# Patient Record
Sex: MALE | Race: BLACK OR AFRICAN AMERICAN | NOT HISPANIC OR LATINO | Employment: OTHER | URBAN - METROPOLITAN AREA
[De-identification: names, ages, dates, MRNs, and addresses within clinical notes are randomized per-mention and may not be internally consistent; named-entity substitution may affect disease eponyms.]

---

## 2018-04-18 ENCOUNTER — OFFICE VISIT (OUTPATIENT)
Dept: SURGERY | Facility: CLINIC | Age: 58
End: 2018-04-18
Payer: MEDICARE

## 2018-04-18 VITALS
HEIGHT: 68 IN | DIASTOLIC BLOOD PRESSURE: 83 MMHG | BODY MASS INDEX: 21.98 KG/M2 | SYSTOLIC BLOOD PRESSURE: 123 MMHG | TEMPERATURE: 98 F | WEIGHT: 145 LBS | RESPIRATION RATE: 18 BRPM | HEART RATE: 80 BPM | OXYGEN SATURATION: 96 %

## 2018-04-18 DIAGNOSIS — D62 ACUTE BLOOD LOSS ANEMIA: ICD-10-CM

## 2018-04-18 DIAGNOSIS — Z12.11 ENCOUNTER FOR SCREENING COLONOSCOPY: ICD-10-CM

## 2018-04-18 DIAGNOSIS — Z12.11 ENCOUNTER FOR SCREENING COLONOSCOPY FOR NON-HIGH-RISK PATIENT: Primary | ICD-10-CM

## 2018-04-18 PROCEDURE — 99205 OFFICE O/P NEW HI 60 MIN: CPT | Mod: S$GLB,,, | Performed by: SURGERY

## 2018-04-18 RX ORDER — SODIUM CHLORIDE 9 MG/ML
INJECTION, SOLUTION INTRAVENOUS CONTINUOUS
Status: CANCELLED | OUTPATIENT
Start: 2018-04-18

## 2018-04-18 RX ORDER — AMLODIPINE BESYLATE 5 MG/1
TABLET ORAL
Status: ON HOLD | COMMUNITY
End: 2019-03-26 | Stop reason: HOSPADM

## 2018-04-18 RX ORDER — POLYETHYLENE GLYCOL 3350, SODIUM SULFATE ANHYDROUS, SODIUM BICARBONATE, SODIUM CHLORIDE, POTASSIUM CHLORIDE 236; 22.74; 6.74; 5.86; 2.97 G/4L; G/4L; G/4L; G/4L; G/4L
4 POWDER, FOR SOLUTION ORAL ONCE
Qty: 4000 ML | Refills: 0 | Status: SHIPPED | OUTPATIENT
Start: 2018-04-18 | End: 2018-04-18

## 2018-04-19 NOTE — H&P
[facility] Surgery H&P Note    Subjective:       Patient ID: Pranay Colindres is a 58 y.o. male.    Chief Complaint: Consult (colonoscopy)    HPI:  Pranay Colindres is a 58 y.o. male with a history of hypertension and PVD on asa presents today as a referral from Dr. Torres for the evaluation of anemia.  Patient has never undergone endoscopy.  Patient has no family history of colon or rectal cancers.  No blood in the stool.  No unexplained weight loss.  No changes in bowel habits.  Patient has no history of reflux disease.  Patient is passed due his need for screening endoscopy.  He presents today for evaluation.    Past Medical History:   Diagnosis Date    Allergy     Arthritis     H/O heart surgery     Hypertension      Past Surgical History:   Procedure Laterality Date    AMPUTATION      right foot 5th digit    HIP SURGERY      x2     No family history on file.  Social History     Social History    Marital status: Single     Spouse name: N/A    Number of children: N/A    Years of education: N/A     Social History Main Topics    Smoking status: Current Some Day Smoker     Types: Cigarettes    Smokeless tobacco: Never Used    Alcohol use Yes      Comment: 1-2    Drug use: No    Sexual activity: Not Asked     Other Topics Concern    None     Social History Narrative    None       Current Outpatient Prescriptions   Medication Sig Dispense Refill    amLODIPine (NORVASC) 5 MG tablet amlodipine 5 mg tablet       No current facility-administered medications for this visit.      Review of patient's allergies indicates:  No Known Allergies    Review of Systems   Constitutional: Negative for appetite change, chills and fever.   HENT: Negative for congestion, dental problem and drooling.    Eyes: Negative for photophobia, discharge and itching.   Respiratory: Negative for apnea and chest tightness.    Cardiovascular: Negative for chest pain, palpitations and leg swelling.   Gastrointestinal: Negative for  "abdominal distention and abdominal pain.   Endocrine: Negative for cold intolerance and heat intolerance.   Genitourinary: Negative for difficulty urinating and dysuria.   Musculoskeletal: Negative for arthralgias and back pain.   Skin: Negative for color change and pallor.   Neurological: Negative for dizziness, facial asymmetry and headaches.   Hematological: Negative for adenopathy. Does not bruise/bleed easily.   Psychiatric/Behavioral: Negative for agitation, behavioral problems and confusion.       Objective:      Vitals:    04/18/18 1348   BP: 123/83   BP Location: Left arm   Patient Position: Sitting   Pulse: 80   Resp: 18   Temp: 97.7 °F (36.5 °C)   SpO2: 96%   Weight: 65.8 kg (145 lb)   Height: 5' 8" (1.727 m)     Physical Exam   Constitutional: He is oriented to person, place, and time. He appears well-developed and well-nourished.   HENT:   Head: Normocephalic and atraumatic.   Eyes: EOM are normal. Pupils are equal, round, and reactive to light.   Neck: Normal range of motion. Neck supple. No thyromegaly present.   Cardiovascular: Normal rate and regular rhythm.    No murmur heard.  Pulmonary/Chest: Effort normal and breath sounds normal. No respiratory distress.   Abdominal: Soft. Bowel sounds are normal. He exhibits no distension. There is no tenderness.   Musculoskeletal: Normal range of motion. He exhibits no edema.   Neurological: He is alert and oriented to person, place, and time. No cranial nerve deficit.   Difficulties with walking, walks with a walker due to his previous oriented review his previous surgeries.  Mentally slightly delayed.   Skin: Skin is warm. Capillary refill takes less than 2 seconds. No rash noted. He is not diaphoretic. No erythema.   Psychiatric: He has a normal mood and affect.       Lab Review: I have reviewed the patient's labs from Dr. Torres office.  Labs showed an anemia of 13 and 39.  He was previously 15 and 43.  Platelets normal at 257.  Sodium 142, potassium 4.7, " chloride 103, bicarbonate 27, creatinine 0.87 glucose 101 LFTs within normal limits iron within normal limits at 84.     Assessment:       1. Encounter for screening colonoscopy for non-high-risk patient    2. Acute blood loss anemia    3. Encounter for screening colonoscopy        Plan:   Encounter for screening colonoscopy for non-high-risk patient  -     Case Request Operating Room: COLONOSCOPY, ESOPHAGOGASTRODUODENOSCOPY (EGD)  -     EKG 12-lead; Future  -     Case Request Operating Room: COLONOSCOPY, ESOPHAGOGASTRODUODENOSCOPY (EGD)  -     polyethylene glycol (GOLYTELY,NULYTELY) 236-22.74-6.74 -5.86 gram suspension; Take 4,000 mLs (4 L total) by mouth once.  Dispense: 4000 mL; Refill: 0    Acute blood loss anemia  -     Case Request Operating Room: COLONOSCOPY, ESOPHAGOGASTRODUODENOSCOPY (EGD)  -     Case Request Operating Room: COLONOSCOPY, ESOPHAGOGASTRODUODENOSCOPY (EGD)    Encounter for screening colonoscopy    Other orders  -     Place in Outpatient; Standing  -     Vital signs; Standing  -     Up ad rui; Standing  -     Insert peripheral IV; Standing  -     Diet NPO; Standing  -     0.9%  NaCl infusion; Inject into the vein continuous.  -     IP VTE LOW RISK PATIENT; Standing  -     Place JULIETTE hose; Standing  -     Place sequential compression device; Standing  -     Pulse Oximetry Q4H; Standing  -     Up ad rui; Standing  -     Diet NPO; Standing  -     0.9%  NaCl infusion; Inject into the vein continuous.  -     Insert peripheral IV; Standing        Medical Decision Making/Counseling:    Risk and benefits of EGD were discussed in clinic in depth.  Risk of EGD were discussed to include bleeding as well as perforation.  Patient understands that if the above procedural risks were to occur, he could need further intervention to include but not exclude a blood transfusion, repeat procedure, admission to the hospital, or even surgery which would likely require transfer to a higher level of care.   Risks and  benefits of Colonoscopy were discussed in depth in clinic as well.  From a procedural standpoint, we discuss the benefits of colonoscopy to be finding colon cancers at early stages, including polyps which can be endoscopically resectable, to finding early stage colon cancers which can be better treated with current medical and surgical therapies in order to give patients a longer survival, if found in these early stages.  From a standpoint of risks, the risk of bleeding and perforation of the colon were discussed.  I personally discussed that if complications of bleeding or perforation were to occur, the patient could need as little as a blood transfusion and as much as possible hospital admission, repeat procedure, or even surgery.  During today's discussion of the procedure of colonoscopy with the patient, I personally monae the patient a picture to assist with counseling.  Total counseling time between 40 minutes face-to-face.

## 2018-04-20 ENCOUNTER — HOSPITAL ENCOUNTER (OUTPATIENT)
Dept: CARDIOLOGY | Facility: HOSPITAL | Age: 58
Discharge: HOME | End: 2018-04-20
Attending: SURGERY
Payer: MEDICARE

## 2018-04-20 DIAGNOSIS — Z12.11 ENCOUNTER FOR SCREENING COLONOSCOPY FOR NON-HIGH-RISK PATIENT: ICD-10-CM

## 2018-04-20 PROCEDURE — 93005 ELECTROCARDIOGRAM TRACING: CPT

## 2018-04-23 ENCOUNTER — SURGERY (OUTPATIENT)
Age: 58
End: 2018-04-23

## 2018-04-23 ENCOUNTER — ANESTHESIA (OUTPATIENT)
Dept: SURGERY | Facility: HOSPITAL | Age: 58
End: 2018-04-23
Payer: MEDICARE

## 2018-04-23 ENCOUNTER — ANESTHESIA EVENT (OUTPATIENT)
Dept: SURGERY | Facility: HOSPITAL | Age: 58
End: 2018-04-23
Payer: MEDICARE

## 2018-04-23 ENCOUNTER — HOSPITAL ENCOUNTER (OUTPATIENT)
Facility: HOSPITAL | Age: 58
Discharge: HOME | End: 2018-04-23
Attending: SURGERY | Admitting: SURGERY
Payer: MEDICARE

## 2018-04-23 DIAGNOSIS — Z12.11 ENCOUNTER FOR SCREENING COLONOSCOPY: Primary | ICD-10-CM

## 2018-04-23 PROCEDURE — 25000003 PHARM REV CODE 250: Performed by: SURGERY

## 2018-04-23 PROCEDURE — 25000003 PHARM REV CODE 250: Performed by: NURSE ANESTHETIST, CERTIFIED REGISTERED

## 2018-04-23 PROCEDURE — 71000033 HC RECOVERY, INTIAL HOUR: Performed by: SURGERY

## 2018-04-23 PROCEDURE — D9220A PRA ANESTHESIA: Mod: ANES,,, | Performed by: ANESTHESIOLOGY

## 2018-04-23 PROCEDURE — 45384 COLONOSCOPY W/LESION REMOVAL: CPT | Performed by: SURGERY

## 2018-04-23 PROCEDURE — D9220A PRA ANESTHESIA: Mod: CRNA,,, | Performed by: NURSE ANESTHETIST, CERTIFIED REGISTERED

## 2018-04-23 PROCEDURE — 43250 EGD CAUTERY TUMOR POLYP: CPT

## 2018-04-23 PROCEDURE — 45384 COLONOSCOPY W/LESION REMOVAL: CPT | Mod: PT,,, | Performed by: SURGERY

## 2018-04-23 PROCEDURE — 37000008 HC ANESTHESIA 1ST 15 MINUTES: Performed by: SURGERY

## 2018-04-23 PROCEDURE — 88305 TISSUE EXAM BY PATHOLOGIST: CPT | Mod: 26,,, | Performed by: PATHOLOGY

## 2018-04-23 PROCEDURE — 88342 IMHCHEM/IMCYTCHM 1ST ANTB: CPT | Mod: 26,,, | Performed by: PATHOLOGY

## 2018-04-23 PROCEDURE — 43239 EGD BIOPSY SINGLE/MULTIPLE: CPT | Performed by: SURGERY

## 2018-04-23 PROCEDURE — 63600175 PHARM REV CODE 636 W HCPCS: Performed by: NURSE ANESTHETIST, CERTIFIED REGISTERED

## 2018-04-23 PROCEDURE — 43250 EGD CAUTERY TUMOR POLYP: CPT | Mod: 51,,, | Performed by: SURGERY

## 2018-04-23 PROCEDURE — 37000009 HC ANESTHESIA EA ADD 15 MINS: Performed by: SURGERY

## 2018-04-23 PROCEDURE — 43239 EGD BIOPSY SINGLE/MULTIPLE: CPT | Mod: 59,,, | Performed by: SURGERY

## 2018-04-23 PROCEDURE — 88305 TISSUE EXAM BY PATHOLOGIST: CPT | Performed by: PATHOLOGY

## 2018-04-23 PROCEDURE — 27201012 HC FORCEPS, HOT/COLD, DISP: Performed by: SURGERY

## 2018-04-23 RX ORDER — MORPHINE SULFATE 4 MG/ML
2 INJECTION, SOLUTION INTRAMUSCULAR; INTRAVENOUS EVERY 5 MIN PRN
Status: CANCELLED | OUTPATIENT
Start: 2018-04-23

## 2018-04-23 RX ORDER — PANTOPRAZOLE SODIUM 40 MG/1
40 TABLET, DELAYED RELEASE ORAL DAILY
Qty: 30 TABLET | Refills: 6 | Status: SHIPPED | OUTPATIENT
Start: 2018-04-23 | End: 2018-11-27 | Stop reason: SDUPTHER

## 2018-04-23 RX ORDER — MIDAZOLAM HYDROCHLORIDE 1 MG/ML
INJECTION, SOLUTION INTRAMUSCULAR; INTRAVENOUS
Status: DISCONTINUED | OUTPATIENT
Start: 2018-04-23 | End: 2018-04-23

## 2018-04-23 RX ORDER — FAMOTIDINE 10 MG/ML
20 INJECTION INTRAVENOUS ONCE
Status: CANCELLED | OUTPATIENT
Start: 2018-04-23 | End: 2018-04-23

## 2018-04-23 RX ORDER — SUCRALFATE 1 G/1
1 TABLET ORAL 4 TIMES DAILY
Qty: 120 TABLET | Refills: 0 | Status: SHIPPED | OUTPATIENT
Start: 2018-04-23 | End: 2019-03-20

## 2018-04-23 RX ORDER — GLYCOPYRROLATE 0.2 MG/ML
INJECTION INTRAMUSCULAR; INTRAVENOUS
Status: DISCONTINUED | OUTPATIENT
Start: 2018-04-23 | End: 2018-04-23

## 2018-04-23 RX ORDER — PROPOFOL 10 MG/ML
VIAL (ML) INTRAVENOUS
Status: DISCONTINUED | OUTPATIENT
Start: 2018-04-23 | End: 2018-04-23

## 2018-04-23 RX ORDER — SODIUM CHLORIDE 9 MG/ML
INJECTION, SOLUTION INTRAVENOUS CONTINUOUS
Status: CANCELLED | OUTPATIENT
Start: 2018-04-23

## 2018-04-23 RX ORDER — ONDANSETRON 2 MG/ML
4 INJECTION INTRAMUSCULAR; INTRAVENOUS DAILY PRN
Status: CANCELLED | OUTPATIENT
Start: 2018-04-23

## 2018-04-23 RX ORDER — DIPHENHYDRAMINE HYDROCHLORIDE 50 MG/ML
12.5 INJECTION INTRAMUSCULAR; INTRAVENOUS
Status: CANCELLED | OUTPATIENT
Start: 2018-04-23

## 2018-04-23 RX ORDER — SODIUM CHLORIDE 9 MG/ML
INJECTION, SOLUTION INTRAVENOUS CONTINUOUS
Status: DISCONTINUED | OUTPATIENT
Start: 2018-04-23 | End: 2018-04-23 | Stop reason: HOSPADM

## 2018-04-23 RX ADMIN — PROPOFOL 30 MG: 10 INJECTION, EMULSION INTRAVENOUS at 09:04

## 2018-04-23 RX ADMIN — PROPOFOL 20 MG: 10 INJECTION, EMULSION INTRAVENOUS at 09:04

## 2018-04-23 RX ADMIN — GLYCOPYRROLATE 0.2 MG: 0.2 INJECTION INTRAMUSCULAR; INTRAVENOUS at 09:04

## 2018-04-23 RX ADMIN — PROPOFOL 30 MG: 10 INJECTION, EMULSION INTRAVENOUS at 10:04

## 2018-04-23 RX ADMIN — SODIUM CHLORIDE: 0.9 INJECTION, SOLUTION INTRAVENOUS at 09:04

## 2018-04-23 RX ADMIN — PROPOFOL 20 MG: 10 INJECTION, EMULSION INTRAVENOUS at 10:04

## 2018-04-23 RX ADMIN — MIDAZOLAM HYDROCHLORIDE 2 MG: 1 INJECTION, SOLUTION INTRAMUSCULAR; INTRAVENOUS at 09:04

## 2018-04-23 RX ADMIN — PROPOFOL 50 MG: 10 INJECTION, EMULSION INTRAVENOUS at 09:04

## 2018-04-23 RX ADMIN — PROPOFOL 40 MG: 10 INJECTION, EMULSION INTRAVENOUS at 09:04

## 2018-04-23 NOTE — PROVATION PATIENT INSTRUCTIONS
Discharge Summary/Instructions after an Endoscopic Procedure  Patient Name: Pranay Colindres  Patient MRN: [id number]  Patient YOB: 1960 Monday, April 23, 2018  Jeramy Castelan MD  RESTRICTIONS:  During your procedure today, you received medications for sedation.  These   medications may affect your judgment, balance and coordination.  Therefore,   for 24 hours, you have the following restrictions:   - DO NOT drive a car, operate machinery, make legal/financial decisions,   sign important papers or drink alcohol.    ACTIVITY:  The following day: return to full activity including work, except no heavy   lifting, straining or running for 3 days if polyps were removed.  DIET:  Eat and drink normally unless instructed otherwise.     TREATMENT FOR COMMON SIDE EFFECTS:  - Mild abdominal pain, nausea, belching, bloating or excessive gas:  rest,   eat lightly and use a heating pad.  - Sore Throat: treat with throat lozenges and/or gargle with warm salt   water.  - Because air was used during the procedure, expelling large amounts of air   from your rectum or belching is normal.  - If a bowel prep was taken, you may not have a bowel movement for 1-3 days.    This is normal.  SYMPTOMS TO WATCH FOR AND REPORT TO YOUR PHYSICIAN:  1. Abdominal pain or bloating, other than gas cramps.  2. Chest pain.  3. Back pain.  4. Signs of infection such as: chills or fever occurring within 24 hours   after the procedure.  5. Rectal bleeding, which would show as bright red, maroon, or black stools.   (A tablespoon of blood from the rectum is not serious, especially if   hemorrhoids are present.)  6. Vomiting.  7. Weakness or dizziness.  GO DIRECTLY TO THE NEAREST EMERGENCY ROOM IF YOU HAVE ANY OF THE FOLLOWING:      Difficulty breathing              Chills and/or fever over 101 F   Persistent vomiting and/or vomiting blood   Severe abdominal pain   Severe chest pain   Black, tarry stools   Bleeding- more than one  tablespoon   Any other symptom or condition that you feel may need urgent attention  Your doctor recommends these additional instructions:  If any biopsies were taken, your doctors clinic will contact you in 1 to 2   weeks with any results.  - Await pathology results.   - Repeat upper endoscopy in 3 years for surveillance.   - Return to my office in 2 weeks.   - Discharge patient to home (ambulatory).   - Resume regular diet daily.  For questions, problems or results please call your physician - Jeramy Castelan MD at Work:  [phone].  [facility] EMERGENCY ROOM PHONE NUMBER: [phone]  IF A COMPLICATION OR EMERGENCY SITUATION ARISES AND YOU ARE UNABLE TO REACH   YOUR PHYSICIAN - GO DIRECTLY TO THE EMERGENCY ROOM.  MD Jeramy Chaidez MD  4/23/2018 10:17:24 AM  This report has been verified and signed electronically.

## 2018-04-23 NOTE — INTERVAL H&P NOTE
The patient has been examined and the H&P has been reviewed:    I concur with the findings and no changes have occurred since H&P was written.    Surgery risks, benefits and alternative options discussed and understood by patient/family.          There are no hospital problems to display for this patient.

## 2018-04-23 NOTE — H&P (VIEW-ONLY)
[facility] Surgery H&P Note    Subjective:       Patient ID: Pranay Colindres is a 58 y.o. male.    Chief Complaint: Consult (colonoscopy)    HPI:  Pranay Colindres is a 58 y.o. male with a history of hypertension and PVD on asa presents today as a referral from Dr. Torres for the evaluation of anemia.  Patient has never undergone endoscopy.  Patient has no family history of colon or rectal cancers.  No blood in the stool.  No unexplained weight loss.  No changes in bowel habits.  Patient has no history of reflux disease.  Patient is passed due his need for screening endoscopy.  He presents today for evaluation.    Past Medical History:   Diagnosis Date    Allergy     Arthritis     H/O heart surgery     Hypertension      Past Surgical History:   Procedure Laterality Date    AMPUTATION      right foot 5th digit    HIP SURGERY      x2     No family history on file.  Social History     Social History    Marital status: Single     Spouse name: N/A    Number of children: N/A    Years of education: N/A     Social History Main Topics    Smoking status: Current Some Day Smoker     Types: Cigarettes    Smokeless tobacco: Never Used    Alcohol use Yes      Comment: 1-2    Drug use: No    Sexual activity: Not Asked     Other Topics Concern    None     Social History Narrative    None       Current Outpatient Prescriptions   Medication Sig Dispense Refill    amLODIPine (NORVASC) 5 MG tablet amlodipine 5 mg tablet       No current facility-administered medications for this visit.      Review of patient's allergies indicates:  No Known Allergies    Review of Systems   Constitutional: Negative for appetite change, chills and fever.   HENT: Negative for congestion, dental problem and drooling.    Eyes: Negative for photophobia, discharge and itching.   Respiratory: Negative for apnea and chest tightness.    Cardiovascular: Negative for chest pain, palpitations and leg swelling.   Gastrointestinal: Negative for  "abdominal distention and abdominal pain.   Endocrine: Negative for cold intolerance and heat intolerance.   Genitourinary: Negative for difficulty urinating and dysuria.   Musculoskeletal: Negative for arthralgias and back pain.   Skin: Negative for color change and pallor.   Neurological: Negative for dizziness, facial asymmetry and headaches.   Hematological: Negative for adenopathy. Does not bruise/bleed easily.   Psychiatric/Behavioral: Negative for agitation, behavioral problems and confusion.       Objective:      Vitals:    04/18/18 1348   BP: 123/83   BP Location: Left arm   Patient Position: Sitting   Pulse: 80   Resp: 18   Temp: 97.7 °F (36.5 °C)   SpO2: 96%   Weight: 65.8 kg (145 lb)   Height: 5' 8" (1.727 m)     Physical Exam   Constitutional: He is oriented to person, place, and time. He appears well-developed and well-nourished.   HENT:   Head: Normocephalic and atraumatic.   Eyes: EOM are normal. Pupils are equal, round, and reactive to light.   Neck: Normal range of motion. Neck supple. No thyromegaly present.   Cardiovascular: Normal rate and regular rhythm.    No murmur heard.  Pulmonary/Chest: Effort normal and breath sounds normal. No respiratory distress.   Abdominal: Soft. Bowel sounds are normal. He exhibits no distension. There is no tenderness.   Musculoskeletal: Normal range of motion. He exhibits no edema.   Neurological: He is alert and oriented to person, place, and time. No cranial nerve deficit.   Difficulties with walking, walks with a walker due to his previous oriented review his previous surgeries.  Mentally slightly delayed.   Skin: Skin is warm. Capillary refill takes less than 2 seconds. No rash noted. He is not diaphoretic. No erythema.   Psychiatric: He has a normal mood and affect.       Lab Review: I have reviewed the patient's labs from Dr. Torres office.  Labs showed an anemia of 13 and 39.  He was previously 15 and 43.  Platelets normal at 257.  Sodium 142, potassium 4.7, " chloride 103, bicarbonate 27, creatinine 0.87 glucose 101 LFTs within normal limits iron within normal limits at 84.     Assessment:       1. Encounter for screening colonoscopy for non-high-risk patient    2. Acute blood loss anemia    3. Encounter for screening colonoscopy        Plan:   Encounter for screening colonoscopy for non-high-risk patient  -     Case Request Operating Room: COLONOSCOPY, ESOPHAGOGASTRODUODENOSCOPY (EGD)  -     EKG 12-lead; Future  -     Case Request Operating Room: COLONOSCOPY, ESOPHAGOGASTRODUODENOSCOPY (EGD)  -     polyethylene glycol (GOLYTELY,NULYTELY) 236-22.74-6.74 -5.86 gram suspension; Take 4,000 mLs (4 L total) by mouth once.  Dispense: 4000 mL; Refill: 0    Acute blood loss anemia  -     Case Request Operating Room: COLONOSCOPY, ESOPHAGOGASTRODUODENOSCOPY (EGD)  -     Case Request Operating Room: COLONOSCOPY, ESOPHAGOGASTRODUODENOSCOPY (EGD)    Encounter for screening colonoscopy    Other orders  -     Place in Outpatient; Standing  -     Vital signs; Standing  -     Up ad rui; Standing  -     Insert peripheral IV; Standing  -     Diet NPO; Standing  -     0.9%  NaCl infusion; Inject into the vein continuous.  -     IP VTE LOW RISK PATIENT; Standing  -     Place JULIETTE hose; Standing  -     Place sequential compression device; Standing  -     Pulse Oximetry Q4H; Standing  -     Up ad rui; Standing  -     Diet NPO; Standing  -     0.9%  NaCl infusion; Inject into the vein continuous.  -     Insert peripheral IV; Standing        Medical Decision Making/Counseling:    Risk and benefits of EGD were discussed in clinic in depth.  Risk of EGD were discussed to include bleeding as well as perforation.  Patient understands that if the above procedural risks were to occur, he could need further intervention to include but not exclude a blood transfusion, repeat procedure, admission to the hospital, or even surgery which would likely require transfer to a higher level of care.   Risks and  benefits of Colonoscopy were discussed in depth in clinic as well.  From a procedural standpoint, we discuss the benefits of colonoscopy to be finding colon cancers at early stages, including polyps which can be endoscopically resectable, to finding early stage colon cancers which can be better treated with current medical and surgical therapies in order to give patients a longer survival, if found in these early stages.  From a standpoint of risks, the risk of bleeding and perforation of the colon were discussed.  I personally discussed that if complications of bleeding or perforation were to occur, the patient could need as little as a blood transfusion and as much as possible hospital admission, repeat procedure, or even surgery.  During today's discussion of the procedure of colonoscopy with the patient, I personally monae the patient a picture to assist with counseling.  Total counseling time between 40 minutes face-to-face.

## 2018-04-23 NOTE — PROVATION PATIENT INSTRUCTIONS
Discharge Summary/Instructions after an Endoscopic Procedure  Patient Name: Pranay Colindres  Patient MRN: [id number]  Patient YOB: 1960 Monday, April 23, 2018  Jeramy Castelan MD  RESTRICTIONS:  During your procedure today, you received medications for sedation.  These   medications may affect your judgment, balance and coordination.  Therefore,   for 24 hours, you have the following restrictions:   - DO NOT drive a car, operate machinery, make legal/financial decisions,   sign important papers or drink alcohol.    ACTIVITY:  The following day: return to full activity including work, except no heavy   lifting, straining or running for 3 days if polyps were removed.  DIET:  Eat and drink normally unless instructed otherwise.     TREATMENT FOR COMMON SIDE EFFECTS:  - Mild abdominal pain, nausea, belching, bloating or excessive gas:  rest,   eat lightly and use a heating pad.  - Sore Throat: treat with throat lozenges and/or gargle with warm salt   water.  - Because air was used during the procedure, expelling large amounts of air   from your rectum or belching is normal.  - If a bowel prep was taken, you may not have a bowel movement for 1-3 days.    This is normal.  SYMPTOMS TO WATCH FOR AND REPORT TO YOUR PHYSICIAN:  1. Abdominal pain or bloating, other than gas cramps.  2. Chest pain.  3. Back pain.  4. Signs of infection such as: chills or fever occurring within 24 hours   after the procedure.  5. Rectal bleeding, which would show as bright red, maroon, or black stools.   (A tablespoon of blood from the rectum is not serious, especially if   hemorrhoids are present.)  6. Vomiting.  7. Weakness or dizziness.  GO DIRECTLY TO THE NEAREST EMERGENCY ROOM IF YOU HAVE ANY OF THE FOLLOWING:      Difficulty breathing              Chills and/or fever over 101 F   Persistent vomiting and/or vomiting blood   Severe abdominal pain   Severe chest pain   Black, tarry stools   Bleeding- more than one  tablespoon   Any other symptom or condition that you feel may need urgent attention  Your doctor recommends these additional instructions:  If any biopsies were taken, your doctors clinic will contact you in 1 to 2   weeks with any results.  - Repeat colonoscopy in 5 years for surveillance.   - Discharge patient to home (ambulatory).   - Resume regular diet daily.   - Await pathology results.   - Return to my office in 2 weeks.  For questions, problems or results please call your physician - Jeramy Castelan MD at Work:  [phone].  [facility] EMERGENCY ROOM PHONE NUMBER: [phone]  IF A COMPLICATION OR EMERGENCY SITUATION ARISES AND YOU ARE UNABLE TO REACH   YOUR PHYSICIAN - GO DIRECTLY TO THE EMERGENCY ROOM.  MD Jeramy Chaidez MD  4/23/2018 10:21:22 AM  This report has been verified and signed electronically.

## 2018-04-23 NOTE — ANESTHESIA POSTPROCEDURE EVALUATION
Anesthesia Post Evaluation    Patient: Pranay Colindres    Procedure(s) Performed: Procedure(s) (LRB):  COLONOSCOPY (N/A)  ESOPHAGOGASTRODUODENOSCOPY (EGD) (N/A)    Final Anesthesia Type: MAC  Patient location during evaluation: PACU  Patient participation: Yes- Able to Participate  Level of consciousness: awake and alert  Pain management: adequate  Airway patency: patent  PONV status at discharge: No PONV  Anesthetic complications: no      Cardiovascular status: blood pressure returned to baseline  Respiratory status: unassisted  Hydration status: euvolemic  Follow-up not needed.        Visit Vitals  BP (!) 138/96   Pulse 65   Temp 36.7 °C (98 °F) (Oral)   Resp 17   SpO2 96%       Pain/Yue Score: Pain Assessment Performed: Yes (4/23/2018  7:50 AM)  Presence of Pain: non-verbal indicators absent (4/23/2018 10:16 AM)  Yue Score: 9 (4/23/2018 10:30 AM)  Modified Yue Score: 19 (4/23/2018 10:45 AM)

## 2018-04-23 NOTE — PLAN OF CARE
Leaving floor per w/c with RN and brother. Awake and alert. Resp even and unlabored room air. No distress noted. Le PO fluids. Denies c/o pain or nausea. Reports voiding without difficulty. All belongings returned to pt. rm

## 2018-04-23 NOTE — TRANSFER OF CARE
Anesthesia Transfer of Care Note    Patient: Pranay Colindres    Procedure(s) Performed: Procedure(s) (LRB):  COLONOSCOPY (N/A)  ESOPHAGOGASTRODUODENOSCOPY (EGD) (N/A)    Patient location: PACU    Anesthesia Type: MAC    Transport from OR: Transported from OR on room air with adequate spontaneous ventilation    Post pain: adequate analgesia    Post assessment: no apparent anesthetic complications and tolerated procedure well    Post vital signs: stable    Level of consciousness: awake, alert and oriented    Nausea/Vomiting: no nausea/vomiting    Complications: none    Transfer of care protocol was followedComments: Remained with pt till RN available to receive report      Last vitals: There were no vitals taken for this visit.

## 2018-04-23 NOTE — ANESTHESIA PREPROCEDURE EVALUATION
04/23/2018  Pranay Colindres is a 58 y.o., male.    Anesthesia Evaluation    I have reviewed the Patient Summary Reports.    I have reviewed the Nursing Notes.   I have reviewed the Medications.     Review of Systems  Anesthesia Hx:  No problems with previous Anesthesia    Social:  Smoker    Hematology/Oncology:         -- Anemia:   Cardiovascular:  Peripheral Arterial Disease  Hypertension    Musculoskeletal:  Musculoskeletal General/Symptoms: Functional capacity is ambulatory with walker.        Physical Exam  General:  Well nourished    Airway/Jaw/Neck:  Airway Findings: Mouth Opening: Normal Tongue: Normal  General Airway Assessment: Adult  Mallampati: II  TM Distance: Normal, at least 6 cm  Jaw/Neck Findings:  Neck ROM: Normal ROM       Chest/Lungs:  Chest/Lungs Findings: Clear to auscultation     Heart/Vascular:  Heart Findings: Rate: Normal  Rhythm: Regular Rhythm        Mental Status:  Mental Status Findings:  Cooperative, Alert and Oriented         Anesthesia Plan  Type of Anesthesia, risks & benefits discussed:  Anesthesia Type:  MAC  Patient's Preference:   Intra-op Monitoring Plan: standard ASA monitors  Intra-op Monitoring Plan Comments:   Post Op Pain Control Plan: IV/PO Opioids PRN  Post Op Pain Control Plan Comments:   Induction:   IV  Beta Blocker:  Patient is not currently on a Beta-Blocker (No further documentation required).       Informed Consent: Patient understands risks and agrees with Anesthesia plan.  Questions answered. Anesthesia consent signed with patient.  ASA Score: 3     Day of Surgery Review of History & Physical: I have interviewed and examined the patient. I have reviewed the patient's H&P dated:            Ready For Surgery From Anesthesia Perspective.

## 2018-04-23 NOTE — DISCHARGE SUMMARY
Discharge Note        SUMMARY     Admit Date: 4/23/2018    Attending Physician: Jeramy Castelan MD     Discharge Physician: Jeramy Castelan MD    Discharge Date: 4/23/2018 10:23 AM      Hospital Course: Patient tolerated procedure well.     Disposition: Home or Self Care    Patient Instructions:   Current Discharge Medication List      START taking these medications    Details   pantoprazole (PROTONIX) 40 MG tablet Take 1 tablet (40 mg total) by mouth once daily.  Qty: 30 tablet, Refills: 6      sucralfate (CARAFATE) 1 gram tablet Take 1 tablet (1 g total) by mouth 4 (four) times daily.  Qty: 120 tablet, Refills: 0         CONTINUE these medications which have NOT CHANGED    Details   amLODIPine (NORVASC) 5 MG tablet amlodipine 5 mg tablet             Discharge Procedure Orders (must include Diet, Follow-up, Activity):    Discharge Procedure Orders (must include Diet, Follow-up, Activity)  Diet general     Activity as tolerated     Call MD for:  temperature >100.4     Call MD for:  persistent nausea and vomiting     Call MD for:  severe uncontrolled pain     Call MD for:  difficulty breathing, headache or visual disturbances     Call MD for:  redness, tenderness, or signs of infection (pain, swelling, redness, odor or green/yellow discharge around incision site)     Call MD for:  persistent dizziness or light-headedness          Follow Up:  Follow up as scheduled.  Resume routine diet.  Activity as tolerated.    No driving day of procedure.

## 2018-04-27 VITALS
OXYGEN SATURATION: 96 % | TEMPERATURE: 98 F | RESPIRATION RATE: 17 BRPM | DIASTOLIC BLOOD PRESSURE: 96 MMHG | HEART RATE: 65 BPM | SYSTOLIC BLOOD PRESSURE: 138 MMHG

## 2018-05-11 ENCOUNTER — OFFICE VISIT (OUTPATIENT)
Dept: SURGERY | Facility: CLINIC | Age: 58
End: 2018-05-11
Payer: MEDICARE

## 2018-05-11 VITALS
OXYGEN SATURATION: 96 % | SYSTOLIC BLOOD PRESSURE: 114 MMHG | HEART RATE: 82 BPM | RESPIRATION RATE: 18 BRPM | BODY MASS INDEX: 21.98 KG/M2 | DIASTOLIC BLOOD PRESSURE: 79 MMHG | HEIGHT: 68 IN | TEMPERATURE: 97 F | WEIGHT: 145 LBS

## 2018-05-11 DIAGNOSIS — K29.30 CHRONIC SUPERFICIAL GASTRITIS WITHOUT BLEEDING: Primary | ICD-10-CM

## 2018-05-11 DIAGNOSIS — K63.5 HYPERPLASTIC COLONIC POLYP, UNSPECIFIED PART OF COLON: ICD-10-CM

## 2018-05-11 PROCEDURE — 99212 OFFICE O/P EST SF 10 MIN: CPT | Mod: S$GLB,,, | Performed by: SURGERY

## 2018-05-11 RX ORDER — FOLIC ACID 1 MG/1
1 TABLET ORAL 2 TIMES DAILY
COMMUNITY
End: 2019-03-20

## 2018-05-11 RX ORDER — METOPROLOL SUCCINATE 50 MG/1
50 TABLET, EXTENDED RELEASE ORAL DAILY
COMMUNITY

## 2018-05-13 NOTE — PROGRESS NOTES
"[facility] Surgery  Follow-up    Subjective:       Patient ID: Pranay Colindres is a 58 y.o. male.    Chief Complaint: Follow-up (EGD/Colonoscopy 4-23-18)      HPI:  Pranay Colindres is a 58 y.o. male who presents today for follow-up of EGD and colonoscopy.  Patient on EGD was found to have active duodenitis with acute and chronic inflammation.  Within the stomach the patient was found to have mild chronic gastritis.  Both specimens were evaluated for Helicobacter pylori species which was negative. Patient's cardiac polyp was negative.  Patient's colonoscopy showed 1 rectal polyp which was a hyperplastic polyp.  No issues since the scopes.    Review of Systems   Constitutional: Negative for appetite change, chills and fever.   HENT: Negative for congestion, dental problem and drooling.    Eyes: Negative for photophobia, discharge and itching.   Respiratory: Negative for apnea and chest tightness.    Cardiovascular: Negative for chest pain, palpitations and leg swelling.   Gastrointestinal: Negative for abdominal distention and abdominal pain.   Endocrine: Negative for cold intolerance and heat intolerance.   Genitourinary: Negative for difficulty urinating and dysuria.   Musculoskeletal: Negative for arthralgias and back pain.   Skin: Negative for color change and pallor.   Neurological: Negative for dizziness, facial asymmetry and headaches.   Hematological: Negative for adenopathy. Does not bruise/bleed easily.   Psychiatric/Behavioral: Negative for agitation, behavioral problems and confusion.       Objective:      Vitals:    05/11/18 1008   BP: 114/79   BP Location: Left arm   Patient Position: Sitting   Pulse: 82   Resp: 18   Temp: 97 °F (36.1 °C)   TempSrc: Oral   SpO2: 96%   Weight: 65.8 kg (145 lb)   Height: 5' 8" (1.727 m)     Physical Exam   Constitutional: He is oriented to person, place, and time. He appears well-developed and well-nourished.   HENT:   Head: Normocephalic and atraumatic.   Eyes: " EOM are normal. Pupils are equal, round, and reactive to light.   Neck: Normal range of motion. Neck supple. No thyromegaly present.   Cardiovascular: Normal rate and regular rhythm.    No murmur heard.  Pulmonary/Chest: Effort normal and breath sounds normal. No respiratory distress.   Abdominal: Soft. Bowel sounds are normal. He exhibits no distension. There is no tenderness.   Musculoskeletal: Normal range of motion. He exhibits no edema.   Neurological: He is alert and oriented to person, place, and time. No cranial nerve deficit.   Skin: Skin is warm. Capillary refill takes less than 2 seconds. No rash noted. He is not diaphoretic. No erythema.   Psychiatric: He has a normal mood and affect.     Diagnostics Review: Pathology reviewed with the patient today.  Active duodenitis, chronic gastritis mild, hyperplastic rectal polyp.     Assessment:       1. Chronic superficial gastritis without bleeding    2. Hyperplastic colonic polyp, unspecified part of colon        Plan:   Chronic superficial gastritis without bleeding    Hyperplastic colonic polyp, unspecified part of colon        Medical Decision Making/Counseling:  Recommendations at this time are for repeat colonoscopy in 5 years.  Recommendations for repeat EGD are pending patient's symptomatology.  Currently I recommend continued Carafate therapy for 1 month after the scope. I recommend continue PPI therapy for at least 6 months.  Patient may follow up in 5 years for repeat colonoscopy.

## 2018-12-03 RX ORDER — PANTOPRAZOLE SODIUM 40 MG/1
TABLET, DELAYED RELEASE ORAL
Qty: 30 TABLET | Refills: 6 | Status: SHIPPED | OUTPATIENT
Start: 2018-12-03 | End: 2019-03-20

## 2019-03-06 ENCOUNTER — OFFICE VISIT (OUTPATIENT)
Dept: PODIATRY | Facility: CLINIC | Age: 59
End: 2019-03-06
Payer: MEDICARE

## 2019-03-06 ENCOUNTER — HOSPITAL ENCOUNTER (OUTPATIENT)
Dept: RADIOLOGY | Facility: HOSPITAL | Age: 59
Discharge: HOME | End: 2019-03-06
Attending: PODIATRIST
Payer: MEDICARE

## 2019-03-06 VITALS
TEMPERATURE: 98 F | HEART RATE: 78 BPM | WEIGHT: 145 LBS | SYSTOLIC BLOOD PRESSURE: 156 MMHG | HEIGHT: 68 IN | DIASTOLIC BLOOD PRESSURE: 98 MMHG | BODY MASS INDEX: 21.98 KG/M2

## 2019-03-06 DIAGNOSIS — M79.671 FOOT PAIN, RIGHT: ICD-10-CM

## 2019-03-06 DIAGNOSIS — M79.2 NEURITIS: ICD-10-CM

## 2019-03-06 DIAGNOSIS — M79.671 FOOT PAIN, RIGHT: Primary | ICD-10-CM

## 2019-03-06 PROCEDURE — 73630 X-RAY EXAM OF FOOT: CPT | Mod: 26,RT,, | Performed by: RADIOLOGY

## 2019-03-06 PROCEDURE — 3008F PR BODY MASS INDEX (BMI) DOCUMENTED: ICD-10-PCS | Mod: CPTII,S$GLB,, | Performed by: PODIATRIST

## 2019-03-06 PROCEDURE — 73630 X-RAY EXAM OF FOOT: CPT | Mod: TC,FY,RT

## 2019-03-06 PROCEDURE — 99214 OFFICE O/P EST MOD 30 MIN: CPT | Mod: S$GLB,,, | Performed by: PODIATRIST

## 2019-03-06 PROCEDURE — 99999 PR PBB SHADOW E&M-EST. PATIENT-LVL III: ICD-10-PCS | Mod: PBBFAC,,, | Performed by: PODIATRIST

## 2019-03-06 PROCEDURE — 99214 PR OFFICE/OUTPT VISIT, EST, LEVL IV, 30-39 MIN: ICD-10-PCS | Mod: S$GLB,,, | Performed by: PODIATRIST

## 2019-03-06 PROCEDURE — 99999 PR PBB SHADOW E&M-EST. PATIENT-LVL III: CPT | Mod: PBBFAC,,, | Performed by: PODIATRIST

## 2019-03-06 PROCEDURE — 3008F BODY MASS INDEX DOCD: CPT | Mod: CPTII,S$GLB,, | Performed by: PODIATRIST

## 2019-03-06 PROCEDURE — 73630 XR FOOT COMPLETE 3 VIEW RIGHT: ICD-10-PCS | Mod: 26,RT,, | Performed by: RADIOLOGY

## 2019-03-06 RX ORDER — METOPROLOL TARTRATE 50 MG/1
TABLET ORAL
Status: ON HOLD | COMMUNITY
Start: 2019-02-26 | End: 2019-03-26 | Stop reason: HOSPADM

## 2019-03-06 RX ORDER — LATANOPROST 50 UG/ML
SOLUTION/ DROPS OPHTHALMIC
COMMUNITY
Start: 2019-02-26

## 2019-03-06 RX ORDER — GABAPENTIN 300 MG/1
300 CAPSULE ORAL NIGHTLY
Qty: 30 CAPSULE | Refills: 6 | Status: SHIPPED | OUTPATIENT
Start: 2019-03-06 | End: 2019-04-07

## 2019-03-10 NOTE — PROGRESS NOTES
Subjective:       Patient ID: Pranay Colindres is a 59 y.o. male.    Chief Complaint: Nail Problem; Foot Problem; Foot Pain; and Heel Pain   Patient presents today with a complaint of pain in his arch on the top of his right foot he states he has been experiencing significantly increased discomfort at night when he experiences a burning type sensation.  Patient also complains of ingrown toenails on both big toes.  HPI  Review of Systems   Musculoskeletal: Positive for arthralgias, gait problem and joint swelling.   Neurological: Positive for weakness and numbness.   All other systems reviewed and are negative.      Objective:      Physical Exam   Constitutional: He appears well-developed and well-nourished.   Cardiovascular:   Pulses:       Dorsalis pedis pulses are 1+ on the right side, and 1+ on the left side.        Posterior tibial pulses are 1+ on the right side, and 1+ on the left side.   Pulmonary/Chest: Effort normal.   Musculoskeletal: He exhibits edema, tenderness and deformity.        Right foot: There is decreased range of motion and deformity.        Left foot: There is decreased range of motion and deformity.   Feet:   Right Foot:   Protective Sensation: 4 sites tested. 2 sites sensed.   Skin Integrity: Positive for erythema, callus and dry skin.   Left Foot:   Protective Sensation: 4 sites tested. 2 sites sensed.   Skin Integrity: Positive for erythema, callus and dry skin.   Neurological: He displays abnormal reflex.   Skin: Skin is warm. Capillary refill takes more than 3 seconds.   Psychiatric: He has a normal mood and affect. His behavior is normal. Judgment and thought content normal.   Nursing note and vitals reviewed.    X-Ray Foot Complete Right   Order: [id number]   Status:  Final result   Visible to patient:  No (Not Released) Next appt:  None Dx:  Foot pain, right   Details     Reading Physician Reading Date Result Priority   Rachid Smith MD 3/6/2019       Narrative      EXAMINATION:  XR FOOT COMPLETE 3 VIEW RIGHT    CLINICAL HISTORY:  . Pain in right foot    TECHNIQUE:  AP, lateral, and oblique views of the right foot were performed.    COMPARISON:  Plain films right foot 01/25/2017.    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    Mild hallux valgus deformity with mild degenerative osteoarthrosis of the 1st MTP joint.  Remaining joint spaces are preserved.    Tarsal bones are intact.  Normal tarsometatarsal alignment.  Os peroneum present.    Mild bone demineralization.      Impression       1. 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  2. Mild hallux valgus deformity with mild degenerative osteoarthrosis.  3. Mild bone demineralization.      Electronically signed by: Rachid Smith  Date: 03/06/2019  Time: 10:43             Assessment:       1. Foot pain, right    2. Neuritis        Plan:       Following evaluation patient presents today with multiple complaints his primary complaint is pain on the top of his foot and the arch of his right foot he states this is especially bad at night when he is trying to go to sleep it either keeps him from falling asleep her wakes him up after he has fallen asleep with a burning shooting pain in the right foot. I did discuss and evaluate with the patient that he has findings consistent with neuropathy in the right foot this is nerve related pain especially because it is bothering him at night I have recommended starting the patient on gabapentin 300 mg at bedtime I want see how the patient responds to this I have advised him if he is not doing substantially better over the next several weeks we may have to adjust the dose or seek different treatment options patient was in understanding and agreement with this.  Patient was additionally seen for ingrowing toenails on both big toes early signs of infection was noted with positive erythema positive edema medial lateral border bilateral hallux I was able to debride and remove remove the  ingrowing toenail on both big toes which gave the patient considerable relief antibiotic ointment dry dressing was applied total face-to-face time equaled 30 min I did review patient's x-rays of his right foot because he has had a history of previous amputation on the right foot I wanted to take an x-ray of the area there were no significant abnormalities noted other than findings consistent with degenerative arthritis which is consistent with the patient's age.

## 2019-03-13 ENCOUNTER — HOSPITAL ENCOUNTER (EMERGENCY)
Facility: HOSPITAL | Age: 59
Discharge: HOME | End: 2019-03-13
Payer: MEDICARE

## 2019-03-13 ENCOUNTER — TELEPHONE (OUTPATIENT)
Dept: PODIATRY | Facility: CLINIC | Age: 59
End: 2019-03-13

## 2019-03-13 VITALS
OXYGEN SATURATION: 99 % | BODY MASS INDEX: 21.98 KG/M2 | WEIGHT: 145 LBS | SYSTOLIC BLOOD PRESSURE: 144 MMHG | HEART RATE: 90 BPM | DIASTOLIC BLOOD PRESSURE: 104 MMHG | RESPIRATION RATE: 16 BRPM | HEIGHT: 68 IN | TEMPERATURE: 98 F

## 2019-03-13 DIAGNOSIS — M79.671 RIGHT FOOT PAIN: Primary | ICD-10-CM

## 2019-03-13 DIAGNOSIS — M79.2 NEURITIS: ICD-10-CM

## 2019-03-13 PROCEDURE — 25000003 PHARM REV CODE 250: Performed by: NURSE PRACTITIONER

## 2019-03-13 PROCEDURE — 99283 EMERGENCY DEPT VISIT LOW MDM: CPT

## 2019-03-13 RX ORDER — TRAMADOL HYDROCHLORIDE 50 MG/1
50 TABLET ORAL
Status: COMPLETED | OUTPATIENT
Start: 2019-03-13 | End: 2019-03-13

## 2019-03-13 RX ORDER — TRAMADOL HYDROCHLORIDE 50 MG/1
50 TABLET ORAL EVERY 6 HOURS PRN
Qty: 3 TABLET | Refills: 0 | Status: SHIPPED | OUTPATIENT
Start: 2019-03-13 | End: 2019-05-16

## 2019-03-13 RX ADMIN — TRAMADOL HYDROCHLORIDE 50 MG: 50 TABLET, COATED ORAL at 01:03

## 2019-03-13 NOTE — TELEPHONE ENCOUNTER
----- Message from Mary Abrams sent at 3/13/2019 12:55 PM CDT -----  Contact: mary kay mariana (niravi)  Type:  Same Day Appointment Request    Caller is requesting a same day appointment.  Caller declined first available appointment listed below.      Name of Caller:  mary kay peña (vic)  When is the first available appointment?  03/20/2019  Symptoms:  Unbearable foot pain. Kezia huynh/c03/13/2019  Best Call Back Number:  [phone]  Additional Information:   States that patient has to be seen today. Please give call back

## 2019-03-13 NOTE — ED PROVIDER NOTES
Encounter Date: 3/13/2019       History   No chief complaint on file.    Pranay Colindres is a 59y.o male with PMHx including arthritis, hypertension and cerebral palsy. He presents to ED with pain to right foot x 2 weeks.  He denies pain or trauma  He was seen by Dr. Lee last week. Xrays were normal. Patient started on Neurontin for nerve pain and instructed to return in 1 week to discuss efficacy of treatment.  Patient reports burning sensation to top of right foot and heel despite Neurontin. He reports pain is worse at night  No redness, swelling or warmth          Review of patient's allergies indicates:  No Known Allergies  Past Medical History:   Diagnosis Date    Allergy     Arthritis     H/O heart surgery     Hypertension      Past Surgical History:   Procedure Laterality Date    AMPUTATION      right foot 5th digit    COLONOSCOPY  04/23/2018    COLONOSCOPY N/A 4/23/2018    [facility]    ESOPHAGOGASTRODUODENOSCOPY  04/23/2018    ESOPHAGOGASTRODUODENOSCOPY (EGD) N/A 4/23/2018    [facility] at [facility] END[facility]    HIP SURGERY Bilateral     x2     No family history on file.  Social History     Tobacco Use    Smoking status: Current Some Day Smoker     Types: Cigarettes    Smokeless tobacco: Never Used   Substance Use Topics    Alcohol use: Yes     Comment: 1-2    Drug use: No     Review of Systems   Constitutional: Negative for fever.   HENT: Negative for sore throat.    Respiratory: Negative for shortness of breath.    Cardiovascular: Negative for chest pain.   Gastrointestinal: Negative for nausea.   Genitourinary: Negative for dysuria.   Musculoskeletal: Positive for arthralgias (right foot pain). Negative for back pain.   Skin: Negative for rash.   Neurological: Negative for weakness.   Hematological: Does not bruise/bleed easily.   All other systems reviewed and are negative.      Physical Exam     Initial Vitals   BP Pulse Resp  Temp SpO2   -- -- -- -- --      MAP       --         Physical Exam    Nursing note and vitals reviewed.  Constitutional: He appears well-developed and well-nourished.   HENT:   Head: Normocephalic.   Eyes: Pupils are equal, round, and reactive to light.   Neck: Normal range of motion.   Cardiovascular: Normal rate and regular rhythm.   Pulmonary/Chest: Breath sounds normal.   Musculoskeletal: He exhibits tenderness.        Right ankle: He exhibits normal range of motion, no swelling, no ecchymosis and normal pulse. Tenderness. Achilles tendon exhibits no pain, no defect and normal Burns's test results.        Feet:    Neurological: He is alert and oriented to person, place, and time.   Skin: Skin is warm and dry.   Psychiatric: He has a normal mood and affect. His behavior is normal. Judgment and thought content normal.         ED Course   Procedures  Labs Reviewed - No data to display       Imaging Results    None          Medical Decision Making:   Initial Assessment:   Patient with pain to right foot x 2 weeks.  He denies pain or trauma  He was seen by Dr. Lee last week. Xrays were normal. Patient started on Neurontin for nerve pain and instructed to return in 1 week to discuss efficacy of treatment.  Patient reports burning sensation to top of right foot and heel despite Neurontin. He reports pain is worse at night  No redness, swelling or warmth    Patient with tenderness to top or right and heel  Differential Diagnosis:   Nerve pain  Cellulitis  Inflammatory process such as gout or arthritis  ED Management:  Med admin for pain    While in room patient called Dr. Lee and was able to schedule in appt for tomorrow.    Discussed physical exam findings with patient  No acute emergent medical condition identified at this time to warrant further testing/diagnostics.  Plan to discharge patient home with instructions per AVS.  Follow-up with Dr. Lee tomorrow or return to ED if symptoms worsen.  Patient  verbalized agreement to discharge treatment plan.                      Clinical Impression:       ICD-10-CM ICD-9-CM   1. Right foot pain M79.671 729.5   2. Neuritis M79.2 729.2                                Denise Cook NP  03/13/19 [id number]

## 2019-03-19 ENCOUNTER — HOSPITAL ENCOUNTER (EMERGENCY)
Facility: HOSPITAL | Age: 59
Discharge: ANOTHER HOSPITAL | End: 2019-03-19
Payer: MEDICARE

## 2019-03-19 VITALS
BODY MASS INDEX: 21.98 KG/M2 | DIASTOLIC BLOOD PRESSURE: 76 MMHG | HEART RATE: 91 BPM | WEIGHT: 145 LBS | OXYGEN SATURATION: 98 % | SYSTOLIC BLOOD PRESSURE: 132 MMHG | RESPIRATION RATE: 16 BRPM | TEMPERATURE: 98 F | HEIGHT: 68 IN

## 2019-03-19 DIAGNOSIS — I73.9 CLAUDICATION: ICD-10-CM

## 2019-03-19 DIAGNOSIS — I70.209 ARTERIAL OCCLUSION, LOWER EXTREMITY: Primary | ICD-10-CM

## 2019-03-19 LAB
ALBUMIN SERPL BCP-MCNC: 4.3 G/DL
ALP SERPL-CCNC: 115 U/L
ALT SERPL W/O P-5'-P-CCNC: 64 U/L
ANION GAP SERPL CALC-SCNC: 14 MMOL/L
APTT BLDCRRT: 27.2 SEC
AST SERPL-CCNC: 283 U/L
BASOPHILS # BLD AUTO: 0.04 K/UL
BASOPHILS NFR BLD: 0.3 %
BILIRUB SERPL-MCNC: 1 MG/DL
BUN SERPL-MCNC: 18 MG/DL
CALCIUM SERPL-MCNC: 9.3 MG/DL
CHLORIDE SERPL-SCNC: 99 MMOL/L
CO2 SERPL-SCNC: 25 MMOL/L
CREAT SERPL-MCNC: 0.9 MG/DL
DIFFERENTIAL METHOD: ABNORMAL
EOSINOPHIL # BLD AUTO: 0.1 K/UL
EOSINOPHIL NFR BLD: 0.5 %
ERYTHROCYTE [DISTWIDTH] IN BLOOD BY AUTOMATED COUNT: 13.4 %
EST. GFR  (AFRICAN AMERICAN): >60 ML/MIN/1.73 M^2
EST. GFR  (NON AFRICAN AMERICAN): >60 ML/MIN/1.73 M^2
GLUCOSE SERPL-MCNC: 94 MG/DL
HCT VFR BLD AUTO: 39.9 %
HGB BLD-MCNC: 14.2 G/DL
IMM GRANULOCYTES # BLD AUTO: 0.05 K/UL
IMM GRANULOCYTES NFR BLD AUTO: 0.4 %
INR PPP: 1
LACTATE SERPL-SCNC: 1 MMOL/L
LYMPHOCYTES # BLD AUTO: 1.8 K/UL
LYMPHOCYTES NFR BLD: 13.4 %
MCH RBC QN AUTO: 28 PG
MCHC RBC AUTO-ENTMCNC: 35.6 G/DL
MCV RBC AUTO: 79 FL
MONOCYTES # BLD AUTO: 0.7 K/UL
MONOCYTES NFR BLD: 5.4 %
NEUTROPHILS # BLD AUTO: 10.9 K/UL
NEUTROPHILS NFR BLD: 80 %
NRBC BLD-RTO: 0 /100 WBC
PLATELET # BLD AUTO: 359 K/UL
PMV BLD AUTO: 10.4 FL
POTASSIUM SERPL-SCNC: 3.5 MMOL/L
PROT SERPL-MCNC: 8.1 G/DL
PROTHROMBIN TIME: 11.8 SEC
RBC # BLD AUTO: 5.07 M/UL
SODIUM SERPL-SCNC: 138 MMOL/L
WBC # BLD AUTO: 13.62 K/UL

## 2019-03-19 PROCEDURE — 85610 PROTHROMBIN TIME: CPT

## 2019-03-19 PROCEDURE — 63600175 PHARM REV CODE 636 W HCPCS: Performed by: FAMILY MEDICINE

## 2019-03-19 PROCEDURE — 85730 THROMBOPLASTIN TIME PARTIAL: CPT

## 2019-03-19 PROCEDURE — 96375 TX/PRO/DX INJ NEW DRUG ADDON: CPT

## 2019-03-19 PROCEDURE — 93926 US LOWER EXTREMITY ARTERIES LEFT: ICD-10-PCS | Mod: 26,LT,, | Performed by: RADIOLOGY

## 2019-03-19 PROCEDURE — 80053 COMPREHEN METABOLIC PANEL: CPT

## 2019-03-19 PROCEDURE — 93926 LOWER EXTREMITY STUDY: CPT | Mod: TC,LT

## 2019-03-19 PROCEDURE — 96376 TX/PRO/DX INJ SAME DRUG ADON: CPT

## 2019-03-19 PROCEDURE — 85025 COMPLETE CBC W/AUTO DIFF WBC: CPT

## 2019-03-19 PROCEDURE — 99285 EMERGENCY DEPT VISIT HI MDM: CPT | Mod: 25

## 2019-03-19 PROCEDURE — 96365 THER/PROPH/DIAG IV INF INIT: CPT

## 2019-03-19 PROCEDURE — 93926 LOWER EXTREMITY STUDY: CPT | Mod: 26,LT,, | Performed by: RADIOLOGY

## 2019-03-19 PROCEDURE — 83605 ASSAY OF LACTIC ACID: CPT

## 2019-03-19 RX ORDER — HYDROMORPHONE HYDROCHLORIDE 2 MG/ML
2 INJECTION, SOLUTION INTRAMUSCULAR; INTRAVENOUS; SUBCUTANEOUS
Status: COMPLETED | OUTPATIENT
Start: 2019-03-19 | End: 2019-03-19

## 2019-03-19 RX ORDER — HEPARIN SODIUM 10000 [USP'U]/100ML
18 INJECTION, SOLUTION INTRAVENOUS
Status: DISCONTINUED | OUTPATIENT
Start: 2019-03-19 | End: 2019-03-19

## 2019-03-19 RX ORDER — HEPARIN SODIUM 5000 [USP'U]/ML
5000 INJECTION, SOLUTION INTRAVENOUS; SUBCUTANEOUS
Status: COMPLETED | OUTPATIENT
Start: 2019-03-19 | End: 2019-03-19

## 2019-03-19 RX ORDER — HEPARIN SODIUM,PORCINE/D5W 25000/250
1000 INTRAVENOUS SOLUTION INTRAVENOUS CONTINUOUS
Status: DISCONTINUED | OUTPATIENT
Start: 2019-03-19 | End: 2019-03-19

## 2019-03-19 RX ORDER — HYDROMORPHONE HYDROCHLORIDE 2 MG/ML
1 INJECTION, SOLUTION INTRAMUSCULAR; INTRAVENOUS; SUBCUTANEOUS
Status: COMPLETED | OUTPATIENT
Start: 2019-03-19 | End: 2019-03-19

## 2019-03-19 RX ORDER — HEPARIN SODIUM,PORCINE/D5W 25000/250
1000 INTRAVENOUS SOLUTION INTRAVENOUS CONTINUOUS
Status: DISCONTINUED | OUTPATIENT
Start: 2019-03-19 | End: 2019-03-20 | Stop reason: HOSPADM

## 2019-03-19 RX ORDER — HEPARIN SODIUM 10000 [USP'U]/100ML
18 INJECTION, SOLUTION INTRAVENOUS
Status: COMPLETED | OUTPATIENT
Start: 2019-03-19 | End: 2019-03-19

## 2019-03-19 RX ADMIN — HYDROMORPHONE HYDROCHLORIDE 2 MG: 2 INJECTION, SOLUTION INTRAMUSCULAR; INTRAVENOUS; SUBCUTANEOUS at 09:03

## 2019-03-19 RX ADMIN — HEPARIN SODIUM 18 UNITS/KG/HR: 10000 INJECTION, SOLUTION INTRAVENOUS at 11:03

## 2019-03-19 RX ADMIN — HYDROMORPHONE HYDROCHLORIDE 1 MG: 2 INJECTION, SOLUTION INTRAMUSCULAR; INTRAVENOUS; SUBCUTANEOUS at 11:03

## 2019-03-19 RX ADMIN — HEPARIN SODIUM 5000 UNITS: 5000 INJECTION, SOLUTION INTRAVENOUS; SUBCUTANEOUS at 11:03

## 2019-03-20 ENCOUNTER — ANESTHESIA EVENT (OUTPATIENT)
Dept: SURGERY | Facility: HOSPITAL | Age: 59
DRG: 241 | End: 2019-03-20
Payer: MEDICARE

## 2019-03-20 ENCOUNTER — HOSPITAL ENCOUNTER (INPATIENT)
Facility: HOSPITAL | Age: 59
LOS: 8 days | Discharge: NURSING, REHAB OR LONG-TERM CARE | DRG: 241 | End: 2019-03-28
Attending: EMERGENCY MEDICINE | Admitting: SURGERY
Payer: MEDICARE

## 2019-03-20 ENCOUNTER — ANESTHESIA (OUTPATIENT)
Dept: SURGERY | Facility: HOSPITAL | Age: 59
DRG: 241 | End: 2019-03-20
Payer: MEDICARE

## 2019-03-20 DIAGNOSIS — Z74.09 IMPAIRED MOBILITY AND ACTIVITIES OF DAILY LIVING: ICD-10-CM

## 2019-03-20 DIAGNOSIS — Z78.9 IMPAIRED MOBILITY AND ACTIVITIES OF DAILY LIVING: ICD-10-CM

## 2019-03-20 DIAGNOSIS — I70.229 CRITICAL LOWER LIMB ISCHEMIA: ICD-10-CM

## 2019-03-20 PROBLEM — G80.8 OTHER CEREBRAL PALSY: Status: ACTIVE | Noted: 2019-03-20

## 2019-03-20 LAB
ALBUMIN SERPL BCP-MCNC: 3.6 G/DL
ALP SERPL-CCNC: 115 U/L
ALT SERPL W/O P-5'-P-CCNC: 62 U/L
ANION GAP SERPL CALC-SCNC: 9 MMOL/L
APTT BLDCRRT: 46.6 SEC
APTT BLDCRRT: 66.1 SEC
APTT BLDCRRT: >150 SEC
AST SERPL-CCNC: 267 U/L
BASOPHILS # BLD AUTO: 0.05 K/UL
BASOPHILS NFR BLD: 0.4 %
BILIRUB SERPL-MCNC: 0.5 MG/DL
BUN SERPL-MCNC: 17 MG/DL
CALCIUM SERPL-MCNC: 9.6 MG/DL
CHLORIDE SERPL-SCNC: 105 MMOL/L
CO2 SERPL-SCNC: 25 MMOL/L
CREAT SERPL-MCNC: 0.8 MG/DL
DIFFERENTIAL METHOD: ABNORMAL
EOSINOPHIL # BLD AUTO: 0.1 K/UL
EOSINOPHIL NFR BLD: 0.7 %
ERYTHROCYTE [DISTWIDTH] IN BLOOD BY AUTOMATED COUNT: 13.5 %
EST. GFR  (AFRICAN AMERICAN): >60 ML/MIN/1.73 M^2
EST. GFR  (NON AFRICAN AMERICAN): >60 ML/MIN/1.73 M^2
GLUCOSE SERPL-MCNC: 94 MG/DL
HCT VFR BLD AUTO: 37.5 %
HGB BLD-MCNC: 13.2 G/DL
IMM GRANULOCYTES # BLD AUTO: 0.04 K/UL
IMM GRANULOCYTES NFR BLD AUTO: 0.3 %
INR PPP: 1
LYMPHOCYTES # BLD AUTO: 3.2 K/UL
LYMPHOCYTES NFR BLD: 26.6 %
MCH RBC QN AUTO: 27.7 PG
MCHC RBC AUTO-ENTMCNC: 35.2 G/DL
MCV RBC AUTO: 79 FL
MONOCYTES # BLD AUTO: 0.8 K/UL
MONOCYTES NFR BLD: 6.5 %
NEUTROPHILS # BLD AUTO: 7.7 K/UL
NEUTROPHILS NFR BLD: 65.5 %
NRBC BLD-RTO: 0 /100 WBC
PLATELET # BLD AUTO: 352 K/UL
PMV BLD AUTO: 10.5 FL
POTASSIUM SERPL-SCNC: 4.2 MMOL/L
PROT SERPL-MCNC: 7.2 G/DL
PROTHROMBIN TIME: 10.1 SEC
RBC # BLD AUTO: 4.76 M/UL
SODIUM SERPL-SCNC: 139 MMOL/L
WBC # BLD AUTO: 11.83 K/UL

## 2019-03-20 PROCEDURE — 63600175 PHARM REV CODE 636 W HCPCS: Performed by: SURGERY

## 2019-03-20 PROCEDURE — 99285 EMERGENCY DEPT VISIT HI MDM: CPT | Mod: ,,, | Performed by: EMERGENCY MEDICINE

## 2019-03-20 PROCEDURE — 27590 PR AMPUTATE THIGH,THRU FEMUR: ICD-10-PCS | Mod: LT,,, | Performed by: SURGERY

## 2019-03-20 PROCEDURE — S5010 5% DEXTROSE AND 0.45% SALINE: HCPCS | Performed by: SURGERY

## 2019-03-20 PROCEDURE — 25000003 PHARM REV CODE 250: Performed by: NURSE ANESTHETIST, CERTIFIED REGISTERED

## 2019-03-20 PROCEDURE — D9220A PRA ANESTHESIA: Mod: ANES,,, | Performed by: ANESTHESIOLOGY

## 2019-03-20 PROCEDURE — D9220A PRA ANESTHESIA: ICD-10-PCS | Mod: CRNA,,, | Performed by: NURSE ANESTHETIST, CERTIFIED REGISTERED

## 2019-03-20 PROCEDURE — 94761 N-INVAS EAR/PLS OXIMETRY MLT: CPT

## 2019-03-20 PROCEDURE — 85730 THROMBOPLASTIN TIME PARTIAL: CPT | Mod: 91

## 2019-03-20 PROCEDURE — 88307 TISSUE SPECIMEN TO PATHOLOGY - SURGERY: ICD-10-PCS | Mod: 26,,, | Performed by: PATHOLOGY

## 2019-03-20 PROCEDURE — 37000008 HC ANESTHESIA 1ST 15 MINUTES: Performed by: SURGERY

## 2019-03-20 PROCEDURE — 27200665 HC NERVE BLOCK NEEDLE/ CATHETER: Performed by: NURSE ANESTHETIST, CERTIFIED REGISTERED

## 2019-03-20 PROCEDURE — 12000002 HC ACUTE/MED SURGE SEMI-PRIVATE ROOM

## 2019-03-20 PROCEDURE — 99285 EMERGENCY DEPT VISIT HI MDM: CPT | Mod: 25

## 2019-03-20 PROCEDURE — 27200664 HC NERVE BLOCK COMPLETE KIT: Performed by: NURSE ANESTHETIST, CERTIFIED REGISTERED

## 2019-03-20 PROCEDURE — 85730 THROMBOPLASTIN TIME PARTIAL: CPT

## 2019-03-20 PROCEDURE — 88307 TISSUE EXAM BY PATHOLOGIST: CPT | Performed by: PATHOLOGY

## 2019-03-20 PROCEDURE — 25000003 PHARM REV CODE 250: Performed by: SURGERY

## 2019-03-20 PROCEDURE — 27100025 HC TUBING, SET FLUID WARMER: Performed by: NURSE ANESTHETIST, CERTIFIED REGISTERED

## 2019-03-20 PROCEDURE — 63600175 PHARM REV CODE 636 W HCPCS: Performed by: EMERGENCY MEDICINE

## 2019-03-20 PROCEDURE — 96361 HYDRATE IV INFUSION ADD-ON: CPT

## 2019-03-20 PROCEDURE — 99285 PR EMERGENCY DEPT VISIT,LEVEL V: ICD-10-PCS | Mod: ,,, | Performed by: EMERGENCY MEDICINE

## 2019-03-20 PROCEDURE — 99223 PR INITIAL HOSPITAL CARE,LEVL III: ICD-10-PCS | Mod: AI,57,, | Performed by: SURGERY

## 2019-03-20 PROCEDURE — D9220A PRA ANESTHESIA: Mod: CRNA,,, | Performed by: NURSE ANESTHETIST, CERTIFIED REGISTERED

## 2019-03-20 PROCEDURE — S0028 INJECTION, FAMOTIDINE, 20 MG: HCPCS | Performed by: NURSE ANESTHETIST, CERTIFIED REGISTERED

## 2019-03-20 PROCEDURE — 63600175 PHARM REV CODE 636 W HCPCS: Performed by: NURSE ANESTHETIST, CERTIFIED REGISTERED

## 2019-03-20 PROCEDURE — D9220A PRA ANESTHESIA: ICD-10-PCS | Mod: ANES,,, | Performed by: ANESTHESIOLOGY

## 2019-03-20 PROCEDURE — 25000003 PHARM REV CODE 250: Performed by: ANESTHESIOLOGY

## 2019-03-20 PROCEDURE — 76942 ECHO GUIDE FOR BIOPSY: CPT | Mod: 26,,, | Performed by: ANESTHESIOLOGY

## 2019-03-20 PROCEDURE — 36000710: Performed by: SURGERY

## 2019-03-20 PROCEDURE — 76942 PR U/S GUIDANCE FOR NEEDLE GUIDANCE: ICD-10-PCS | Mod: 26,,, | Performed by: ANESTHESIOLOGY

## 2019-03-20 PROCEDURE — 85025 COMPLETE CBC W/AUTO DIFF WBC: CPT

## 2019-03-20 PROCEDURE — 99223 1ST HOSP IP/OBS HIGH 75: CPT | Mod: AI,57,, | Performed by: SURGERY

## 2019-03-20 PROCEDURE — 71000033 HC RECOVERY, INTIAL HOUR: Performed by: SURGERY

## 2019-03-20 PROCEDURE — 96374 THER/PROPH/DIAG INJ IV PUSH: CPT

## 2019-03-20 PROCEDURE — 36000711: Performed by: SURGERY

## 2019-03-20 PROCEDURE — C1729 CATH, DRAINAGE: HCPCS | Performed by: SURGERY

## 2019-03-20 PROCEDURE — 71000039 HC RECOVERY, EACH ADD'L HOUR: Performed by: SURGERY

## 2019-03-20 PROCEDURE — 37000009 HC ANESTHESIA EA ADD 15 MINS: Performed by: SURGERY

## 2019-03-20 PROCEDURE — 64447 PR NERVE BLOCK INJ, ANES/STEROID, FEMORAL, INCL IMAG GUIDANCE: ICD-10-PCS | Mod: 59,LT,, | Performed by: ANESTHESIOLOGY

## 2019-03-20 PROCEDURE — 85610 PROTHROMBIN TIME: CPT

## 2019-03-20 PROCEDURE — 64447 NJX AA&/STRD FEMORAL NRV IMG: CPT | Mod: 59,LT,, | Performed by: ANESTHESIOLOGY

## 2019-03-20 PROCEDURE — 25500020 PHARM REV CODE 255: Performed by: EMERGENCY MEDICINE

## 2019-03-20 PROCEDURE — 96375 TX/PRO/DX INJ NEW DRUG ADDON: CPT

## 2019-03-20 PROCEDURE — 80053 COMPREHEN METABOLIC PANEL: CPT

## 2019-03-20 PROCEDURE — 27200703 HC ULTRASOUND NDL GUIDE: Performed by: NURSE ANESTHETIST, CERTIFIED REGISTERED

## 2019-03-20 PROCEDURE — 27590 AMPUTATE LEG AT THIGH: CPT | Mod: LT,,, | Performed by: SURGERY

## 2019-03-20 RX ORDER — ONDANSETRON 2 MG/ML
4 INJECTION INTRAMUSCULAR; INTRAVENOUS
Status: COMPLETED | OUTPATIENT
Start: 2019-03-20 | End: 2019-03-20

## 2019-03-20 RX ORDER — NALOXONE HCL 0.4 MG/ML
0.02 VIAL (ML) INJECTION
Status: DISCONTINUED | OUTPATIENT
Start: 2019-03-20 | End: 2019-03-22

## 2019-03-20 RX ORDER — ASPIRIN 81 MG/1
81 TABLET ORAL DAILY
Status: DISCONTINUED | OUTPATIENT
Start: 2019-03-21 | End: 2019-03-28 | Stop reason: HOSPADM

## 2019-03-20 RX ORDER — PROPOFOL 10 MG/ML
VIAL (ML) INTRAVENOUS
Status: DISCONTINUED | OUTPATIENT
Start: 2019-03-20 | End: 2019-03-20

## 2019-03-20 RX ORDER — PHENYLEPHRINE HYDROCHLORIDE 10 MG/ML
INJECTION INTRAVENOUS
Status: DISCONTINUED | OUTPATIENT
Start: 2019-03-20 | End: 2019-03-20

## 2019-03-20 RX ORDER — CEFAZOLIN SODIUM 1 G/3ML
INJECTION, POWDER, FOR SOLUTION INTRAMUSCULAR; INTRAVENOUS
Status: DISCONTINUED | OUTPATIENT
Start: 2019-03-20 | End: 2019-03-20

## 2019-03-20 RX ORDER — SODIUM CHLORIDE 0.9 % (FLUSH) 0.9 %
3 SYRINGE (ML) INJECTION
Status: DISCONTINUED | OUTPATIENT
Start: 2019-03-20 | End: 2019-03-28 | Stop reason: HOSPADM

## 2019-03-20 RX ORDER — FAMOTIDINE 10 MG/ML
INJECTION INTRAVENOUS
Status: DISCONTINUED | OUTPATIENT
Start: 2019-03-20 | End: 2019-03-20

## 2019-03-20 RX ORDER — SODIUM CHLORIDE 0.9 % (FLUSH) 0.9 %
3 SYRINGE (ML) INJECTION
Status: DISCONTINUED | OUTPATIENT
Start: 2019-03-20 | End: 2019-03-20 | Stop reason: SDUPTHER

## 2019-03-20 RX ORDER — ONDANSETRON 2 MG/ML
INJECTION INTRAMUSCULAR; INTRAVENOUS
Status: DISCONTINUED | OUTPATIENT
Start: 2019-03-20 | End: 2019-03-20

## 2019-03-20 RX ORDER — DEXAMETHASONE SODIUM PHOSPHATE 4 MG/ML
INJECTION, SOLUTION INTRA-ARTICULAR; INTRALESIONAL; INTRAMUSCULAR; INTRAVENOUS; SOFT TISSUE
Status: DISCONTINUED | OUTPATIENT
Start: 2019-03-20 | End: 2019-03-20

## 2019-03-20 RX ORDER — HYDROMORPHONE HYDROCHLORIDE 1 MG/ML
1 INJECTION, SOLUTION INTRAMUSCULAR; INTRAVENOUS; SUBCUTANEOUS
Status: COMPLETED | OUTPATIENT
Start: 2019-03-20 | End: 2019-03-20

## 2019-03-20 RX ORDER — HYDROCODONE BITARTRATE AND ACETAMINOPHEN 5; 325 MG/1; MG/1
1 TABLET ORAL EVERY 4 HOURS PRN
Status: DISCONTINUED | OUTPATIENT
Start: 2019-03-20 | End: 2019-03-28 | Stop reason: HOSPADM

## 2019-03-20 RX ORDER — MIDAZOLAM HYDROCHLORIDE 1 MG/ML
INJECTION, SOLUTION INTRAMUSCULAR; INTRAVENOUS
Status: DISCONTINUED | OUTPATIENT
Start: 2019-03-20 | End: 2019-03-20

## 2019-03-20 RX ORDER — FENTANYL CITRATE 50 UG/ML
INJECTION, SOLUTION INTRAMUSCULAR; INTRAVENOUS
Status: DISCONTINUED | OUTPATIENT
Start: 2019-03-20 | End: 2019-03-20

## 2019-03-20 RX ORDER — ROCURONIUM BROMIDE 10 MG/ML
INJECTION, SOLUTION INTRAVENOUS
Status: DISCONTINUED | OUTPATIENT
Start: 2019-03-20 | End: 2019-03-20

## 2019-03-20 RX ORDER — METOPROLOL SUCCINATE 50 MG/1
50 TABLET, EXTENDED RELEASE ORAL DAILY
Status: DISCONTINUED | OUTPATIENT
Start: 2019-03-21 | End: 2019-03-28 | Stop reason: HOSPADM

## 2019-03-20 RX ORDER — BUPIVACAINE HYDROCHLORIDE AND EPINEPHRINE 5; 5 MG/ML; UG/ML
INJECTION, SOLUTION EPIDURAL; INTRACAUDAL; PERINEURAL
Status: COMPLETED | OUTPATIENT
Start: 2019-03-20 | End: 2019-03-20

## 2019-03-20 RX ORDER — AMLODIPINE BESYLATE 5 MG/1
5 TABLET ORAL DAILY
Status: DISCONTINUED | OUTPATIENT
Start: 2019-03-21 | End: 2019-03-26

## 2019-03-20 RX ORDER — HYDROMORPHONE HYDROCHLORIDE 1 MG/ML
0.5 INJECTION, SOLUTION INTRAMUSCULAR; INTRAVENOUS; SUBCUTANEOUS
Status: COMPLETED | OUTPATIENT
Start: 2019-03-20 | End: 2019-03-27

## 2019-03-20 RX ORDER — ONDANSETRON 2 MG/ML
4 INJECTION INTRAMUSCULAR; INTRAVENOUS DAILY PRN
Status: DISCONTINUED | OUTPATIENT
Start: 2019-03-20 | End: 2019-03-20 | Stop reason: HOSPADM

## 2019-03-20 RX ORDER — HYDROMORPHONE HYDROCHLORIDE 1 MG/ML
0.2 INJECTION, SOLUTION INTRAMUSCULAR; INTRAVENOUS; SUBCUTANEOUS EVERY 5 MIN PRN
Status: DISCONTINUED | OUTPATIENT
Start: 2019-03-20 | End: 2019-03-20 | Stop reason: HOSPADM

## 2019-03-20 RX ORDER — HYDROMORPHONE HCL IN 0.9% NACL 6 MG/30 ML
PATIENT CONTROLLED ANALGESIA SYRINGE INTRAVENOUS CONTINUOUS
Status: DISCONTINUED | OUTPATIENT
Start: 2019-03-20 | End: 2019-03-22

## 2019-03-20 RX ORDER — SODIUM CHLORIDE 9 MG/ML
INJECTION, SOLUTION INTRAVENOUS CONTINUOUS
Status: DISCONTINUED | OUTPATIENT
Start: 2019-03-20 | End: 2019-03-22

## 2019-03-20 RX ORDER — DEXTROSE MONOHYDRATE AND SODIUM CHLORIDE 5; .45 G/100ML; G/100ML
INJECTION, SOLUTION INTRAVENOUS CONTINUOUS
Status: DISCONTINUED | OUTPATIENT
Start: 2019-03-20 | End: 2019-03-22

## 2019-03-20 RX ORDER — BACITRACIN 50000 [IU]/1
INJECTION, POWDER, FOR SOLUTION INTRAMUSCULAR
Status: DISCONTINUED | OUTPATIENT
Start: 2019-03-20 | End: 2019-03-20 | Stop reason: HOSPADM

## 2019-03-20 RX ORDER — CEFAZOLIN SODIUM 1 G/3ML
2 INJECTION, POWDER, FOR SOLUTION INTRAMUSCULAR; INTRAVENOUS
Status: COMPLETED | OUTPATIENT
Start: 2019-03-21 | End: 2019-03-21

## 2019-03-20 RX ORDER — SUCCINYLCHOLINE CHLORIDE 20 MG/ML
INJECTION INTRAMUSCULAR; INTRAVENOUS
Status: DISCONTINUED | OUTPATIENT
Start: 2019-03-20 | End: 2019-03-20

## 2019-03-20 RX ORDER — MORPHINE SULFATE 4 MG/ML
4 INJECTION, SOLUTION INTRAMUSCULAR; INTRAVENOUS
Status: COMPLETED | OUTPATIENT
Start: 2019-03-20 | End: 2019-03-20

## 2019-03-20 RX ORDER — HEPARIN SODIUM,PORCINE/D5W 25000/250
18 INTRAVENOUS SOLUTION INTRAVENOUS CONTINUOUS
Status: DISCONTINUED | OUTPATIENT
Start: 2019-03-20 | End: 2019-03-20

## 2019-03-20 RX ORDER — LIDOCAINE HCL/PF 100 MG/5ML
SYRINGE (ML) INTRAVENOUS
Status: DISCONTINUED | OUTPATIENT
Start: 2019-03-20 | End: 2019-03-20

## 2019-03-20 RX ADMIN — FENTANYL CITRATE 100 MCG: 50 INJECTION, SOLUTION INTRAMUSCULAR; INTRAVENOUS at 03:03

## 2019-03-20 RX ADMIN — SODIUM CHLORIDE, SODIUM GLUCONATE, SODIUM ACETATE, POTASSIUM CHLORIDE, MAGNESIUM CHLORIDE, SODIUM PHOSPHATE, DIBASIC, AND POTASSIUM PHOSPHATE: .53; .5; .37; .037; .03; .012; .00082 INJECTION, SOLUTION INTRAVENOUS at 03:03

## 2019-03-20 RX ADMIN — FAMOTIDINE 20 MG: 10 INJECTION, SOLUTION INTRAVENOUS at 04:03

## 2019-03-20 RX ADMIN — CEFAZOLIN 2 G: 330 INJECTION, POWDER, FOR SOLUTION INTRAMUSCULAR; INTRAVENOUS at 03:03

## 2019-03-20 RX ADMIN — HYDROMORPHONE HYDROCHLORIDE 1 MG: 1 INJECTION, SOLUTION INTRAMUSCULAR; INTRAVENOUS; SUBCUTANEOUS at 01:03

## 2019-03-20 RX ADMIN — IOHEXOL 125 ML: 350 INJECTION, SOLUTION INTRAVENOUS at 02:03

## 2019-03-20 RX ADMIN — HYDROCODONE BITARTRATE AND ACETAMINOPHEN 1 TABLET: 5; 325 TABLET ORAL at 10:03

## 2019-03-20 RX ADMIN — MIDAZOLAM HYDROCHLORIDE 2 MG: 1 INJECTION, SOLUTION INTRAMUSCULAR; INTRAVENOUS at 03:03

## 2019-03-20 RX ADMIN — DEXTROSE AND SODIUM CHLORIDE: 5; .45 INJECTION, SOLUTION INTRAVENOUS at 06:03

## 2019-03-20 RX ADMIN — ONDANSETRON 4 MG: 2 INJECTION INTRAMUSCULAR; INTRAVENOUS at 01:03

## 2019-03-20 RX ADMIN — HEPARIN SODIUM AND DEXTROSE 18 UNITS/KG/HR: 10000; 5 INJECTION INTRAVENOUS at 03:03

## 2019-03-20 RX ADMIN — PROPOFOL 180 MG: 10 INJECTION, EMULSION INTRAVENOUS at 03:03

## 2019-03-20 RX ADMIN — DEXAMETHASONE SODIUM PHOSPHATE 4 MG: 4 INJECTION, SOLUTION INTRAMUSCULAR; INTRAVENOUS at 04:03

## 2019-03-20 RX ADMIN — Medication: at 06:03

## 2019-03-20 RX ADMIN — SODIUM CHLORIDE, SODIUM LACTATE, POTASSIUM CHLORIDE, AND CALCIUM CHLORIDE 1000 ML: .6; .31; .03; .02 INJECTION, SOLUTION INTRAVENOUS at 02:03

## 2019-03-20 RX ADMIN — BUPIVACAINE HYDROCHLORIDE AND EPINEPHRINE BITARTRATE 20 ML: 5; .0091 INJECTION, SOLUTION EPIDURAL; INTRACAUDAL; PERINEURAL at 05:03

## 2019-03-20 RX ADMIN — ROCURONIUM BROMIDE 10 MG: 10 INJECTION, SOLUTION INTRAVENOUS at 03:03

## 2019-03-20 RX ADMIN — DEXTROSE AND SODIUM CHLORIDE: 5; .45 INJECTION, SOLUTION INTRAVENOUS at 04:03

## 2019-03-20 RX ADMIN — ONDANSETRON 4 MG: 2 INJECTION INTRAMUSCULAR; INTRAVENOUS at 04:03

## 2019-03-20 RX ADMIN — FENTANYL CITRATE 50 MCG: 50 INJECTION, SOLUTION INTRAMUSCULAR; INTRAVENOUS at 04:03

## 2019-03-20 RX ADMIN — SODIUM CHLORIDE, SODIUM GLUCONATE, SODIUM ACETATE, POTASSIUM CHLORIDE, MAGNESIUM CHLORIDE, SODIUM PHOSPHATE, DIBASIC, AND POTASSIUM PHOSPHATE: .53; .5; .37; .037; .03; .012; .00082 INJECTION, SOLUTION INTRAVENOUS at 04:03

## 2019-03-20 RX ADMIN — FENTANYL CITRATE 50 MCG: 50 INJECTION, SOLUTION INTRAMUSCULAR; INTRAVENOUS at 05:03

## 2019-03-20 RX ADMIN — FENTANYL CITRATE 50 MCG: 50 INJECTION, SOLUTION INTRAMUSCULAR; INTRAVENOUS at 03:03

## 2019-03-20 RX ADMIN — PHENYLEPHRINE HYDROCHLORIDE 100 MCG: 10 INJECTION INTRAVENOUS at 03:03

## 2019-03-20 RX ADMIN — PHENYLEPHRINE HYDROCHLORIDE 100 MCG: 10 INJECTION INTRAVENOUS at 04:03

## 2019-03-20 RX ADMIN — MORPHINE SULFATE 4 MG: 4 INJECTION, SOLUTION INTRAMUSCULAR; INTRAVENOUS at 03:03

## 2019-03-20 RX ADMIN — SUCCINYLCHOLINE CHLORIDE 130 MG: 20 INJECTION, SOLUTION INTRAMUSCULAR; INTRAVENOUS at 03:03

## 2019-03-20 RX ADMIN — LIDOCAINE HYDROCHLORIDE 100 MG: 20 INJECTION, SOLUTION INTRAVENOUS at 03:03

## 2019-03-20 RX ADMIN — HYDROMORPHONE HYDROCHLORIDE 0.5 MG: 1 INJECTION, SOLUTION INTRAMUSCULAR; INTRAVENOUS; SUBCUTANEOUS at 03:03

## 2019-03-20 RX ADMIN — PHENYLEPHRINE HYDROCHLORIDE 100 MCG: 10 INJECTION INTRAVENOUS at 05:03

## 2019-03-20 NOTE — ED NOTES
Patient is resting comfortably.No acute distress. Lower extremities are cool to touch.Dusky in apprearance. Patient relays that he had a blood flow study at [facility] on 3/14 which showed decreased blood flow in legs. Patient was discharged and instructed to follow up this week.

## 2019-03-20 NOTE — PLAN OF CARE
Spoke to patient's sister and updated her on situation. She stated she would talk with the rest of the family and call back with their decision.    Isaías Ronquillo MD   Fellow, Vascular/Endovascular Surgery      Addendum:    Family wishes for amputation to be done here. Plan for emergent L AKA.     Isaías Ronquillo MD   Fellow, Vascular/Endovascular Surgery

## 2019-03-20 NOTE — ED NOTES
Called [facility] to request transport, spoke to Roslyn. Requested priority 2 transport to [facility].

## 2019-03-20 NOTE — ED NOTES
Assumed care of patient. Patient resting quietly in bed, no apparent distress noted, resp w/ ease, vitals stable. Patient updated with wait time and current plan of care, no additional questions or concerns at this time.    LOC: The patient is awake, alert, and aware of environment. The patient is oriented x 3 and speaking appropriately.   APPEARANCE: No acute distress noted.   PSYCHOSOCIAL: Patient is calm and cooperative.   SKIN: The skin is warm, dry.   RESPIRATORY: Airway is open and patent. Bilateral chest rise and fall. Respirations are spontaneous, even and unlabored. Normal effort and rate noted. No accessory muscle use noted.   CARDIAC: Patient has a normal rate and rhythm. Denies chest pain or SOB.   ABDOMEN: Soft and non tender to palpation. No distention noted.   URINARY:  Voids independently.   EXTREMITIES: No swelling noted.   NEUROLOGIC: Eyes open spontaneously. Speech clear. Tolerating saliva secretions well. Able to follow commands, demonstrating ability to actively and appropriately communicate within context of current conversation. Symmetrical facial muscles. Moving all extremities well. Movement is purposeful.   MUSCULOSKELETAL: No obvious deformities noted. No palpable pulse present to L dorsalis pedis or L posterior tibialis. L extremity cold from knee down. Patient unable to move his L toes, but able to lift his leg from his hip. Reports pain primarily to the back of his heel. Denies any loss of sensation or changes in sensation to L leg. Capillary refill delayed. 2+ palpable pulses present in R leg (both DP and PT). Normal neurovascular function present in R leg. No discoloration noted or pain reported by patient.

## 2019-03-20 NOTE — ANESTHESIA PREPROCEDURE EVALUATION
[facility]  Anesthesia Pre-Operative Evaluation         Patient Name: Pranay Colindres  YOB: 1960  MRN: [id number]    SUBJECTIVE:     Pre-operative evaluation for Procedure(s) (LRB):  AMPUTATION, ABOVE KNEE (Left)     03/20/2019    Pranay Colindres is a 59 y.o. male w/ a significant PMHx of HTN, arthritis, and cerebral palsy, tobacco abuse who presents to the ED as a transfer from [facility] with a complaint of pulseless left foot.    Access: Left forearm 18g, right AC 20g.  NPO: Solids.8hrs, liquids >6hrs.    Patient now presents for the above procedure(s).      LDA:        Peripheral IV - Single Lumen Left Forearm (Active)   Site Assessment Clean;Dry;Intact;No redness;No swelling 3/20/2019  7:00 AM   Line Status Saline locked 3/20/2019  1:51 AM   Dressing Status Clean;Dry;Intact 3/20/2019  7:00 AM   Dressing Intervention New dressing 3/20/2019  7:00 AM   Number of days:             Peripheral IV - Double Lumen 03/19/19 2000 Right Antecubital (Active)   Site Assessment Clean;Dry;Intact;No redness;No swelling 3/20/2019  7:00 AM   Line 1 Status Infusing 3/20/2019  7:00 AM   Line 2 Status Saline locked 3/20/2019  7:00 AM   Dressing Status Clean;Dry;Intact 3/20/2019  7:00 AM   Dressing Intervention New dressing 3/19/2019  9:37 PM   Reason Not Rotated Not due 3/19/2019  9:37 PM   Number of days: 0       Prev airway: None documented.    Drips:    dextrose 5 % and 0.45 % NaCl 100 mL/hr at 03/20/19 0420    heparin (porcine) in D5W 18 Units/kg/hr (03/20/19 0342)    hydromorphone in 0.9 % NaCl 6 mg/30 ml         Patient Active Problem List   Diagnosis    Encounter for screening colonoscopy    Foot pain, right    Neuritis    Critical lower limb ischemia    Other cerebral palsy       Review of patient's allergies indicates:  No Known Allergies    Current Inpatient Medications:      No current facility-administered medications on file prior to encounter.      Current Outpatient  Medications on File Prior to Encounter   Medication Sig Dispense Refill    amLODIPine (NORVASC) 5 MG tablet amlodipine 5 mg tablet      gabapentin (NEURONTIN) 300 MG capsule Take 1 capsule (300 mg total) by mouth every evening. 30 capsule 6    metoprolol succinate (TOPROL-XL) 50 MG 24 hr tablet Take 50 mg by mouth once daily.      metoprolol tartrate (LOPRESSOR) 50 MG tablet       traMADol (ULTRAM) 50 mg tablet Take 1 tablet (50 mg total) by mouth every 6 (six) hours as needed for Pain. 3 tablet 0    latanoprost 0.005 % ophthalmic solution          Past Surgical History:   Procedure Laterality Date    ABDOMINAL SURGERY      AMPUTATION      right foot 5th digit    COLONOSCOPY  2018    COLONOSCOPY N/A 2018    [facility]    ESOPHAGOGASTRODUODENOSCOPY  2018    ESOPHAGOGASTRODUODENOSCOPY (EGD) N/A 2018    [facility] END[facility]    HIP SURGERY Bilateral     x2       Social History     Socioeconomic History    Marital status: Single     Spouse name: Not on file    Number of children: Not on file    Years of education: Not on file    Highest education level: Not on file   Social Needs    Financial resource strain: Not on file    Food insecurity - worry: Not on file    Food insecurity - inability: Not on file    Transportation needs - medical: Not on file    Transportation needs - non-medical: Not on file   Occupational History    Not on file   Tobacco Use    Smoking status: Former Smoker     Types: Cigarettes     Last attempt to quit: 3/12/2019     Years since quittin.0    Smokeless tobacco: Never Used   Substance and Sexual Activity    Alcohol use: Yes     Comment: 1-2    Drug use: No    Sexual activity: Not on file   Other Topics Concern    Not on file   Social History Narrative    Not on file       OBJECTIVE:     Vital Signs Range (Last 24H):  Temp:  [36.3 °C (97.4 °F)-37.1 °C (98.8 °F)]   Pulse:   []   Resp:  [14-30]   BP: (129-171)/()   SpO2:  [95 %-100 %]       Significant Labs:  Lab Results   Component Value Date    WBC 11.83 03/20/2019    HGB 13.2 (L) 03/20/2019    HCT 37.5 (L) 03/20/2019     (H) 03/20/2019    ALT 62 (H) 03/20/2019     (H) 03/20/2019     03/20/2019    K 4.2 03/20/2019     03/20/2019    CREATININE 0.8 03/20/2019    BUN 17 03/20/2019    CO2 25 03/20/2019    INR 1.0 03/20/2019     EKG: sinus mike    2D ECHO:  No results found for this or any previous visit.      ASSESSMENT/PLAN:       Anesthesia Evaluation    I have reviewed the Patient Summary Reports.    I have reviewed the Nursing Notes.   I have reviewed the Medications.     Review of Systems  Anesthesia Hx:  No problems with previous Anesthesia  History of prior surgery of interest to airway management or planning:  Denies Personal Hx of Anesthesia complications.   Social:  Smoker    Hematology/Oncology:         -- Anemia:   Cardiovascular:   Hypertension PVD  Peripheral Arterial Disease  Hypertension    Pulmonary:  Pulmonary Normal    Hepatic/GI:  Hepatic/GI Normal    Musculoskeletal:  Musculoskeletal General/Symptoms: Functional capacity is ambulatory with walker.    Neurological:   Cerebral palsey   Endocrine:  Endocrine Normal        Physical Exam  General:  Well nourished    Airway/Jaw/Neck:  Airway Findings: Mouth Opening: Normal Tongue: Normal  General Airway Assessment: Adult  Mallampati: II  Improves to I with phonation.  TM Distance: Normal, at least 6 cm  Jaw/Neck Findings:  Neck ROM: Normal ROM      Dental:  Dental Findings:    Chest/Lungs:  Chest/Lungs Findings: Clear to auscultation     Heart/Vascular:  Heart Findings: Rate: Normal  Rhythm: Regular Rhythm        Mental Status:  Mental Status Findings:  Cooperative, Alert and Oriented         Anesthesia Plan  Type of Anesthesia, risks & benefits discussed:  Anesthesia Type:  general, regional  Patient's Preference: General  Intra-op  Monitoring Plan: standard ASA monitors  Intra-op Monitoring Plan Comments:   Post Op Pain Control Plan: multimodal analgesia, IV/PO Opioids PRN, per primary service following discharge from PACU and peripheral nerve block  Post Op Pain Control Plan Comments:   Induction:   IV  Beta Blocker:         Informed Consent: Patient representative understands risks and agrees with Anesthesia plan.  Questions answered. Anesthesia consent signed with patient representative.  ASA Score: 3  emergent   Day of Surgery Review of History & Physical:    H&P update referred to the surgeon.     Anesthesia Plan Notes: Phone consent discussed with lilyerArya (POA).        Ready For Surgery From Anesthesia Perspective.

## 2019-03-20 NOTE — CONSULTS
Pranay Colidnres  03/20/2019    Vascular Surgery Consult Note    CC: left leg ischemia    HPI:  Patient is a 59 y.o. male with a h/o HTN, arthritis, and cerebral palsy, tobacco abuse who presents to the ED as a transfer from [facility] with a complaint of pulseless left foot.     Patient with CP and poor historian, No family members available to provide further information at this time.  Prior history of Aorto-bifem for what sounds like aorto-iliac occlusive disease and prior left Fem-AK pop bypass by Dr. Jose Lan in [place], [place]. Progressive left leg pain for past 2-3 week. Unable to wiggle toes at baseline.     Currently, patient is unable to dorsiflex/plantar flex left ankle and sever pain with bending/straightening his knee. He is unable to feel below his knee.     Does not take any ASA/Statin or anticoagulants at home.    Per outside medical records, he had a previous left lower extremity bypass with some aortic bifemoral stent two years ago at [facility]. CTA of bilateral lower extremity shows complete thrombosis of aortic stent grafts, thrombosis of left complete femoral to distal superficial femoral stent graft.     Tobacco use: Current smoker    Past Medical History:   Diagnosis Date    Allergy     Arthritis     Hypertension     Neuropathy      Past Surgical History:   Procedure Laterality Date    ABDOMINAL SURGERY      AMPUTATION      right foot 5th digit    COLONOSCOPY  04/23/2018    COLONOSCOPY N/A 4/23/2018    [facility] by [facility]an [facility] at [facility]    ESOPHAGOGASTRODUODENOSCOPY  04/23/2018    ESOPHAGOGASTRODUODENOSCOPY (EGD) N/A 4/23/2018    Per[facility] by [facility]an [facility] at [facility] ENDO    HIP SURGERY Bilateral     x2     History reviewed. No pertinent family history.  Social History     Socioeconomic History    Marital status: Single     Spouse name: Not on file    Number of children: Not on file    Years of education: Not on file    Highest education  level: Not on file   Social Needs    Financial resource strain: Not on file    Food insecurity - worry: Not on file    Food insecurity - inability: Not on file    Transportation needs - medical: Not on file    Transportation needs - non-medical: Not on file   Occupational History    Not on file   Tobacco Use    Smoking status: Former Smoker     Types: Cigarettes     Last attempt to quit: 3/12/2019     Years since quittin.0    Smokeless tobacco: Never Used   Substance and Sexual Activity    Alcohol use: Yes     Comment: 1-2    Drug use: No    Sexual activity: Not on file   Other Topics Concern    Not on file   Social History Narrative    Not on file     Review of patient's allergies indicates:  No Known Allergies    Current Facility-Administered Medications:     heparin 25,000 units in dextrose 5% (100 units/ml) IV bolus from bag - ADDITIONAL PRN BOLUS - 30 units/kg, 30 Units/kg, Intravenous, PRN, Jamir Braun MD    heparin 25,000 units in dextrose 5% (100 units/ml) IV bolus from bag - ADDITIONAL PRN BOLUS - 60 units/kg, 60 Units/kg, Intravenous, PRN, Jamir Braun MD    heparin 25,000 units in dextrose 5% 250 mL (100 units/mL) infusion HIGH INTENSITY nomogram - [facility], 18 Units/kg/hr, Intravenous, Continuous, Jamir Braun MD    lactated ringers bolus 1,000 mL, 1,000 mL, Intravenous, Once, Jamir Braun MD    Current Outpatient Medications:     amLODIPine (NORVASC) 5 MG tablet, amlodipine 5 mg tablet, Disp: , Rfl:     gabapentin (NEURONTIN) 300 MG capsule, Take 1 capsule (300 mg total) by mouth every evening., Disp: 30 capsule, Rfl: 6    metoprolol succinate (TOPROL-XL) 50 MG 24 hr tablet, Take 50 mg by mouth once daily., Disp: , Rfl:     metoprolol tartrate (LOPRESSOR) 50 MG tablet, , Disp: , Rfl:     traMADol (ULTRAM) 50 mg tablet, Take 1 tablet (50 mg total) by mouth every 6 (six) hours as needed for Pain., Disp: 3 tablet, Rfl: 0    latanoprost 0.005 % ophthalmic solution, , Disp: , Rfl:      REVIEW OF SYSTEMS:  General: negative;   ENT: negative;   Allergy and Immunology: negative;   Hematological and Lymphatic: Negative;   Endocrine: negative;   Respiratory: no cough, shortness of breath, or wheezing;   Cardiovascular: no chest pain or dyspnea on exertion;   Gastrointestinal: no abdominal pain/back, change in bowel habits, or bloody stools;   Genito-Urinary: no dysuria, trouble voiding, or hematuria;   Musculoskeletal: left leg pain  Neurological: no TIA or stroke symptoms;   Psychiatric: no nervousness, anxiety or depression.    PHYSICAL EXAM:      Pulse: 67  Temp: 98.8 °F (37.1 °C)      General appearance:  Alert, well-appearing, and in no distress.  Oriented to person, place, and time   Neurological:  Normal speech, no focal findings noted; CN II - XII grossly intact           Musculoskeletal: Digits/nail without cyanosis/clubbing.  Normal muscle strength/tone.                 Neck: Supple, no significant adenopathy; thyroid is not enlarged                Chest:  Clear to auscultation, no wheezes, rales or rhonchi, symmetric air entry      No use of accessory muscles             Cardiac: Normal rate and regular rhythm, S1 and S2 normal; PMI non-displaced          Abdomen: Soft, nontender, nondistended, no masses or organomegaly      No rebound tenderness noted; bowel sounds normal      Extremities:   Left brachial 2+       Non-palpable femoral pulses bilaterally      Right fem monophasic      Left fem no signal      Left leg cold to touch from knee down to toes with mild contracture of knee      Left leg without motor/sensory below the knee      Severe tenderness to calf and pain to calf with passive dorsiflexion      No cap refill of left toes          LAB RESULTS:  Lab Results   Component Value Date    K 4.2 03/20/2019    K 3.5 03/19/2019    CREATININE 0.8 03/20/2019    CREATININE 0.9 03/19/2019     Lab Results   Component Value Date    WBC 11.83 03/20/2019    WBC 13.62 (H) 03/19/2019    HCT  37.5 (L) 03/20/2019    HCT 39.9 (L) 03/19/2019     (H) 03/20/2019     (H) 03/19/2019     No results found for: HGBA1C    IMAGING:  Left leg arterial duplex 03/19/19 ([facility])  - no flow left CFA/SFA/PFA/pop/tibials     CTA aorta with runoff   - reviewed, not official report  - prior end-to-side aorta-bifemoral bypass with likely chronic occlusion  - native right IIA patent but with significant stenoses  - right PFA reconstitution via lateral/medial circumflex  - left medial circumflex with some flow, occluded prior fem-AK pop prosthetic bypass, some contrast in PFA distal branches and some reconstitution of geniculates but no flow beyond       IMP/PLAN:  59 y.o. male with HTN, arthritis, and cerebral palsy, tobacco abuse, PVD with complex revascularization in past who presents to the ED as a transfer from [facility] with a complaint of pulseless left foot.     - Prior end-to-side aorto-bifem likely chronic occlusion   - pelvic collaterals via right IIA which then ultimately collateralizes left PFA, though diminutive  - chronic ischemia of left leg with subacute critical ischemia of left leg >2 weeks. Clinically unsalvageable left leg. Will likely need high left AKA or hip disarticulation. However, he may benefit from endovascular intervention of his right SHAWN/IIA to maximize collaterals.   - Patient wishes not to make any clinical decisions until his brother returns from his  tomorrow. Also, patient wants second opinion from Dr. Jose Lan in [place], [place].     - For now, will admit to floor with hydration, heparin gtt, and analgesia.     Plan discussed with Dr. Moreno.    Jamir Braun MD  Vascular/Endovascular Surgery Fellow    Vascular Attending  Agree with above      Vascular Surgery Staff  59 y.o. male with h/o cerebral palsy though articulates - previous end-to-side aorto-bifemoral bypass L fem-pop bypass  - all at outside facility in [place]. Transferred for ischemic left leg 3/19/19 thru   KELL Moreno; I'm being asked this afternoon to see him as he has a non-salvageable L leg and our team has gotten consent from his family for L AKA.  L aorto-femoral limb is occluded   He is in agonizing pain throughout the L lower leg from ischemia  Reportedly, he has not been able to walk on it in 3-4 weeks. AFB graft occlusion documented on outside US 1 week ago. He states he can not move or feel his left foot.    No doppler signals in left foot. Nonpalpable femoral pulses bilaterally.  Left foot is fixed in rigor, calf is tender, weak extension of the knee.     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  Leg is not salvageable.  In fact, given occluded L aorto-femoral limb he may not heal L AKA and, if he does not, would need L hip disarticulation in future.  Multiple discussion with Mr Colindres's family (brother and sister; Arya (brother) [phone]) and patient regarding acute on chronic left leg ischemia and need for emergent L above knee amputation - now for pain control.    Eligio Olmos MD FACS

## 2019-03-20 NOTE — ED TRIAGE NOTES
Pranay Colindres, a 59 y.o. male presents to the ED transported by EMS from [place] for vascular surgery for pulseless left leg. W/ complaint of pain the left heel and foot. Pt rated pain as 8 on 0-10 scale.     Triage note:  Chief Complaint   Patient presents with    Cold Extremeties     Pt transported by [place] [facility] for vascular surgery for pulsesless left leg. Leg cold to touch. Pt on heparin at 18 units/kg/hr     Review of patient's allergies indicates:  No Known Allergies  Past Medical History:   Diagnosis Date    Allergy     Arthritis     Hypertension     Neuropathy

## 2019-03-20 NOTE — BRIEF OP NOTE
Brief Operative Note  Date: 03/20/2019    Pre-op Diagnosis:  Critical lower limb ischemia [I99.8]; [place] acute limb ischemia class III - non-salvageable    Post-op Diagnosis:  Same    Procedure(s):  L AKA (emergent)    Surgeon: Eligio Olmos MD FACS    Assistant: BARBARA Pete MD    Anesthesia: General/MAC    Findings/Key Components:  No cautery was used for L AKA- all muscle viable, fasciculated   Only small L PFA collateral x1 noted - ligated    EBL: < 20 ml      Specimens (From admission, onward)    None        I attest to being present for the procedure and performing the case.  Eligio Olmos MD FACS

## 2019-03-20 NOTE — ED NOTES
Spoke with patient sister - Kami Martin - about her desire to have patient transferred to [place], where she lives. Sister states that patient lives by himself in [place], and she would like to have him closer to her in [place] so she can provide better care. Vascular surgery paged. Awaiting return call.    Patient sister number:

## 2019-03-20 NOTE — ED NOTES
Patient is moved from results while awaiting ultrasound studies to ER exam rm #10 to facilitate a doppler.

## 2019-03-20 NOTE — ED NOTES
Renee at transfer center is reporting pt is accepted to [facility] for ed to ed transfer.  Call report to [phone]

## 2019-03-20 NOTE — ED NOTES
Pt. Resting in bed in NAD. RR e/u. Continuous cardiac, BP, and O2 monitoring in progress. VS being monitoring continuously per MD orders. Pt. Offered bathroom assistance and denies need at this time. Explanation of care/wait provided. Bed in low, locked position with rails up and call bell in reach. Will continue to monitor.     Patient provided with additional blanket

## 2019-03-20 NOTE — ED PROVIDER NOTES
Encounter Date: 3/19/2019    SCRIBE #1 NOTE: I, Thelma Brito, am scribing for, and in the presence of,  Pato Zaidi MD. I have scribed the entire note.       History     Chief Complaint   Patient presents with    Cold Extremeties     Pt transported by [facility] for vascular surgery for pulsesless right leg. Leg cold to touch. Pt on heparin at 18 units/kg/hr     Time patient was seen by the provider: 12:50 AM      The patient is a 59 y.o. male with co-morbidities including: HTN, arthritis, and cerebral palsy, who presents to the ED as a transfer from [facility] with a complaint of pulseless left foot. Patient states that his left leg pain has been ongoing for 2-3 week. He states it has been getting worse and worsens at night. On evaluation, patient states he feels at baseline and is in no pain. He had a recent trip to [place] and is a smoker. Per outside medical records, he had a previous left lower extremity bypass with some aortic bifemoral stent two years ago at [facility]. CTA of bilateral lower extremity shows complete thrombosis of aortic stent grafts, thrombosis of left complete femoral to distal superficial femoral stent graft. Labs show normal creatinine, electrolytes, INR, PTC, and CBC. Patient is on heparin at 10 units/kg/hr.       The history is provided by the patient and medical records.     Review of patient's allergies indicates:  No Known Allergies  Past Medical History:   Diagnosis Date    Allergy     Arthritis     Hypertension     Neuropathy      Past Surgical History:   Procedure Laterality Date    ABDOMINAL SURGERY      AMPUTATION      right foot 5th digit    COLONOSCOPY  04/23/2018    COLONOSCOPY N/A 4/23/2018    [facility] ENDO    ESOPHAGOGASTRODUODENOSCOPY  04/23/2018    ESOPHAGOGASTRODUODENOSCOPY (EGD) N/A 4/23/2018    [facility] by [facility] at [facility] ENDO    HIP SURGERY Bilateral     x2     No family history on  file.  Social History     Tobacco Use    Smoking status: Former Smoker     Types: Cigarettes     Last attempt to quit: 3/12/2019     Years since quittin.0    Smokeless tobacco: Never Used   Substance Use Topics    Alcohol use: Yes     Comment: 1-2    Drug use: No     Review of Systems  General: No fever.  No chills.  Eyes: No visual changes.  Head: No headache.    Integument: No rashes or lesions.  Chest: No shortness of breath.  Cardiovascular: No chest pain.  Abdomen: No abdominal pain.  No nausea or vomiting.  Urinary: No abnormal urination.  Neurologic: No focal weakness.  No numbness.  Hematologic: No easy bruising.  Endocrine: No excessive thirst or urination.    Physical Exam     Initial Vitals [19 0039]   BP Pulse Resp Temp SpO2   (!) 149/88 82 16 98.8 °F (37.1 °C) 100 %      MAP       --         Physical Exam  Appearance: No acute distress.  Skin: No rashes seen.  Good turgor.  No abrasions.  No ecchymoses.  Eyes: No conjunctival injection.  ENT: Oropharynx clear.    Chest: Clear to auscultation bilaterally.  Good air movement.  No wheezes.  No rhonchi. Cold left lower extremity below the knee, dusky color compared to right, with no palpable pulse.   Cardiovascular: Regular rate and rhythm.  No murmurs. No gallops. No rubs.   Abdomen: Soft.  Not distended.  Nontender.  No guarding.  No rebound. No Masses  Musculoskeletal: Good range of motion all joints.  No deformities.  Neck supple.  No meningismus.  Neurologic: Equal strength in upper and lower extremities bilaterally.  Normal sensation.  No facial droop.  Normal speech.    Mental Status:  Alert and oriented x 3.  Appropriate, conversant.    ED Course   Procedures  Labs Reviewed - No data to display       Imaging Results    None          Medical Decision Making:   History:   Old Medical Records: I decided to obtain old medical records.  Initial Assessment:   Patient transferred here for ischemic limb. Patient complains of mild pain on  arrival but otherwise comfortable. Vascular surgery was consulted upon arrival. Repeat PTT and basic labs were ordered with elevation in PTT noted. Vascular surgery admitted patient. No further complication currently. Patient admitted to vascular surgery.   Clinical Tests:   Lab Tests: Ordered and Reviewed  Other:   I have discussed this case with another health care provider.       <> Summary of the Discussion: Vascular surgery            Scribe Attestation:   Scribe #1: I performed the above scribed service and the documentation accurately describes the services I performed. I attest to the accuracy of the note.               Clinical Impression:       ICD-10-CM ICD-9-CM   1. Critical lower limb ischemia I99.8 459.9         Disposition:   Disposition: Admitted  Condition: Stable                        Pato Zaidi MD  03/20/19 0645       Pato Zaidi MD  03/20/19 0646

## 2019-03-20 NOTE — TRANSFER OF CARE
Anesthesia Transfer of Care Note    Patient: Pranay Colindres    Procedure(s) Performed: Procedure(s) (LRB):  AMPUTATION, ABOVE KNEE (Left)    Patient location: PACU    Anesthesia Type: general    Transport from OR: Transported from OR on room air with adequate spontaneous ventilation    Post pain: adequate analgesia    Post assessment: no apparent anesthetic complications and tolerated procedure well    Post vital signs: stable    Level of consciousness: awake, alert and oriented    Nausea/Vomiting: no nausea/vomiting    Complications: none    Transfer of care protocol was followed      Last vitals:   Visit Vitals  /60   Pulse 80   Temp 37.1 °C (98.8 °F) (Oral)   Resp (!) 24   Wt 65.8 kg (145 lb 1 oz)   SpO2 99%   BMI 22.06 kg/m²

## 2019-03-20 NOTE — ED NOTES
Pt resting comfortably and independently repositioned in stretcher with bed locked in lowest position for safety. NAD noted at this time. Pt c/o left heel pain. Respirations even and unlabored and visible chest rise noted.  Patient offered bathroom assistance and denies need at this time. Pt instructed to call if assistance is needed. Pt on continuous cardiac, BP, and O2 monitoring. Call light within reach. Family at bedside. No needs at this time. Will continue to monitor.

## 2019-03-20 NOTE — ED NOTES
Medical records not received from [facility], fax confirmation received, Marco damian supervisor contacted for assistance. LISSETT Baez RN

## 2019-03-20 NOTE — ED NOTES
Physician at bedside.  Ultrasound tech continues blood flow studies. Patient relays severe pain at this time. Med ordered/given as directed.

## 2019-03-20 NOTE — OP NOTE
Date: 03/20/2019     Pre-op Diagnosis:  Critical lower limb ischemia [I99.8]; [place] acute limb ischemia class III - non-salvageable     Post-op Diagnosis:  Same     Procedure(s):  L AKA (emergent)     Surgeon: Eligio Olmos MD [facility]     Assistant: BARBARA Pete MD     Anesthesia: General/MAC     Findings/Key Components:  No cautery was used for L AKA- all muscle viable, fasciculated   Only small L PFA collateral x1 noted - ligated    PROCEDURE IN DETAIL: After informed consent was obtained, the patient was brought to the Operative Theater and placed in in the supine position. After adequate anesthesia the patient was prepped and draped in the usual sterile fashion. A timeout procedure was performed and a fishmouth incision was made circumferentially around the left leg, midway between the knee and hip.  The skin, fat, and muscle was divided sharply.  We were careful to not use cautery.  All muscle was viable. The femoral artery, vein, graft, and a small profunda collateral were all ligated with sutures and ties as needed.  The femur was divided with Gigli saw.  Excellent hemostasis was achieved. The wound was closed in layers with absorbable sutures and staples on the skin. A sterile dressing was applied and the leg was wrapped.  All needle, sponge, and instrument counts were correct. Dr Olmos was present for the entire case. The patient tolerated the procedure well and was transported to the recovery room in stable condition.

## 2019-03-20 NOTE — ANESTHESIA PROCEDURE NOTES
Femoral Block    Patient location during procedure: OR   Block not for primary anesthetic.  Reason for block: at surgeon's request and post-op pain management   Post-op Pain Location: Left leg pain  Start time: 3/20/2019 5:11 PM  Timeout: 3/20/2019 5:09 PM   End time: 3/20/2019 5:14 PM  Staffing  Anesthesiologist: Peter Starks MD  Performed: anesthesiologist   Preanesthetic Checklist  Completed: patient identified, site marked, surgical consent, pre-op evaluation, timeout performed, IV checked, risks and benefits discussed and monitors and equipment checked  Peripheral Block  Patient position: supine  Prep: ChloraPrep  Patient monitoring: heart rate, cardiac monitor, continuous pulse ox, continuous capnometry and frequent blood pressure checks  Block type: femoral  Laterality: left  Injection technique: single shot  Needle  Needle type: Stimuplex   Needle gauge: 21 G  Needle length: 4 in  Needle localization: anatomical landmarks and ultrasound guidance  Needle insertion depth: 1.5 cm   -ultrasound image captured on disc.  Assessment  Injection assessment: negative aspiration and local visualized surrounding nerve  Heart rate change: no  Slow fractionated injection: yes

## 2019-03-20 NOTE — ED NOTES
Pt resting comfortably and independently repositioned in stretcher with bed locked in lowest position for safety. NAD noted at this time. Respirations even and unlabored and visible chest rise noted. Pt instructed to call if assistance is needed. Pt on continuous cardiac, BP, and O2 monitoring. Call light within reach. No needs at this time. Will continue to monitor.

## 2019-03-20 NOTE — ED PROVIDER NOTES
Encounter Date: 3/19/2019       History     Chief Complaint   Patient presents with    Foot Pain     R FOOT PAIN, UNDER CARE OF PODIATRIST, SEEN HERE AND [facility] FOR SAME     Pranay Colindres is a 59y.o male with PMHx including arthritis, hypertension and cerebral palsy. He presents to ED with pain to right foot x 3 weeks.  He denies pain or trauma  He was seen by Dr. Lee last week. Xrays were normal. Patient started on Neurontin for nerve pain and instructed to return in 1 week to discuss efficacy of treatment.  Patient started with left foot pain 3/14. Patient was seen at [facility] on Thursday for left foot pain  Patient reports CT scan and ultrasound performed. DX with PVD  Patient presents today with worsen left foot pain  Left foot cold to touch. Faint pedal pulse detected with doppler. Foot pale in color. +sensation          Review of patient's allergies indicates:  No Known Allergies  Past Medical History:   Diagnosis Date    Allergy     Arthritis     Hypertension     Neuropathy      Past Surgical History:   Procedure Laterality Date    ABDOMINAL SURGERY      AMPUTATION      right foot 5th digit    COLONOSCOPY  2018    COLONOSCOPY N/A 2018    [facility] at [facility]    ESOPHAGOGASTRODUODENOSCOPY  2018    ESOPHAGOGASTRODUODENOSCOPY (EGD) N/A 2018    Performed by [facility] at [facility]    HIP SURGERY Bilateral     x2     No family history on file.  Social History     Tobacco Use    Smoking status: Former Smoker     Types: Cigarettes     Last attempt to quit: 3/12/2019     Years since quittin.0    Smokeless tobacco: Never Used   Substance Use Topics    Alcohol use: Yes     Comment: 1-2    Drug use: No     Review of Systems   Constitutional: Negative for diaphoresis, fatigue and fever.   HENT: Negative for congestion and sore throat.    Eyes: Negative for visual disturbance.   Respiratory: Negative for cough, chest  tightness, shortness of breath, wheezing and stridor.    Cardiovascular: Negative for chest pain, palpitations and leg swelling.   Gastrointestinal: Negative for abdominal distention, abdominal pain and nausea.   Endocrine: Negative for cold intolerance and heat intolerance.   Genitourinary: Negative for dysuria.   Musculoskeletal: Positive for arthralgias (bilat foot pain. Left worse than right). Negative for back pain.   Skin: Positive for color change and pallor. Negative for rash.   Neurological: Positive for numbness. Negative for weakness.   Hematological: Does not bruise/bleed easily.   Psychiatric/Behavioral: Negative for agitation, behavioral problems and confusion.   All other systems reviewed and are negative.      Physical Exam     Initial Vitals [03/19/19 1821]   BP Pulse Resp Temp SpO2   (!) 167/120 85 18 97.4 °F (36.3 °C) 96 %      MAP       --         Physical Exam    Constitutional: He appears well-developed and well-nourished. He is not diaphoretic. He appears distressed.   HENT:   Head: Normocephalic and atraumatic.   Eyes: EOM are normal.   Neck: Neck supple. No thyromegaly present. No tracheal deviation present.   Cardiovascular: Normal rate, normal heart sounds and intact distal pulses.   No murmur heard.  Pulmonary/Chest: Breath sounds normal. He has no wheezes.   Abdominal: Soft. Bowel sounds are normal. There is no tenderness.   Musculoskeletal: He exhibits tenderness. He exhibits no edema.   Decreased range of motion of left lower extremity and ankle and toes secondary to pain and numbness.   Neurological: He is alert and oriented to person, place, and time. A sensory deficit is present. GCS score is 15. GCS eye subscore is 4. GCS verbal subscore is 5. GCS motor subscore is 6.   Numbness to left foot toes.   Skin: Skin is dry. Capillary refill takes more than 3 seconds. Abscess noted. No rash noted. There is pallor.   Skin was cold to the touch from mid leg down on left lower extremity.  No  palpable pulses to dorsalis pedis or posterior tibial arteries.   Psychiatric: He has a normal mood and affect. His behavior is normal. Judgment and thought content normal.         ED Course   Procedures  Labs Reviewed   LACTIC ACID, PLASMA   PROTIME-INR   APTT   COMPREHENSIVE METABOLIC PANEL   CBC W/ AUTO DIFFERENTIAL       patient continued to have ischemic pain to his left lower extremity while in the ER.  Dilaudid was administered on 3 different occasions with some relief.  Heparin drip was started after discussing with transfer physician.  Patient was accepted for transfer for vascular surgery evaluation at [facility].    Imaging Results    None         ultrasound of lower extremity revealed severe reduction in the left iliac artery blood flow.  There was no adequate distal blood flow.  There was severe reduction in right common femoral artery flow.  There was minimal collateral circulation but this was insufficient to supply lower extremity with adequate blood supply.  Medical Decision Making:   Initial Assessment:   Claudication, peripheral vascular disease  Differential Diagnosis:   Vascular occlusion.  Clinical Tests:   Lab Tests: Ordered and Reviewed  The following lab test(s) were unremarkable: CBC, CMP and Lactate                   ED Course as of Mar 19 2104   Tue Mar 19, 2019   2000 Still awaiting information from [facility]  [JL]   2055 2030  Exam findings discussed with Dr. Alexander.  US and labs ordered. Still awaiting medical records from [facility].    [JL]   2058 Report/care resumed by Dr. Alexander  [JL]      ED Course User Index  [JL] Denise Cook NP     Clinical Impression:       ICD-10-CM ICD-9-CM   1. Arterial occlusion, lower extremity I70.209 444.22   2. Claudication I73.9 443.9         Disposition:   Disposition: Discharged  Condition: Stable                        Morteza Alexander MD  03/19/19 9945

## 2019-03-21 PROBLEM — Z74.09 IMPAIRED MOBILITY AND ACTIVITIES OF DAILY LIVING: Status: ACTIVE | Noted: 2019-03-21

## 2019-03-21 PROBLEM — Z78.9 IMPAIRED MOBILITY AND ACTIVITIES OF DAILY LIVING: Status: ACTIVE | Noted: 2019-03-21

## 2019-03-21 LAB
ANION GAP SERPL CALC-SCNC: 8 MMOL/L
BASOPHILS # BLD AUTO: 0.03 K/UL
BASOPHILS NFR BLD: 0.2 %
BUN SERPL-MCNC: 8 MG/DL
CALCIUM SERPL-MCNC: 8.8 MG/DL
CHLORIDE SERPL-SCNC: 103 MMOL/L
CO2 SERPL-SCNC: 26 MMOL/L
CREAT SERPL-MCNC: 0.7 MG/DL
DIFFERENTIAL METHOD: ABNORMAL
EOSINOPHIL # BLD AUTO: 0 K/UL
EOSINOPHIL NFR BLD: 0.1 %
ERYTHROCYTE [DISTWIDTH] IN BLOOD BY AUTOMATED COUNT: 13.5 %
EST. GFR  (AFRICAN AMERICAN): >60 ML/MIN/1.73 M^2
EST. GFR  (NON AFRICAN AMERICAN): >60 ML/MIN/1.73 M^2
GLUCOSE SERPL-MCNC: 113 MG/DL
HCT VFR BLD AUTO: 34.4 %
HGB BLD-MCNC: 12.3 G/DL
IMM GRANULOCYTES # BLD AUTO: 0.07 K/UL
IMM GRANULOCYTES NFR BLD AUTO: 0.5 %
LYMPHOCYTES # BLD AUTO: 2 K/UL
LYMPHOCYTES NFR BLD: 14.1 %
MCH RBC QN AUTO: 27.9 PG
MCHC RBC AUTO-ENTMCNC: 35.8 G/DL
MCV RBC AUTO: 78 FL
MONOCYTES # BLD AUTO: 1 K/UL
MONOCYTES NFR BLD: 7.1 %
NEUTROPHILS # BLD AUTO: 11.3 K/UL
NEUTROPHILS NFR BLD: 78 %
NRBC BLD-RTO: 0 /100 WBC
PLATELET # BLD AUTO: 338 K/UL
PMV BLD AUTO: 11 FL
POTASSIUM SERPL-SCNC: 3.6 MMOL/L
RBC # BLD AUTO: 4.41 M/UL
SODIUM SERPL-SCNC: 137 MMOL/L
WBC # BLD AUTO: 14.46 K/UL

## 2019-03-21 PROCEDURE — 85025 COMPLETE CBC W/AUTO DIFF WBC: CPT

## 2019-03-21 PROCEDURE — 99222 1ST HOSP IP/OBS MODERATE 55: CPT | Mod: ,,, | Performed by: NURSE PRACTITIONER

## 2019-03-21 PROCEDURE — 63600175 PHARM REV CODE 636 W HCPCS: Performed by: STUDENT IN AN ORGANIZED HEALTH CARE EDUCATION/TRAINING PROGRAM

## 2019-03-21 PROCEDURE — 99222 PR INITIAL HOSPITAL CARE,LEVL II: ICD-10-PCS | Mod: ,,, | Performed by: NURSE PRACTITIONER

## 2019-03-21 PROCEDURE — S5010 5% DEXTROSE AND 0.45% SALINE: HCPCS | Performed by: SURGERY

## 2019-03-21 PROCEDURE — 80048 BASIC METABOLIC PNL TOTAL CA: CPT

## 2019-03-21 PROCEDURE — 12000002 HC ACUTE/MED SURGE SEMI-PRIVATE ROOM

## 2019-03-21 PROCEDURE — 25000003 PHARM REV CODE 250: Performed by: STUDENT IN AN ORGANIZED HEALTH CARE EDUCATION/TRAINING PROGRAM

## 2019-03-21 PROCEDURE — 63600175 PHARM REV CODE 636 W HCPCS: Performed by: SURGERY

## 2019-03-21 PROCEDURE — 25000003 PHARM REV CODE 250: Performed by: SURGERY

## 2019-03-21 PROCEDURE — 36415 COLL VENOUS BLD VENIPUNCTURE: CPT

## 2019-03-21 RX ORDER — RAMELTEON 8 MG/1
8 TABLET ORAL NIGHTLY PRN
Status: DISCONTINUED | OUTPATIENT
Start: 2019-03-22 | End: 2019-03-28 | Stop reason: HOSPADM

## 2019-03-21 RX ORDER — LABETALOL HCL 20 MG/4 ML
10 SYRINGE (ML) INTRAVENOUS EVERY 4 HOURS PRN
Status: DISCONTINUED | OUTPATIENT
Start: 2019-03-21 | End: 2019-03-27

## 2019-03-21 RX ORDER — HEPARIN SODIUM 5000 [USP'U]/ML
5000 INJECTION, SOLUTION INTRAVENOUS; SUBCUTANEOUS EVERY 8 HOURS
Status: DISCONTINUED | OUTPATIENT
Start: 2019-03-21 | End: 2019-03-25

## 2019-03-21 RX ADMIN — CEFAZOLIN 2 G: 330 INJECTION, POWDER, FOR SOLUTION INTRAMUSCULAR; INTRAVENOUS at 12:03

## 2019-03-21 RX ADMIN — METOPROLOL SUCCINATE 50 MG: 50 TABLET, EXTENDED RELEASE ORAL at 08:03

## 2019-03-21 RX ADMIN — CEFAZOLIN 2 G: 330 INJECTION, POWDER, FOR SOLUTION INTRAMUSCULAR; INTRAVENOUS at 08:03

## 2019-03-21 RX ADMIN — Medication: at 09:03

## 2019-03-21 RX ADMIN — LABETALOL HCL IV SOLN PREFILLED SYRINGE 20 MG/4ML (5 MG/ML) 10 MG: 20/4 SOLUTION PREFILLED SYRINGE at 11:03

## 2019-03-21 RX ADMIN — AMLODIPINE BESYLATE 5 MG: 5 TABLET ORAL at 08:03

## 2019-03-21 RX ADMIN — RAMELTEON 8 MG: 8 TABLET, FILM COATED ORAL at 11:03

## 2019-03-21 RX ADMIN — HEPARIN SODIUM 5000 UNITS: 5000 INJECTION, SOLUTION INTRAVENOUS; SUBCUTANEOUS at 02:03

## 2019-03-21 RX ADMIN — DEXTROSE AND SODIUM CHLORIDE: 5; .45 INJECTION, SOLUTION INTRAVENOUS at 04:03

## 2019-03-21 RX ADMIN — CEFAZOLIN 2 G: 330 INJECTION, POWDER, FOR SOLUTION INTRAMUSCULAR; INTRAVENOUS at 05:03

## 2019-03-21 RX ADMIN — ASPIRIN 81 MG: 81 TABLET, COATED ORAL at 08:03

## 2019-03-21 RX ADMIN — HEPARIN SODIUM 5000 UNITS: 5000 INJECTION, SOLUTION INTRAVENOUS; SUBCUTANEOUS at 11:03

## 2019-03-21 RX ADMIN — LABETALOL HCL IV SOLN PREFILLED SYRINGE 20 MG/4ML (5 MG/ML) 10 MG: 20/4 SOLUTION PREFILLED SYRINGE at 06:03

## 2019-03-21 RX ADMIN — Medication: at 06:03

## 2019-03-21 NOTE — PLAN OF CARE
SW following for d/c needs. Pt may require SNF placement.         03/21/19 1207   Post-Acute Status   Post-Acute Authorization Placement  (Awaiting PT/OT recs.  Pt daughter requesting placement in the [place] area.)       Nimisha Devi, MSW, [facility]  [facility]  [phone]

## 2019-03-21 NOTE — SUBJECTIVE & OBJECTIVE
Past Medical History:   Diagnosis Date    Allergy     Arthritis     Hypertension     Neuropathy      Past Surgical History:   Procedure Laterality Date    ABDOMINAL SURGERY      AMPUTATION      right foot 5th digit    COLONOSCOPY  2018    COLONOSCOPY N/A 2018    [facility]    ESOPHAGOGASTRODUODENOSCOPY  2018    ESOPHAGOGASTRODUODENOSCOPY (EGD) N/A 2018    Performed by [facility] at [facility]    HIP SURGERY Bilateral     x2     Review of patient's allergies indicates:  No Known Allergies    Scheduled Medications:    amLODIPine  5 mg Oral Daily    aspirin  81 mg Oral Daily    ceFAZolin (ANCEF) IVPB  2 g Intravenous Q8H    heparin (porcine)  5,000 Units Subcutaneous Q8H    metoprolol succinate  50 mg Oral Daily       PRN Medications: HYDROcodone-acetaminophen, HYDROmorphone, naloxone, sodium chloride 0.9%    Family History     None        Tobacco Use    Smoking status: Former Smoker     Types: Cigarettes     Last attempt to quit: 3/12/2019     Years since quittin.0    Smokeless tobacco: Never Used   Substance and Sexual Activity    Alcohol use: Yes     Comment: 1-2    Drug use: No    Sexual activity: Not on file     Review of Systems   Constitutional: Negative for fatigue and fever.   HENT: Negative for trouble swallowing and voice change.    Respiratory: Negative for cough and shortness of breath.    Cardiovascular: Negative for chest pain and leg swelling.   Gastrointestinal: Negative for abdominal distention and abdominal pain.   Genitourinary: Negative for difficulty urinating and flank pain.   Musculoskeletal: Positive for gait problem. Negative for back pain.   Skin: Positive for wound (s/p L AKA). Negative for color change and rash.   Neurological: Positive for weakness and numbness. Negative for speech difficulty.   Psychiatric/Behavioral: Negative for agitation, behavioral problems and confusion.      Objective:     Vital Signs (Most Recent):  Temp: 98.3 °F (36.8 °C) (03/21/19 1141)  Pulse: 105 (03/21/19 1141)  Resp: 20 (03/21/19 1141)  BP: (!) 134/91 (03/21/19 1141)  SpO2: 95 % (03/21/19 1141)    Vital Signs (24h Range):  Temp:  [97.8 °F (36.6 °C)-98.6 °F (37 °C)] 98.3 °F (36.8 °C)  Pulse:  [] 105  Resp:  [10-30] 20  SpO2:  [95 %-99 %] 95 %  BP: (126-172)/() 134/91     Body mass index is 22.72 kg/m².    Physical Exam   Constitutional: He is oriented to person, place, and time. He appears well-developed and well-nourished.   HENT:   Head: Normocephalic and atraumatic.   Eyes: EOM are normal. Pupils are equal, round, and reactive to light. Right eye exhibits no discharge. Left eye exhibits no discharge.   Neck: Neck supple.   Pulmonary/Chest: Effort normal. No respiratory distress.   Musculoskeletal: He exhibits no edema or deformity.   RUE: 5/5.  LUE: 5/5.  RLE: 3/5.  LLE: 3/5 s/p L AKA.   Neurological: He is alert and oriented to person, place, and time. No sensory deficit. He exhibits normal muscle tone.   Skin: Skin is warm and dry.   Psychiatric: He has a normal mood and affect. His behavior is normal.   Nursing note and vitals reviewed.    NEUROLOGICAL EXAMINATION:     MENTAL STATUS   Oriented to person, place, and time.     CRANIAL NERVES     CN III, IV, VI   Pupils are equal, round, and reactive to light.  Extraocular motions are normal.       Diagnostic Results: Labs: Reviewed  ECG: Reviewed  CT: Reviewed

## 2019-03-21 NOTE — ANESTHESIA POSTPROCEDURE EVALUATION
Anesthesia Post Evaluation    Patient: Pranay Colindres    Procedure(s) Performed: Procedure(s) (LRB):  AMPUTATION, ABOVE KNEE (Left)    Final Anesthesia Type: general  Patient location during evaluation: PACU  Patient participation: Yes- Able to Participate  Level of consciousness: awake and alert  Post-procedure vital signs: reviewed and stable  Pain management: adequate  Airway patency: patent  PONV status at discharge: No PONV  Anesthetic complications: no      Cardiovascular status: blood pressure returned to baseline  Respiratory status: unassisted  Hydration status: euvolemic  Follow-up not needed.        Visit Vitals  BP (!) 172/94   Pulse 60   Temp 37 °C (98.6 °F) (Oral)   Resp 15   Wt 65.8 kg (145 lb 1 oz)   SpO2 97%   BMI 22.06 kg/m²       Pain/Yue Score: Pain Rating Prior to Med Admin: 0 (3/20/2019  6:49 PM)  Yue Score: 10 (3/20/2019  7:00 PM)

## 2019-03-21 NOTE — SUBJECTIVE & OBJECTIVE
Medications:  Continuous Infusions:   sodium chloride 0.9%      dextrose 5 % and 0.45 % NaCl 100 mL/hr at 03/21/19 0451    hydromorphone in 0.9 % NaCl 6 mg/30 ml       Scheduled Meds:   amLODIPine  5 mg Oral Daily    aspirin  81 mg Oral Daily    ceFAZolin (ANCEF) IVPB  2 g Intravenous Q8H    metoprolol succinate  50 mg Oral Daily     PRN Meds:HYDROcodone-acetaminophen, HYDROmorphone, naloxone, sodium chloride 0.9%     Objective:     Vital Signs (Most Recent):  Temp: 97.8 °F (36.6 °C) (03/21/19 0448)  Pulse: 75 (03/21/19 0448)  Resp: 16 (03/21/19 0448)  BP: (!) 168/88 (03/21/19 0448)  SpO2: 97 % (03/21/19 0448) Vital Signs (24h Range):  Temp:  [97.8 °F (36.6 °C)-98.6 °F (37 °C)] 97.8 °F (36.6 °C)  Pulse:  [] 75  Resp:  [10-30] 16  SpO2:  [95 %-99 %] 97 %  BP: (126-172)/() 168/88          Physical Exam   Constitutional: He appears well-developed and well-nourished.   HENT:   Head: Normocephalic and atraumatic.   Eyes: Conjunctivae and EOM are normal.   Neck: Normal range of motion. Neck supple.   Cardiovascular: Normal rate and regular rhythm.   Pulmonary/Chest: Effort normal. No respiratory distress.   Abdominal: Soft. He exhibits no distension.   Musculoskeletal:   L AKA dressings c/d/i       Significant Labs:  CBC:   Recent Labs   Lab 03/21/19  0556   WBC 14.46*   RBC 4.41*   HGB 12.3*   HCT 34.4*      MCV 78*   MCH 27.9   MCHC 35.8     CMP:   Recent Labs   Lab 03/20/19  0150 03/21/19  0326   GLU 94 113*   CALCIUM 9.6 8.8   ALBUMIN 3.6  --    PROT 7.2  --     137   K 4.2 3.6   CO2 25 26    103   BUN 17 8   CREATININE 0.8 0.7   ALKPHOS 115  --    ALT 62*  --    *  --    BILITOT 0.5  --        Significant Diagnostics:  I have reviewed all pertinent imaging results/findings within the past 24 hours.

## 2019-03-21 NOTE — PLAN OF CARE
Problem: Adult Inpatient Plan of Care  Goal: Plan of Care Review  Outcome: Ongoing (interventions implemented as appropriate)  Pt lying in bed comfortably, AAOx4, VSS, IV intact and infusing, no accu, NS tele, room air, no c/o of pain, progressing towards goals, fall precautions maintained with no falls noted during shift, bed in low locked position, call light within reach, ID band on, personal items in reach, will continue to monitor and follow plan of care.

## 2019-03-21 NOTE — CONSULTS
Inpatient consult to Physical Medicine Rehab  Consult performed by: Ximena Van NP  Consult ordered by: El Davis MD  Reason for consult: s/p AKA, assess rehab needs      Reviewed patient history and current admission.  Rehab team following.  Full consult to follow.    ANGEL Rico, FNP-C  Physical Medicine & Rehabilitation   03/21/2019  Spectralink: [id number]

## 2019-03-21 NOTE — PROGRESS NOTES
Telemetry tech notified that tele box attached to patient. Tele tech stated that Mr. Colindres's heart rate and rhythm are visible to her.

## 2019-03-21 NOTE — PROGRESS NOTES
[facility]  Vascular Surgery  Progress Note    Patient Name: Pranay Colindres  MRN: [id number]  Admission Date: 3/20/2019  Primary Care Provider: Jenaro Torres MD    Subjective:     Interval History: no events. Pain controlled    Post-Op Info:  Procedure(s) (LRB):  AMPUTATION, ABOVE KNEE (Left)   1 Day Post-Op       Medications:  Continuous Infusions:   sodium chloride 0.9%      dextrose 5 % and 0.45 % NaCl 100 mL/hr at 03/21/19 0451    hydromorphone in 0.9 % NaCl 6 mg/30 ml       Scheduled Meds:   amLODIPine  5 mg Oral Daily    aspirin  81 mg Oral Daily    ceFAZolin (ANCEF) IVPB  2 g Intravenous Q8H    metoprolol succinate  50 mg Oral Daily     PRN Meds:HYDROcodone-acetaminophen, HYDROmorphone, naloxone, sodium chloride 0.9%     Objective:     Vital Signs (Most Recent):  Temp: 97.8 °F (36.6 °C) (03/21/19 0448)  Pulse: 75 (03/21/19 0448)  Resp: 16 (03/21/19 0448)  BP: (!) 168/88 (03/21/19 0448)  SpO2: 97 % (03/21/19 0448) Vital Signs (24h Range):  Temp:  [97.8 °F (36.6 °C)-98.6 °F (37 °C)] 97.8 °F (36.6 °C)  Pulse:  [] 75  Resp:  [10-30] 16  SpO2:  [95 %-99 %] 97 %  BP: (126-172)/() 168/88          Physical Exam   Constitutional: He appears well-developed and well-nourished.   HENT:   Head: Normocephalic and atraumatic.   Eyes: Conjunctivae and EOM are normal.   Neck: Normal range of motion. Neck supple.   Cardiovascular: Normal rate and regular rhythm.   Pulmonary/Chest: Effort normal. No respiratory distress.   Abdominal: Soft. He exhibits no distension.   Musculoskeletal:   L AKA dressings c/d/i       Significant Labs:  CBC:   Recent Labs   Lab 03/21/19  0556   WBC 14.46*   RBC 4.41*   HGB 12.3*   HCT 34.4*      MCV 78*   MCH 27.9   MCHC 35.8     CMP:   Recent Labs   Lab 03/20/19  0150 03/21/19  0326   GLU 94 113*   CALCIUM 9.6 8.8   ALBUMIN 3.6  --    PROT 7.2  --     137   K 4.2 3.6   CO2 25 26    103   BUN 17 8   CREATININE 0.8 0.7   ALKPHOS 115  --     ALT 62*  --    *  --    BILITOT 0.5  --        Significant Diagnostics:  I have reviewed all pertinent imaging results/findings within the past 24 hours.    Assessment/Plan:     * Critical lower limb ischemia    59 y.o. male with HTN, arthritis, and cerebral palsy, tobacco abuse, PVD with complex revascularization in past who presented as a transfer from [facility] with a complaint of pulseless left foot, found to have occluded aortobifem and L fem-pop bypasses. He underwent L AKA on 3/20/19  - will remove dressing and evaluate incision on 3/22/19  - PCA for pain control  - physical therapy consult  - Hb stable at 12.3, will monitor  - remove rocah  - heparin ppx    Dispo: anticipate DC to rehab in [place], where sister lives         Isaías Ronquillo MD  Vascular Surgery  [facility]

## 2019-03-21 NOTE — PLAN OF CARE
SW received a call from pt's daughter requesting that a referral for placement be sent to [facility].  Team awaiting therapy reccommendations at this time.  SW will send a referral upon receipt of recs.              Nimisha Devi, SHAD, LCSW [facility]  [phone]

## 2019-03-21 NOTE — ASSESSMENT & PLAN NOTE
- pulseless L foot and Leg pain x 2-3 weeks.   - CTA reviewed - occlusion of aorto-bifem and bilateral distal bypasses. Leg not salvageable.  - Vasc sx proceed with L AKA on 3/20/19.

## 2019-03-21 NOTE — CONSULTS
[facility]  Physical Medicine & Rehab  Consult Note    Patient Name: Pranay Colindres  MRN: [id number]  Admission Date: 3/20/2019  Hospital Length of Stay: 1 days  Attending Physician: Thalia Moreno MD     Inpatient consult to Physical Medicine & Rehabilitation  Consult performed by: Ximena Van NP  Consult requested by:  Thalia Moreno MD    Collaborating Physician: Leidy Vilchis MD  Reason for Consult:  Assess rehabilitation needs     Consults  Subjective:     Principal Problem: Critical lower limb ischemia    HPI: Pranay oClindres is a 59-year-old male with PMHx of  HTN, arthritis, and cerebral palsy, tobacco abuse. Patient transferred to [facility] on 3/20/19 from [facility] with complaint of pulseless L foot and Leg pain x 2-3 weeks. 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 Leg not salvageable. Vasc sx proceed with L AKA on 3/20/19.     Functional History: Patient lives in [place] with brother in a single story home with 3 steps to enter.  Prior to admission, (I). DME: RW, cane.     Hospital Course: PT and OT pending     Past Medical History:   Diagnosis Date    Allergy     Arthritis     Hypertension     Neuropathy      Past Surgical History:   Procedure Laterality Date    ABDOMINAL SURGERY      AMPUTATION      right foot 5th digit    COLONOSCOPY  04/23/2018    COLONOSCOPY N/A 4/23/2018    [facility]formed by [facility]an [facility] at [facility]    ESOPHAGOGASTRODUODENOSCOPY  04/23/2018    ESOPHAGOGASTRODUODENOSCOPY (EGD) N/A 4/23/2018    Performed by [facility] at [facility]    HIP SURGERY Bilateral     x2     Review of patient's allergies indicates:  No Known Allergies    Scheduled Medications:    amLODIPine  5 mg Oral Daily    aspirin  81 mg Oral Daily    ceFAZolin (ANCEF) IVPB  2 g Intravenous Q8H    heparin (porcine)  5,000 Units Subcutaneous Q8H    metoprolol succinate  50 mg Oral Daily       PRN Medications:  HYDROcodone-acetaminophen, HYDROmorphone, naloxone, sodium chloride 0.9%    Family History     Unknown per patient        Tobacco Use    Smoking status: Former Smoker     Types: Cigarettes     Last attempt to quit: 3/12/2019     Years since quittin.0    Smokeless tobacco: Never Used   Substance and Sexual Activity    Alcohol use: Yes     Comment: 1-2    Drug use: No    Sexual activity: Not on file     Review of Systems   Constitutional: Negative for fatigue and fever.   HENT: Negative for trouble swallowing and voice change.    Respiratory: Negative for cough and shortness of breath.    Cardiovascular: Negative for chest pain and leg swelling.   Gastrointestinal: Negative for abdominal distention and abdominal pain.   Genitourinary: Negative for difficulty urinating and flank pain.   Musculoskeletal: Positive for gait problem. Negative for back pain.   Skin: Positive for wound (s/p L AKA). Negative for color change and rash.   Neurological: Positive for weakness and numbness. Negative for speech difficulty.   Psychiatric/Behavioral: Negative for agitation, behavioral problems and confusion.     Objective:     Vital Signs (Most Recent):  Temp: 98.3 °F (36.8 °C) (19 1141)  Pulse: 105 (19 1141)  Resp: 20 (19 1141)  BP: (!) 134/91 (19 1141)  SpO2: 95 % (19 1141)    Vital Signs (24h Range):  Temp:  [97.8 °F (36.6 °C)-98.6 °F (37 °C)] 98.3 °F (36.8 °C)  Pulse:  [] 105  Resp:  [10-30] 20  SpO2:  [95 %-99 %] 95 %  BP: (126-172)/() 134/91     Body mass index is 22.72 kg/m².    Physical Exam   Constitutional: He is oriented to person, place, and time. He appears well-developed and well-nourished.   HENT:   Head: Normocephalic and atraumatic.   Eyes: EOM are normal. Pupils are equal, round, and reactive to light. Right eye exhibits no discharge. Left eye exhibits no discharge.   Neck: Neck supple.   Pulmonary/Chest: Effort normal. No respiratory distress.   Musculoskeletal: He  exhibits no edema or deformity.   RUE: 5/5.  LUE: 5/5.  RLE: 3/5.  LLE: 3/5 s/p L AKA.   Neurological: He is alert and oriented to person, place, and time. No sensory deficit. He exhibits normal muscle tone.   Skin: Skin is warm and dry.   Psychiatric: He has a normal mood and affect. His behavior is normal.   Nursing note and vitals reviewed.      Diagnostic Results: Labs: Reviewed  ECG: Reviewed  CT: Reviewed    Assessment/Plan:     * Critical lower limb ischemia    - pulseless L foot and Leg pain x 2-3 weeks.   - CTA reviewed - occlusion of aorto-bifem and bilateral distal bypasses. Leg not salvageable.  - Vasc sx proceed with L AKA on 3/20/19.      Impaired mobility and activities of daily living    Recommendations  -  Early mobility, hip AROM, and OOB in chair at least 3 hours per day  -  PT/OT evaluate and treat  -  Monitor for phantom limb pain and/or sensation  -  Pain management  -  Wound care and edema control  -  Monitor for and prevent skin breakdown and pressure ulcers  · Early mobility, repositioning/weight shifting every 20-30 minutes when sitting, turn patient every 2 hours, proper mattress/overlay and chair cushioning, pressure relief/heel protector boots  -  Anticontracture management  · Firm mattress, lying prone 15 minutes TID, and knee extension while resting  -  Reviewed discharge options (IP rehab, SNF, HH therapy, and OP therapy)  -  Follow up in PM&R clinic 2-4 weeks post-op for postamputation and preprosthetic care       PT and OT evaluations pending. Will follow progress and discuss with rehab team for post acute care/rehab recommendation.    Thank you for your consult.     Ximena Van NP  Department of Physical Medicine & Rehab  [facility]

## 2019-03-21 NOTE — ASSESSMENT & PLAN NOTE
59 y.o. male with HTN, arthritis, and cerebral palsy, tobacco abuse, PVD with complex revascularization in past who presented as a transfer from [place] ED with a complaint of pulseless left foot, found to have occluded aortobifem and L fem-pop bypasses. He underwent L AKA on 3/20/19  - will remove dressing and evaluate incision on 3/22/19  - PCA for pain control  - physical therapy consult  - Hb stable at 12.3, will monitor  - remove roach    Dispo: anticipate DC to rehab in [place], where sister lives

## 2019-03-21 NOTE — ASSESSMENT & PLAN NOTE
59 y.o. male with HTN, arthritis, and cerebral palsy, tobacco abuse, PVD with complex revascularization in past who presented as a transfer from [place] [facility] with a complaint of pulseless left foot, found to have occluded aortobifem and L fem-pop bypasses. He underwent L AKA on 3/20/19. 2 Days Post-Op.    -PT/OT & PM&R  -Strict I's/O's  -Regular diet  -Home meds  -Pain control  -PPx: SCD's/IS/SubQ heparin  -Dispo: anticipate DC to rehab

## 2019-03-21 NOTE — NURSING
Pt. Arrived on the unit via stretcher from PACU, s/p left aka, stump dressing dry and intact, no drainage noted, currently on dilaudid pca, pt.   Instructed on how to use device, pt. Oriented to staff and room equipment, vital signs stable...

## 2019-03-21 NOTE — ASSESSMENT & PLAN NOTE
Recommendations  -  Early mobility, hip AROM, and OOB in chair at least 3 hours per day  -  PT/OT evaluate and treat  -  Monitor for phantom limb pain and/or sensation  -  Pain management  -  Wound care and edema control  -  Monitor for and prevent skin breakdown and pressure ulcers  · Early mobility, repositioning/weight shifting every 20-30 minutes when sitting, turn patient every 2 hours, proper mattress/overlay and chair cushioning, pressure relief/heel protector boots  -  Anticontracture management  · Firm mattress, lying prone 15 minutes TID, and knee extension while resting  -  Reviewed discharge options (IP rehab, SNF, HH therapy, and OP therapy)  -  Follow up in PM&R clinic 2-4 weeks post-op for postamputation and preprosthetic care

## 2019-03-21 NOTE — HOSPITAL COURSE
3/23/19: Participated w/ OT. Bed mobility CGA- Tae. Sit to stand Tae w/ RW. Grooming CGA.   3/24/19: participated w/ PT. Bed mobility SBA. Sit to stand Tae w/ RW. Ambulated 70 ft CGA-Tae w/ RW.

## 2019-03-21 NOTE — NURSING TRANSFER
Nursing Transfer Note      3/20/2019     Transfer To: 540    Transfer via stretcher    Transfer with cardiac monitoring, IV pump/pole    Transported by transporter    Medicines sent: [facility]    Chart send with patient: Yes    Notified: brother    Patient reassessed at: 3/20/19

## 2019-03-21 NOTE — HPI
Pranay Colindres is a 59-year-old male with PMHx of  HTN, arthritis, and cerebral palsy, tobacco abuse. Patient transferred to [facility] on 3/20/19 from [facility] with complaint of pulseless L foot and Leg pain x 2-3 weeks. 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 Leg not salvageable. Vasc sx proceed with L AKA on 3/20/19.     Functional History: Patient lives in [place] with brother in a single story home with 3 steps to enter.  Prior to admission, (I). DME: RW, cane.

## 2019-03-21 NOTE — PLAN OF CARE
Problem: Fall Injury Risk  Goal: Absence of Fall and Fall-Related Injury    Intervention: Promote Injury-Free Environment  Fall precautions reviewed and initiated with pt. , instructed on call light use, bed in low position, siderails up x3, bedalam set, personal items within reach... Pt. Verbalize understanding of information given...

## 2019-03-22 LAB
ANION GAP SERPL CALC-SCNC: 9 MMOL/L
BASOPHILS # BLD AUTO: 0.04 K/UL
BASOPHILS NFR BLD: 0.3 %
BUN SERPL-MCNC: 8 MG/DL
CALCIUM SERPL-MCNC: 9.2 MG/DL
CHLORIDE SERPL-SCNC: 98 MMOL/L
CO2 SERPL-SCNC: 27 MMOL/L
CREAT SERPL-MCNC: 0.7 MG/DL
DIFFERENTIAL METHOD: ABNORMAL
EOSINOPHIL # BLD AUTO: 0.1 K/UL
EOSINOPHIL NFR BLD: 0.7 %
ERYTHROCYTE [DISTWIDTH] IN BLOOD BY AUTOMATED COUNT: 13.2 %
EST. GFR  (AFRICAN AMERICAN): >60 ML/MIN/1.73 M^2
EST. GFR  (NON AFRICAN AMERICAN): >60 ML/MIN/1.73 M^2
GLUCOSE SERPL-MCNC: 136 MG/DL
HCT VFR BLD AUTO: 35.1 %
HGB BLD-MCNC: 12.6 G/DL
IMM GRANULOCYTES # BLD AUTO: 0.06 K/UL
IMM GRANULOCYTES NFR BLD AUTO: 0.4 %
LYMPHOCYTES # BLD AUTO: 1.8 K/UL
LYMPHOCYTES NFR BLD: 12.7 %
MCH RBC QN AUTO: 28.2 PG
MCHC RBC AUTO-ENTMCNC: 35.9 G/DL
MCV RBC AUTO: 79 FL
MONOCYTES # BLD AUTO: 1.3 K/UL
MONOCYTES NFR BLD: 9.2 %
NEUTROPHILS # BLD AUTO: 10.8 K/UL
NEUTROPHILS NFR BLD: 76.7 %
NRBC BLD-RTO: 0 /100 WBC
PLATELET # BLD AUTO: 333 K/UL
PMV BLD AUTO: 10.4 FL
POTASSIUM SERPL-SCNC: 3.3 MMOL/L
RBC # BLD AUTO: 4.47 M/UL
SODIUM SERPL-SCNC: 134 MMOL/L
WBC # BLD AUTO: 14.14 K/UL

## 2019-03-22 PROCEDURE — 97116 GAIT TRAINING THERAPY: CPT

## 2019-03-22 PROCEDURE — 36415 COLL VENOUS BLD VENIPUNCTURE: CPT

## 2019-03-22 PROCEDURE — 63600175 PHARM REV CODE 636 W HCPCS: Performed by: SURGERY

## 2019-03-22 PROCEDURE — 25000003 PHARM REV CODE 250: Performed by: STUDENT IN AN ORGANIZED HEALTH CARE EDUCATION/TRAINING PROGRAM

## 2019-03-22 PROCEDURE — 97165 OT EVAL LOW COMPLEX 30 MIN: CPT

## 2019-03-22 PROCEDURE — 97161 PT EVAL LOW COMPLEX 20 MIN: CPT

## 2019-03-22 PROCEDURE — 63600175 PHARM REV CODE 636 W HCPCS: Performed by: STUDENT IN AN ORGANIZED HEALTH CARE EDUCATION/TRAINING PROGRAM

## 2019-03-22 PROCEDURE — 85025 COMPLETE CBC W/AUTO DIFF WBC: CPT

## 2019-03-22 PROCEDURE — 97535 SELF CARE MNGMENT TRAINING: CPT

## 2019-03-22 PROCEDURE — 12000002 HC ACUTE/MED SURGE SEMI-PRIVATE ROOM

## 2019-03-22 PROCEDURE — 80048 BASIC METABOLIC PNL TOTAL CA: CPT

## 2019-03-22 RX ORDER — BISACODYL 5 MG
5 TABLET, DELAYED RELEASE (ENTERIC COATED) ORAL DAILY PRN
Status: DISCONTINUED | OUTPATIENT
Start: 2019-03-22 | End: 2019-03-28 | Stop reason: HOSPADM

## 2019-03-22 RX ORDER — HYDROCODONE BITARTRATE AND ACETAMINOPHEN 10; 325 MG/1; MG/1
1 TABLET ORAL EVERY 6 HOURS PRN
Status: DISCONTINUED | OUTPATIENT
Start: 2019-03-22 | End: 2019-03-28 | Stop reason: HOSPADM

## 2019-03-22 RX ORDER — POTASSIUM CHLORIDE 20 MEQ/1
40 TABLET, EXTENDED RELEASE ORAL EVERY 4 HOURS
Status: COMPLETED | OUTPATIENT
Start: 2019-03-22 | End: 2019-03-22

## 2019-03-22 RX ORDER — GABAPENTIN 300 MG/1
300 CAPSULE ORAL NIGHTLY PRN
Status: DISCONTINUED | OUTPATIENT
Start: 2019-03-22 | End: 2019-03-26

## 2019-03-22 RX ADMIN — HYDROCODONE BITARTRATE AND ACETAMINOPHEN 1 TABLET: 10; 325 TABLET ORAL at 01:03

## 2019-03-22 RX ADMIN — AMLODIPINE BESYLATE 5 MG: 5 TABLET ORAL at 09:03

## 2019-03-22 RX ADMIN — POTASSIUM CHLORIDE 40 MEQ: 1500 TABLET, EXTENDED RELEASE ORAL at 09:03

## 2019-03-22 RX ADMIN — HYDROMORPHONE HYDROCHLORIDE 0.5 MG: 1 INJECTION, SOLUTION INTRAMUSCULAR; INTRAVENOUS; SUBCUTANEOUS at 11:03

## 2019-03-22 RX ADMIN — GABAPENTIN 300 MG: 300 CAPSULE ORAL at 11:03

## 2019-03-22 RX ADMIN — HEPARIN SODIUM 5000 UNITS: 5000 INJECTION, SOLUTION INTRAVENOUS; SUBCUTANEOUS at 09:03

## 2019-03-22 RX ADMIN — HYDROCODONE BITARTRATE AND ACETAMINOPHEN 1 TABLET: 10; 325 TABLET ORAL at 09:03

## 2019-03-22 RX ADMIN — HEPARIN SODIUM 5000 UNITS: 5000 INJECTION, SOLUTION INTRAVENOUS; SUBCUTANEOUS at 01:03

## 2019-03-22 RX ADMIN — ASPIRIN 81 MG: 81 TABLET, COATED ORAL at 09:03

## 2019-03-22 RX ADMIN — Medication: at 08:03

## 2019-03-22 RX ADMIN — METOPROLOL SUCCINATE 50 MG: 50 TABLET, EXTENDED RELEASE ORAL at 09:03

## 2019-03-22 RX ADMIN — POTASSIUM CHLORIDE 40 MEQ: 1500 TABLET, EXTENDED RELEASE ORAL at 10:03

## 2019-03-22 RX ADMIN — RAMELTEON 8 MG: 8 TABLET, FILM COATED ORAL at 09:03

## 2019-03-22 NOTE — PLAN OF CARE
03/22/19 0900   Post-Acute Status   Post-Acute Authorization Other  (Team awaiting PT/OT recs)       Nimisha Devi, MSW, LCSW  [facility]  [id number]

## 2019-03-22 NOTE — PLAN OF CARE
Problem: Occupational Therapy Goal  Goal: Occupational Therapy Goal  Goals to be met by: 3/29/2019     Patient will increase functional independence with ADLs by performing:    UE Dressing with Supervision.  LE Dressing with Minimal Assistance.  Grooming while EOB with Stand-by Assistance.  Toileting from toilet with Minimal Assistance for hygiene and clothing management.   Toilet transfer to toilet with Stand-by Assistance.    Outcome: Ongoing (interventions implemented as appropriate)  OT eval complete. Pt tolerated session well. Pt's functional outcomes established today based on his presenting functional level.     Comments: Cont OT POC

## 2019-03-22 NOTE — PT/OT/SLP EVAL
Physical Therapy Evaluation    Patient Name:  Pranay Colindres   MRN:  [id number]    Recommendations:     Discharge Recommendations:  rehabilitation facility(Simultaneous filing. User may not have seen previous data.)   Discharge Equipment Recommendations: wheelchair, manual(removeable arm rests and elevating leg rests Simultaneous filing. User may not have seen previous data.)   Barriers to discharge: Decreased caregiver support    Assessment:     Pranay Colindres is a 59 y.o. male admitted with a medical diagnosis of Critical lower limb ischemia.  He presents with the following impairments/functional limitations:  impaired endurance, gait instability, pain, impaired joint extensibility, decreased lower extremity function, decreased ROM, orthopedic precautions, impaired self care skills, impaired functional mobilty, weakness(Simultaneous filing. User may not have seen previous data.).    -PT eval complete. Pt tolerated session well today with no complications and was able to complete all functional tasks. Pt with no LOB during ambulation and t/f's using RW for support. Pt required (A) to remain sitting upright at EOB initially but was able to correct himself with cueing from PT for (B) UE support. Pt will cont to benefit from therapy services and is appropriate for IP Rehab to maximize functional outcomes.     Rehab Prognosis: Good; patient would benefit from acute skilled PT services to address these deficits and reach maximum level of function.    Recent Surgery: Procedure(s) (LRB):  AMPUTATION, ABOVE KNEE (Left) 2 Days Post-Op    Plan:     During this hospitalization, patient to be seen 4 x/week to address the identified rehab impairments via gait training, therapeutic activities, therapeutic exercises, wheelchair management/training and progress toward the following goals:    · Plan of Care Expires:  04/22/19    Subjective     Chief Complaint: impaired mobility  Patient/Family Comments/goals: return home to  PLOF  Pain/Comfort:  · Pain Rating 1: other (see comments)(did not rate Simultaneous filing. User may not have seen previous data.)  · Location - Side 1: Left  · Location - Orientation 1: generalized  · Location 1: hip    Patients cultural, spiritual, Mormon conflicts given the current situation:      Living Environment:  -Pt lives with his brother in a H with 3 ROMAIN and 1 handrail. Pt has a walk-in shower with a shower chair. Pt's brother is unable to provide 24/7 (A) upon d/c.  Prior to admission, patients level of function was (I).  Equipment used at home: walker, rolling, cane, straight, shower chair, grab bar(Simultaneous filing. User may not have seen previous data.).  DME owned (not currently used): .  Upon discharge, patient will have assistance from undetermined.    Objective:     Communicated with nsg prior to session.  Patient found supine with (no lines Simultaneous filing. User may not have seen previous data.)  upon PT entry to room.    General Precautions: Standard, fall(Simultaneous filing. User may not have seen previous data.)   Orthopedic Precautions:LLE non weight bearing(Simultaneous filing. User may not have seen previous data.)   Braces: N/A(Simultaneous filing. User may not have seen previous data.)     Exams:  · Sensation:    · -       Intact  · RLE ROM: WFL  · RLE Strength: WFL    Functional Mobility:  · Bed Mobility:     · Scooting: minimum assistance  · Supine to Sit: moderate assistance  · Sit to Supine: minimum assistance  · Transfers:     · Sit to Stand:  minimum assistance with rolling walker  · Gait: 50' using RW with min (A). Pt took 3 standing rest breaks 2/2 fatigue but was able to complete trial without seated rest break      Therapeutic Activities and Exercises:   -Pt educated on:  A. PT POC and role of PT  B. Importance of OOB activity to improve functional outcomes   C. DME mgmt and t/f sequencing  D. Performing HEP to reduce the risk of developing blood clots  E. Gait  sequencing   F. D/c planning      AM-PAC 6 CLICK MOBILITY  Total Score:14     Patient left supine with all lines intact, call button in reach and nsg notified.    GOALS:   Multidisciplinary Problems     Physical Therapy Goals        Problem: Physical Therapy Goal    Goal Priority Disciplines Outcome Goal Variances Interventions   Physical Therapy Goal     PT, PT/OT Ongoing (interventions implemented as appropriate)     Description:  Goals to be met by: 3/29/19    Patient will increase functional independence with mobility by performin. Supine to sit with CGA  2. Sit to supine with CGA  3. Sit to stand transfer with CGA using appropriate AD  4. Bed to chair transfer with CGA using appropriate AD  5. Gait  x 100 feet with min (A) using appropriate AD.   6. Lower extremity exercise program x20-30 reps per handout, with assistance as needed.                        History:     Past Medical History:   Diagnosis Date    Allergy     Arthritis     Hypertension     Neuropathy        Past Surgical History:   Procedure Laterality Date    ABDOMINAL SURGERY      AMPUTATION      right foot 5th digit    AMPUTATION, ABOVE KNEE Left 3/20/2019    [facility]ND F[facility]    COLONOSCOPY  2018    COLONOSCOPY N/A 2018    Performed by Jeramy Castelan [facility] at [facility] ENDO    ESOPHAGOGASTRODUODENOSCOPY  2018    ESOPHAGOGASTRODUODENOSCOPY (EGD) N/A 2018    [facility] at [facility]    HIP SURGERY Bilateral     x2       Time Tracking:     PT Received On: 19  PT Start Time: 1602     PT Stop Time: 1628  PT Total Time (min): 26 min     Billable Minutes: Evaluation 16 and Gait Training 10      Pietro Dasilva, PT  2019

## 2019-03-22 NOTE — SUBJECTIVE & OBJECTIVE
Medications:  Continuous Infusions:    Scheduled Meds:   amLODIPine  5 mg Oral Daily    aspirin  81 mg Oral Daily    heparin (porcine)  5,000 Units Subcutaneous Q8H    metoprolol succinate  50 mg Oral Daily    potassium chloride  40 mEq Oral Q4H     PRN Meds:HYDROcodone-acetaminophen, HYDROcodone-acetaminophen, HYDROmorphone, labetalol, ramelteon, sodium chloride 0.9%     Objective:     Vital Signs (Most Recent):  Temp: 98.2 °F (36.8 °C) (03/22/19 0755)  Pulse: 91 (03/22/19 0755)  Resp: 18 (03/22/19 0755)  BP: 135/86 (03/22/19 0755)  SpO2: 98 % (03/22/19 0755) Vital Signs (24h Range):  Temp:  [96.2 °F (35.7 °C)-98.3 °F (36.8 °C)] 98.2 °F (36.8 °C)  Pulse:  [] 91  Resp:  [18-20] 18  SpO2:  [95 %-98 %] 98 %  BP: (133-180)/() 135/86          Physical Exam   Constitutional: He appears well-developed and well-nourished.   HENT:   Head: Normocephalic and atraumatic.   Eyes: Conjunctivae and EOM are normal.   Neck: Normal range of motion. Neck supple.   Cardiovascular: Normal rate and regular rhythm.   Pulmonary/Chest: Effort normal. No respiratory distress.   Abdominal: Soft. He exhibits no distension.   Musculoskeletal:   L AKA incision clean/dry/intact       Significant Labs:  CBC:   Recent Labs   Lab 03/22/19  0524   WBC 14.14*   RBC 4.47*   HGB 12.6*   HCT 35.1*      MCV 79*   MCH 28.2   MCHC 35.9     CMP:   Recent Labs   Lab 03/22/19  0311   *   CALCIUM 9.2   *   K 3.3*   CO2 27   CL 98   BUN 8   CREATININE 0.7       Significant Diagnostics:  I have reviewed all pertinent imaging results/findings within the past 24 hours.

## 2019-03-22 NOTE — PT/OT/SLP EVAL
Occupational Therapy   Evaluation/ Treatment    Name: Pranay Colindres  MRN: [id number]  Admitting Diagnosis:  Critical lower limb ischemia 2 Days Post-Op    Recommendations:     Discharge Recommendations: rehabilitation facility  Discharge Equipment Recommendations:  commode  Barriers to discharge:  Inaccessible home environment    Assessment:     Pranay Colindres is a 59 y.o. male with a medical diagnosis of Critical lower limb ischemia.  He presents with the following performance deficits affecting function: weakness, impaired endurance, impaired self care skills, impaired functional mobilty, gait instability, decreased lower extremity function, orthopedic precautions, impaired balance.      OT eval complete. Pt tolerated session well. Pt in good spirits and eager to participate in therapy. He presents w/ good overall B UE ROM and strength to facilitate all functional mobility and transfers. Pt required increased assistance for self-care tasks 2/2 impaired balance sitting EOB and fear of falling w/out UE support. Pt required Mod A for bed mobility and Min A for sit<>stand transfer w/ RW. Pt able to tolerate functional ambulation ~50ft w/ RW and close CGA w/ 2 standing rest breaks. He will continue to benefit from skilled OT to address the above listed impairments.     Rehab Prognosis: Good; patient would benefit from acute skilled OT services to address these deficits and reach maximum level of function.       Plan:     Patient to be seen 4 x/week to address the above listed problems via self-care/home management, therapeutic activities, therapeutic exercises  · Plan of Care Expires: 04/21/19  · Plan of Care Reviewed with: patient    Subjective     Chief Complaint: none stated  Patient/Family Comments/goals:  To return home    Occupational Profile:  Living Environment: Pt lives with his brother in [facility] w/ 3 ROMAIN front and back w/ HR support. Pt uses a walk-in shower  Previous level of function: PTA, pt reports being  independent w/ ADLs and functional mobility   Equipment Used at Home:  cane, straight, walker, rolling, shower chair, grab bar  Assistance upon Discharge: Pt states his brother can assist when in town but otherwise he has family that live on his street. Pt states he can call his family to assist if needed.     Pain/Comfort:  · Pain Rating 1: (did not rate)  · Pain Rating Post-Intervention 1: (did not rate)    Patients cultural, spiritual, Taoist conflicts given the current situation: no    Objective:     Communicated with: RN prior to session.  Patient found HOB elevated with telemetry upon OT entry to room.    General Precautions: Standard, fall   Orthopedic Precautions:LLE non weight bearing(L AKA)   Braces: N/A     Occupational Performance:    Bed Mobility:    · Patient completed Scooting/Bridging with stand by assistance  · Patient completed Supine to Sit with moderate assistance  · Patient completed Sit to Supine with moderate assistance    Functional Mobility/Transfers:  · Patient completed Sit <> Stand Transfer with minimum assistance  with  rolling walker   · Functional Mobility: Pt ambulated ~50 ft w/ close CGA and RW; pt required 2 standing rest breaks; no LOB     Activities of Daily Living:  · Upper Body Dressing: maximal assistance donning his back gown simulating a jacket while sitting EOB. Pt required assistance to maintain his sitting balance as he released his UE support on bed rail and EOB to thread thru sleeves  · Lower Body Dressing: total assistance to don R non skid sock sitting EOB    Cognitive/Visual Perceptual:  Cognitive/Psychosocial Skills:     -       Oriented to: Person, Place, Time and Situation   -       Follows Commands/attention:Follows multistep  commands  -       Communication: clear/fluent   Pt with CP    Physical Exam:  Upper Extremity Range of Motion:     -       Right Upper Extremity: WFL  -       Left Upper Extremity: WFL  Upper Extremity Strength:    -       Right Upper  Extremity: WFL  -       Left Upper Extremity: WFL   Strength:    -       Right Upper Extremity: WFL  -       Left Upper Extremity: WFL  Fine Motor Coordination:    -       Intact    AMPAC 6 Click ADL:  AMPAC Total Score: 16    Treatment & Education:  - OT role/POC  - Importance of OOB activity to maximize recovery   - Safety w/ functional mobility; hand placement to ensure safe transfers  - verbal cues for sequencing UBD tasks  - verbal cues for hand placement and trunk control to improve sitting balance in prep for ADL performance  Education:    Patient left HOB elevated with all lines intact, call button in reach and RN notified    GOALS:   Multidisciplinary Problems     Occupational Therapy Goals        Problem: Occupational Therapy Goal    Goal Priority Disciplines Outcome Interventions   Occupational Therapy Goal     OT, PT/OT Ongoing (interventions implemented as appropriate)    Description:  Goals to be met by: 3/29/2019     Patient will increase functional independence with ADLs by performing:    UE Dressing with Supervision.  LE Dressing with Minimal Assistance.  Grooming while EOB with Stand-by Assistance.  Toileting from toilet with Minimal Assistance for hygiene and clothing management.   Toilet transfer to toilet with Stand-by Assistance.                      History:     Past Medical History:   Diagnosis Date    Allergy     Arthritis     Hypertension     Neuropathy        Past Surgical History:   Procedure Laterality Date    ABDOMINAL SURGERY      AMPUTATION      right foot 5th digit    AMPUTATION, ABOVE KNEE Left 3/20/2019    [facility] 2ND FLR    COLONOSCOPY  04/23/2018    COLONOSCOPY N/A 4/23/2018    [facility]    ESOPHAGOGASTRODUODENOSCOPY  04/23/2018    ESOPHAGOGASTRODUODENOSCOPY (EGD) N/A 4/23/2018    Per[facility]    HIP SURGERY Bilateral     x2       Time Tracking:      OT Date of Treatment: 03/22/19  OT Start Time: 1602  OT Stop Time: 1625  OT Total Time (min): 23 min    Billable Minutes:Evaluation 8  Self Care/Home Management 15    Mandy Sanders OT  3/22/2019

## 2019-03-22 NOTE — PLAN OF CARE
Problem: Physical Therapy Goal  Goal: Physical Therapy Goal  Goals to be met by: 3/29/19    Patient will increase functional independence with mobility by performin. Supine to sit with CGA  2. Sit to supine with CGA  3. Sit to stand transfer with CGA using appropriate AD  4. Bed to chair transfer with CGA using appropriate AD  5. Gait  x 100 feet with min (A) using appropriate AD.   6. Lower extremity exercise program x20-30 reps per handout, with assistance as needed.      Outcome: Ongoing (interventions implemented as appropriate)  PT eval complete. Pt tolerated session well today with no complications and was able to complete all functional tasks. Pt with no LOB during ambulation and t/f's using RW for support. Pt required (A) to remain sitting upright at EOB initially but was able to correct himself with cueing from PT for (B) UE support. Pt will cont to benefit from therapy services and is appropriate for IP Rehab to maximize functional outcomes.

## 2019-03-22 NOTE — PLAN OF CARE
Extended Emergency Contact Information  Primary Emergency Contact: Jessica Colindres   [facility]  Home Phone: [phone]  Mobile Phone: [phone]  Relation: Other    Jenaro Torres MD  [address] [facility] [address]    Future Appointments   Date Time Provider Department Center   3/25/2019  4:15 PM Ammon Lee, DPM [facility]     Payor: HUMANA MANAGED MEDICARE / Plan: HUMANA SNP (SPECIAL NEEDS PLAN) / Product Type: Medicare Advantage /       [facility] [place] [facility] [place] - [address] [address]  [address] [place] [address]  Phone: [phone] Fax: [phone]       03/22/19 1010   Discharge Assessment   Assessment Type Discharge Planning Assessment   Confirmed/corrected address and phone number on facesheet? Yes   Assessment information obtained from? Patient;Medical Record   Expected Length of Stay (days) 5   Communicated expected length of stay with patient/caregiver yes   Prior to hospitilization cognitive status: Alert/Oriented   Prior to hospitalization functional status: Assistive Equipment   Current cognitive status: Alert/Oriented   Current Functional Status: Assistive Equipment;Needs Assistance   Lives With sibling(s)   Able to Return to Prior Arrangements yes   Is patient able to care for self after discharge? Unable to determine at this time (comments)   Patient's perception of discharge disposition rehab facility   Readmission Within the Last 30 Days no previous admission in last 30 days   Patient currently being followed by outpatient case management? No   Patient currently receives any other outside agency services? No   Equipment Currently Used at Home cane, straight   Do you have any problems affording any of your prescribed medications? No   Is the patient taking medications as prescribed? yes   Does the patient have transportation home? No   Does the patient receive services at the [facility]? No   Discharge Plan A Rehab    Discharge Plan B Skilled Nursing Facility   DME Needed Upon Discharge  wheelchair;walker, rolling;bedside commode   Patient/Family in Agreement with Plan yes

## 2019-03-22 NOTE — PROGRESS NOTES
[facility]  Vascular Surgery  Progress Note    Patient Name: Pranay Colindres  MRN: [id number]  Admission Date: 3/20/2019  Primary Care Provider: Jenaro Torres MD    Subjective:     Interval History: No acute events overnight. Pain controlled. AFVSS.    Post-Op Info:  Procedure(s) (LRB):  AMPUTATION, ABOVE KNEE (Left)   2 Days Post-Op       Medications:  Continuous Infusions:    Scheduled Meds:   amLODIPine  5 mg Oral Daily    aspirin  81 mg Oral Daily    heparin (porcine)  5,000 Units Subcutaneous Q8H    metoprolol succinate  50 mg Oral Daily    potassium chloride  40 mEq Oral Q4H     PRN Meds:HYDROcodone-acetaminophen, HYDROcodone-acetaminophen, HYDROmorphone, labetalol, ramelteon, sodium chloride 0.9%     Objective:     Vital Signs (Most Recent):  Temp: 98.2 °F (36.8 °C) (03/22/19 0755)  Pulse: 91 (03/22/19 0755)  Resp: 18 (03/22/19 0755)  BP: 135/86 (03/22/19 0755)  SpO2: 98 % (03/22/19 0755) Vital Signs (24h Range):  Temp:  [96.2 °F (35.7 °C)-98.3 °F (36.8 °C)] 98.2 °F (36.8 °C)  Pulse:  [] 91  Resp:  [18-20] 18  SpO2:  [95 %-98 %] 98 %  BP: (133-180)/() 135/86          Physical Exam   Constitutional: He appears well-developed and well-nourished.   HENT:   Head: Normocephalic and atraumatic.   Eyes: Conjunctivae and EOM are normal.   Neck: Normal range of motion. Neck supple.   Cardiovascular: Normal rate and regular rhythm.   Pulmonary/Chest: Effort normal. No respiratory distress.   Abdominal: Soft. He exhibits no distension.   Musculoskeletal:   L AKA incision clean/dry/intact       Significant Labs:  CBC:   Recent Labs   Lab 03/22/19  0524   WBC 14.14*   RBC 4.47*   HGB 12.6*   HCT 35.1*      MCV 79*   MCH 28.2   MCHC 35.9     CMP:   Recent Labs   Lab 03/22/19  0311   *   CALCIUM 9.2   *   K 3.3*   CO2 27   CL 98   BUN 8   CREATININE 0.7       Significant Diagnostics:  I have reviewed all pertinent imaging results/findings within the past 24  hours.    Assessment/Plan:     * Critical lower limb ischemia    59 y.o. male with HTN, arthritis, and cerebral palsy, tobacco abuse, PVD with complex revascularization in past who presented as a transfer from [facility] with a complaint of pulseless left foot, found to have occluded aortobifem and L fem-pop bypasses. He underwent L AKA on 3/20/19. 2 Days Post-Op.    -PT/OT & PM&R  -Strict I's/O's  -Regular diet  -Home meds  -Pain control  -PPx: SCD's/IS/SubQ heparin  -Dispo: anticipate DC to rehab         El Davis MD  Vascular Surgery  [facility]

## 2019-03-23 LAB
ANION GAP SERPL CALC-SCNC: 12 MMOL/L
BUN SERPL-MCNC: 12 MG/DL
CALCIUM SERPL-MCNC: 9.8 MG/DL
CHLORIDE SERPL-SCNC: 100 MMOL/L
CO2 SERPL-SCNC: 22 MMOL/L
CREAT SERPL-MCNC: 0.7 MG/DL
EST. GFR  (AFRICAN AMERICAN): >60 ML/MIN/1.73 M^2
EST. GFR  (NON AFRICAN AMERICAN): >60 ML/MIN/1.73 M^2
GLUCOSE SERPL-MCNC: 91 MG/DL
POTASSIUM SERPL-SCNC: 4.3 MMOL/L
SODIUM SERPL-SCNC: 134 MMOL/L

## 2019-03-23 PROCEDURE — 25000003 PHARM REV CODE 250: Performed by: STUDENT IN AN ORGANIZED HEALTH CARE EDUCATION/TRAINING PROGRAM

## 2019-03-23 PROCEDURE — 36415 COLL VENOUS BLD VENIPUNCTURE: CPT

## 2019-03-23 PROCEDURE — 97530 THERAPEUTIC ACTIVITIES: CPT

## 2019-03-23 PROCEDURE — 80048 BASIC METABOLIC PNL TOTAL CA: CPT

## 2019-03-23 PROCEDURE — 63600175 PHARM REV CODE 636 W HCPCS: Performed by: STUDENT IN AN ORGANIZED HEALTH CARE EDUCATION/TRAINING PROGRAM

## 2019-03-23 PROCEDURE — 97535 SELF CARE MNGMENT TRAINING: CPT

## 2019-03-23 PROCEDURE — 12000002 HC ACUTE/MED SURGE SEMI-PRIVATE ROOM

## 2019-03-23 RX ADMIN — METOPROLOL SUCCINATE 50 MG: 50 TABLET, EXTENDED RELEASE ORAL at 10:03

## 2019-03-23 RX ADMIN — HYDROCODONE BITARTRATE AND ACETAMINOPHEN 1 TABLET: 10; 325 TABLET ORAL at 10:03

## 2019-03-23 RX ADMIN — GABAPENTIN 300 MG: 300 CAPSULE ORAL at 09:03

## 2019-03-23 RX ADMIN — HYDROCODONE BITARTRATE AND ACETAMINOPHEN 1 TABLET: 10; 325 TABLET ORAL at 04:03

## 2019-03-23 RX ADMIN — RAMELTEON 8 MG: 8 TABLET, FILM COATED ORAL at 09:03

## 2019-03-23 RX ADMIN — HYDROCODONE BITARTRATE AND ACETAMINOPHEN 1 TABLET: 10; 325 TABLET ORAL at 03:03

## 2019-03-23 RX ADMIN — ASPIRIN 81 MG: 81 TABLET, COATED ORAL at 10:03

## 2019-03-23 RX ADMIN — AMLODIPINE BESYLATE 5 MG: 5 TABLET ORAL at 10:03

## 2019-03-23 RX ADMIN — HEPARIN SODIUM 5000 UNITS: 5000 INJECTION, SOLUTION INTRAVENOUS; SUBCUTANEOUS at 09:03

## 2019-03-23 RX ADMIN — HEPARIN SODIUM 5000 UNITS: 5000 INJECTION, SOLUTION INTRAVENOUS; SUBCUTANEOUS at 06:03

## 2019-03-23 RX ADMIN — BISACODYL 5 MG: 5 TABLET, COATED ORAL at 10:03

## 2019-03-23 NOTE — PLAN OF CARE
Problem: Occupational Therapy Goal  Goal: Occupational Therapy Goal  Goals to be met by: 3/29/2019     Patient will increase functional independence with ADLs by performing:    UE Dressing with Supervision.  LE Dressing with Minimal Assistance.  Grooming while EOB with Stand-by Assistance.  Toileting from toilet with Minimal Assistance for hygiene and clothing management.   Toilet transfer to toilet with Stand-by Assistance.     Outcome: Ongoing (interventions implemented as appropriate)  Goals remain appropriate    Comments: Cont OT POC

## 2019-03-23 NOTE — SUBJECTIVE & OBJECTIVE
Interval Hx: NAEON. AFVSS. Tolerating diet. No complaints.      Medications:  Continuous Infusions:    Scheduled Meds:   amLODIPine  5 mg Oral Daily    aspirin  81 mg Oral Daily    heparin (porcine)  5,000 Units Subcutaneous Q8H    metoprolol succinate  50 mg Oral Daily     PRN Meds:bisacodyl, gabapentin, HYDROcodone-acetaminophen, HYDROcodone-acetaminophen, HYDROmorphone, labetalol, ramelteon, sodium chloride 0.9%     Objective:     Vital Signs (Most Recent):  Temp: 98.7 °F (37.1 °C) (03/23/19 0803)  Pulse: 104 (03/23/19 0803)  Resp: 20 (03/23/19 0803)  BP: (!) 169/93 (03/23/19 0803)  SpO2: 95 % (03/23/19 0803) Vital Signs (24h Range):  Temp:  [97.3 °F (36.3 °C)-98.7 °F (37.1 °C)] 98.7 °F (37.1 °C)  Pulse:  [] 104  Resp:  [18-20] 20  SpO2:  [94 %-97 %] 95 %  BP: (141-173)/() 169/93          Physical Exam   Constitutional: He appears well-developed and well-nourished.   HENT:   Head: Normocephalic and atraumatic.   Eyes: Conjunctivae and EOM are normal.   Neck: Normal range of motion. Neck supple.   Cardiovascular: Normal rate and regular rhythm.   Pulmonary/Chest: Effort normal. No respiratory distress.   Abdominal: Soft. He exhibits no distension.   Musculoskeletal:   L AKA incision clean/dry/intact       Significant Labs:  CBC:   Recent Labs   Lab 03/22/19  0524   WBC 14.14*   RBC 4.47*   HGB 12.6*   HCT 35.1*      MCV 79*   MCH 28.2   MCHC 35.9     CMP:   Recent Labs   Lab 03/23/19  0600   GLU 91   CALCIUM 9.8   *   K 4.3   CO2 22*      BUN 12   CREATININE 0.7       Significant Diagnostics:  I have reviewed all pertinent imaging results/findings within the past 24 hours.

## 2019-03-23 NOTE — ASSESSMENT & PLAN NOTE
59 y.o. male with HTN, arthritis, and cerebral palsy, tobacco abuse, PVD with complex revascularization in past who presented as a transfer from [place] [facility] with a complaint of pulseless left foot, found to have occluded aortobifem and L fem-pop bypasses. He underwent L AKA on 3/20/19. 3 Days Post-Op.    -PT/OT & PM&R  -Strict I's/O's  -Regular diet  -Home meds  -Pain control  -PPx: SCD's/IS/SubQ heparin  -Dispo: anticipate DC to rehab, will follow up with social work today

## 2019-03-23 NOTE — PROGRESS NOTES
[facility]  Vascular Surgery  Progress Note    Patient Name: Pranay Colindres  MRN: [id number]  Admission Date: 3/20/2019  Primary Care Provider: Jenaro Torres MD    Subjective:     Post-Op Info:  Procedure(s) (LRB):  AMPUTATION, ABOVE KNEE (Left)   3 Days Post-Op     Interval Hx: NAEON. AFVSS. Tolerating diet. No complaints.      Medications:  Continuous Infusions:    Scheduled Meds:   amLODIPine  5 mg Oral Daily    aspirin  81 mg Oral Daily    heparin (porcine)  5,000 Units Subcutaneous Q8H    metoprolol succinate  50 mg Oral Daily     PRN Meds:bisacodyl, gabapentin, HYDROcodone-acetaminophen, HYDROcodone-acetaminophen, HYDROmorphone, labetalol, ramelteon, sodium chloride 0.9%     Objective:     Vital Signs (Most Recent):  Temp: 98.7 °F (37.1 °C) (03/23/19 0803)  Pulse: 104 (03/23/19 0803)  Resp: 20 (03/23/19 0803)  BP: (!) 169/93 (03/23/19 0803)  SpO2: 95 % (03/23/19 0803) Vital Signs (24h Range):  Temp:  [97.3 °F (36.3 °C)-98.7 °F (37.1 °C)] 98.7 °F (37.1 °C)  Pulse:  [] 104  Resp:  [18-20] 20  SpO2:  [94 %-97 %] 95 %  BP: (141-173)/() 169/93          Physical Exam   Constitutional: He appears well-developed and well-nourished.   HENT:   Head: Normocephalic and atraumatic.   Eyes: Conjunctivae and EOM are normal.   Neck: Normal range of motion. Neck supple.   Cardiovascular: Normal rate and regular rhythm.   Pulmonary/Chest: Effort normal. No respiratory distress.   Abdominal: Soft. He exhibits no distension.   Musculoskeletal:   L AKA incision clean/dry/intact       Significant Labs:  CBC:   Recent Labs   Lab 03/22/19  0524   WBC 14.14*   RBC 4.47*   HGB 12.6*   HCT 35.1*      MCV 79*   MCH 28.2   MCHC 35.9     CMP:   Recent Labs   Lab 03/23/19  0600   GLU 91   CALCIUM 9.8   *   K 4.3   CO2 22*      BUN 12   CREATININE 0.7       Significant Diagnostics:  I have reviewed all pertinent imaging results/findings within the past 24 hours.    Assessment/Plan:      * Critical lower limb ischemia    59 y.o. male with HTN, arthritis, and cerebral palsy, tobacco abuse, PVD with complex revascularization in past who presented as a transfer from [place] ED with a complaint of pulseless left foot, found to have occluded aortobifem and L fem-pop bypasses. He underwent L AKA on 3/20/19. 3 Days Post-Op.    -PT/OT & PM&R  -Strict I's/O's  -Regular diet  -Home meds  -Pain control  -PPx: SCD's/IS/SubQ heparin  -Dispo: anticipate DC to rehab, will follow up with social work today         Hugo Bansal MD  Vascular Surgery  [facility]

## 2019-03-23 NOTE — PLAN OF CARE
CM received notice patient needs Rehab choices.  Deanna PEMBERTON printed rehab list from medicare website, CM went to bring list to patient who states he wants to call his brother to see if he can come to look at the rehab list with patient.  CM informed patient that he needs to give choices by no later than Monday.

## 2019-03-23 NOTE — SUBJECTIVE & OBJECTIVE
Interval Hx: NAEON. AFVSS. Tolerated diet. No complaints.     Medications:  Continuous Infusions:    Scheduled Meds:   amLODIPine  5 mg Oral Daily    aspirin  81 mg Oral Daily    heparin (porcine)  5,000 Units Subcutaneous Q8H    metoprolol succinate  50 mg Oral Daily     PRN Meds:bisacodyl, gabapentin, HYDROcodone-acetaminophen, HYDROcodone-acetaminophen, HYDROmorphone, labetalol, ramelteon, sodium chloride 0.9%     Objective:     Vital Signs (Most Recent):  Temp: 98.7 °F (37.1 °C) (03/23/19 0803)  Pulse: 104 (03/23/19 0803)  Resp: 20 (03/23/19 0803)  BP: (!) 169/93 (03/23/19 0803)  SpO2: 95 % (03/23/19 0803) Vital Signs (24h Range):  Temp:  [97.3 °F (36.3 °C)-98.7 °F (37.1 °C)] 98.7 °F (37.1 °C)  Pulse:  [] 104  Resp:  [18-20] 20  SpO2:  [94 %-97 %] 95 %  BP: (141-173)/() 169/93          Physical Exam   Constitutional: He appears well-developed and well-nourished.   HENT:   Head: Normocephalic and atraumatic.   Eyes: Conjunctivae and EOM are normal.   Neck: Normal range of motion. Neck supple.   Cardiovascular: Normal rate and regular rhythm.   Pulmonary/Chest: Effort normal. No respiratory distress.   Abdominal: Soft. He exhibits no distension.   Musculoskeletal:   L AKA incision clean/dry/intact, redressed wound    Nursing note and vitals reviewed.      Significant Labs:  CBC:   Recent Labs   Lab 03/22/19  0524   WBC 14.14*   RBC 4.47*   HGB 12.6*   HCT 35.1*      MCV 79*   MCH 28.2   MCHC 35.9     CMP:   Recent Labs   Lab 03/23/19  0600   GLU 91   CALCIUM 9.8   *   K 4.3   CO2 22*      BUN 12   CREATININE 0.7       Significant Diagnostics:  I have reviewed all pertinent imaging results/findings within the past 24 hours.

## 2019-03-23 NOTE — PT/OT/SLP PROGRESS
Occupational Therapy   Treatment    Name: Pranay Colindres  MRN: [id number]  Admitting Diagnosis:  Critical lower limb ischemia  3 Days Post-Op    Recommendations:     Discharge Recommendations: rehabilitation facility  Discharge Equipment Recommendations:  wheelchair, manual(Hip Kit; w/c with removeable armrests and elevating leg rests)  Barriers to discharge:  Inaccessible home environment    Assessment:     Pranay Colindres is a 59 y.o. male with a medical diagnosis of Critical lower limb ischemia.  He presents with the following performance deficits affecting function:  weakness, impaired endurance, impaired self care skills, impaired functional mobilty, gait instability, decreased lower extremity function, impaired balance, pain, impaired joint extensibility, orthopedic precautions.     Pt tolerated today's session well. Pt continues to demonstrate impaired sitting balance while EOB with fear of removing his B UE support from rails to perform self-care tasks. Pt demonstrated good tolerance to ambulate household distance to sink and perform grooming task in standing. Pt tolerated standing ADL task for ~2 minutes prior to requesting to sit in bedside chair. Pt educated on R LE exercises as well as L hip exercises to improve strength and all mobility. Pt continues to benefit from skilled OT to address the above listed impairments.     Rehab Prognosis:  Good; patient would benefit from acute skilled OT services to address these deficits and reach maximum level of function.       Plan:     Patient to be seen 4 x/week to address the above listed problems via self-care/home management, therapeutic activities, therapeutic exercises  · Plan of Care Expires: 04/21/19  · Plan of Care Reviewed with: patient    Subjective     Pain/Comfort:  · Pain Rating 1: 7/10  · Location - Side 1: Left  · Location - Orientation 1: generalized  · Location 1: hip  · Pain Rating Post-Intervention 1: (did not rate)    Objective:      Communicated with: RN prior to session.  Patient found HOB elevated with telemetry upon OT entry to room.    General Precautions: Standard, fall   Orthopedic Precautions:LLE non weight bearing   Braces: N/A     Occupational Performance:     Bed Mobility:    · Patient completed Scooting/Bridging with minimum assistance facilitating L hip   · Patient completed Supine to Sit with contact guard assistance     Functional Mobility/Transfers:  · Patient completed Sit <> Stand Transfer with minimum assistance  with  rolling walker   · Functional Mobility: Pt ambulated household distance w/ slight min A - close CGA and RW. No signs of LOB or SOB.     Activities of Daily Living:  · Grooming: close CGA for balance and safety while pt stood at sink w/ LUE support on sink and R UE washing his face      [facility] 6 Click ADL: 15    Treatment & Education:  - OT POC  - Importance of OOB activity to maximize recovery   - Safety w/ functional mobility; hand placement to ensure safe transfers to various surfaces  - R LE exercises and L hip ROM exercise sitting [facility]    Patient left up in chair with all lines intact, call button in reach and RN notifiedEducation:      GOALS:   Multidisciplinary Problems     Occupational Therapy Goals        Problem: Occupational Therapy Goal    Goal Priority Disciplines Outcome Interventions   Occupational Therapy Goal     OT, PT/OT Ongoing (interventions implemented as appropriate)    Description:  Goals to be met by: 3/29/2019     Patient will increase functional independence with ADLs by performing:    UE Dressing with Supervision.  LE Dressing with Minimal Assistance.  Grooming while EOB with Stand-by Assistance.  Toileting from toilet with Minimal Assistance for hygiene and clothing management.   Toilet transfer to toilet with Stand-by Assistance.                      Time Tracking:     OT Date of Treatment: 03/23/19  OT Start Time: 1103  OT Stop Time: 1126  OT Total Time (min): 23 min    Billable  Minutes:Self Care/Home Management 15  Therapeutic Activity 8    Mandy Sanders, OT  3/23/2019

## 2019-03-24 LAB
ANION GAP SERPL CALC-SCNC: 11 MMOL/L (ref 8–16)
BASOPHILS # BLD AUTO: 0.05 K/UL (ref 0–0.2)
BASOPHILS NFR BLD: 0.4 % (ref 0–1.9)
BUN SERPL-MCNC: 15 MG/DL (ref 6–20)
CALCIUM SERPL-MCNC: 9.6 MG/DL (ref 8.7–10.5)
CHLORIDE SERPL-SCNC: 98 MMOL/L (ref 95–110)
CO2 SERPL-SCNC: 26 MMOL/L (ref 23–29)
CREAT SERPL-MCNC: 0.7 MG/DL (ref 0.5–1.4)
DIFFERENTIAL METHOD: ABNORMAL
EOSINOPHIL # BLD AUTO: 0.3 K/UL (ref 0–0.5)
EOSINOPHIL NFR BLD: 2.2 % (ref 0–8)
ERYTHROCYTE [DISTWIDTH] IN BLOOD BY AUTOMATED COUNT: 13.2 % (ref 11.5–14.5)
EST. GFR  (AFRICAN AMERICAN): >60 ML/MIN/1.73 M^2
EST. GFR  (NON AFRICAN AMERICAN): >60 ML/MIN/1.73 M^2
GLUCOSE SERPL-MCNC: 103 MG/DL (ref 70–110)
HCT VFR BLD AUTO: 35.6 % (ref 40–54)
HGB BLD-MCNC: 12.7 G/DL (ref 14–18)
IMM GRANULOCYTES # BLD AUTO: 0.07 K/UL (ref 0–0.04)
IMM GRANULOCYTES NFR BLD AUTO: 0.6 % (ref 0–0.5)
LYMPHOCYTES # BLD AUTO: 2.4 K/UL (ref 1–4.8)
LYMPHOCYTES NFR BLD: 18.7 % (ref 18–48)
MCH RBC QN AUTO: 27.7 PG (ref 27–31)
MCHC RBC AUTO-ENTMCNC: 35.7 G/DL (ref 32–36)
MCV RBC AUTO: 78 FL (ref 82–98)
MONOCYTES # BLD AUTO: 1.3 K/UL (ref 0.3–1)
MONOCYTES NFR BLD: 10.6 % (ref 4–15)
NEUTROPHILS # BLD AUTO: 8.6 K/UL (ref 1.8–7.7)
NEUTROPHILS NFR BLD: 67.5 % (ref 38–73)
NRBC BLD-RTO: 0 /100 WBC
PLATELET # BLD AUTO: 440 K/UL (ref 150–350)
PMV BLD AUTO: 10.1 FL (ref 9.2–12.9)
POTASSIUM SERPL-SCNC: 4.1 MMOL/L (ref 3.5–5.1)
RBC # BLD AUTO: 4.58 M/UL (ref 4.6–6.2)
SODIUM SERPL-SCNC: 135 MMOL/L (ref 136–145)
WBC # BLD AUTO: 12.68 K/UL (ref 3.9–12.7)

## 2019-03-24 PROCEDURE — 97110 THERAPEUTIC EXERCISES: CPT

## 2019-03-24 PROCEDURE — 25000003 PHARM REV CODE 250: Performed by: STUDENT IN AN ORGANIZED HEALTH CARE EDUCATION/TRAINING PROGRAM

## 2019-03-24 PROCEDURE — 12000002 HC ACUTE/MED SURGE SEMI-PRIVATE ROOM

## 2019-03-24 PROCEDURE — 36415 COLL VENOUS BLD VENIPUNCTURE: CPT

## 2019-03-24 PROCEDURE — 97116 GAIT TRAINING THERAPY: CPT

## 2019-03-24 PROCEDURE — 80048 BASIC METABOLIC PNL TOTAL CA: CPT

## 2019-03-24 PROCEDURE — 85025 COMPLETE CBC W/AUTO DIFF WBC: CPT

## 2019-03-24 PROCEDURE — 63600175 PHARM REV CODE 636 W HCPCS: Performed by: STUDENT IN AN ORGANIZED HEALTH CARE EDUCATION/TRAINING PROGRAM

## 2019-03-24 RX ADMIN — ASPIRIN 81 MG: 81 TABLET, COATED ORAL at 08:03

## 2019-03-24 RX ADMIN — HYDROCODONE BITARTRATE AND ACETAMINOPHEN 1 TABLET: 10; 325 TABLET ORAL at 07:03

## 2019-03-24 RX ADMIN — LABETALOL HCL IV SOLN PREFILLED SYRINGE 20 MG/4ML (5 MG/ML) 10 MG: 20/4 SOLUTION PREFILLED SYRINGE at 08:03

## 2019-03-24 RX ADMIN — HYDROCODONE BITARTRATE AND ACETAMINOPHEN 1 TABLET: 10; 325 TABLET ORAL at 01:03

## 2019-03-24 RX ADMIN — RAMELTEON 8 MG: 8 TABLET, FILM COATED ORAL at 08:03

## 2019-03-24 RX ADMIN — HEPARIN SODIUM 5000 UNITS: 5000 INJECTION, SOLUTION INTRAVENOUS; SUBCUTANEOUS at 09:03

## 2019-03-24 RX ADMIN — GABAPENTIN 300 MG: 300 CAPSULE ORAL at 08:03

## 2019-03-24 RX ADMIN — METOPROLOL SUCCINATE 50 MG: 50 TABLET, EXTENDED RELEASE ORAL at 08:03

## 2019-03-24 RX ADMIN — HYDROCODONE BITARTRATE AND ACETAMINOPHEN 1 TABLET: 10; 325 TABLET ORAL at 05:03

## 2019-03-24 RX ADMIN — AMLODIPINE BESYLATE 5 MG: 5 TABLET ORAL at 08:03

## 2019-03-24 RX ADMIN — HEPARIN SODIUM 5000 UNITS: 5000 INJECTION, SOLUTION INTRAVENOUS; SUBCUTANEOUS at 05:03

## 2019-03-24 RX ADMIN — HEPARIN SODIUM 5000 UNITS: 5000 INJECTION, SOLUTION INTRAVENOUS; SUBCUTANEOUS at 01:03

## 2019-03-24 NOTE — PLAN OF CARE
Problem: Physical Therapy Goal  Goal: Physical Therapy Goal  Goals to be met by: 3/29/19    Patient will increase functional independence with mobility by performin. Supine to sit with CGA met  2. Sit to supine with CGA  3. Sit to stand transfer with CGA using appropriate AD  4. Bed to chair transfer with CGA using appropriate AD  5. Gait  x 100 feet with min (A) using appropriate AD.   6. Lower extremity exercise program x20-30 reps per handout, with assistance as needed.       Patient goals remain appropriate.  Patient will continue to benefit from further PT services.  Win Fraser, PTA

## 2019-03-24 NOTE — PROGRESS NOTES
[facility]  Vascular Surgery  Progress Note    Patient Name: Pranay Colindres  MRN: [id number]  Admission Date: 3/20/2019  Primary Care Provider: Jenaro Torres MD    Subjective:       Post-Op Info:  Procedure(s) (LRB):  AMPUTATION, ABOVE KNEE (Left)   4 Days Post-Op     Interval Hx: NAEON. AFVSS. Tolerating diet. No complaints. SW discussed preferences for rehab. Not given. Said he will give today. 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      Medications:  Continuous Infusions:    Scheduled Meds:   amLODIPine  5 mg Oral Daily    aspirin  81 mg Oral Daily    heparin (porcine)  5,000 Units Subcutaneous Q8H    metoprolol succinate  50 mg Oral Daily     PRN Meds:bisacodyl, gabapentin, HYDROcodone-acetaminophen, HYDROcodone-acetaminophen, HYDROmorphone, labetalol, ramelteon, sodium chloride 0.9%     Objective:     Vital Signs (Most Recent):  Temp: 96.4 °F (35.8 °C) (03/24/19 0751)  Pulse: 106 (03/24/19 0856)  Resp: 20 (03/24/19 0751)  BP: (!) 165/90 (03/24/19 0751)  SpO2: 100 % (03/24/19 0751) Vital Signs (24h Range):  Temp:  [96.4 °F (35.8 °C)-98.8 °F (37.1 °C)] 96.4 °F (35.8 °C)  Pulse:  [] 106  Resp:  [20] 20  SpO2:  [95 %-100 %] 100 %  BP: (140-165)/(84-94) 165/90         Physical Exam   Constitutional: He appears well-developed and well-nourished.   HENT:   Head: Normocephalic and atraumatic.   Eyes: Conjunctivae and EOM are normal.   Neck: Normal range of motion. Neck supple.   Cardiovascular: Normal rate and regular rhythm.   Pulmonary/Chest: Effort normal. No respiratory distress.   Abdominal: Soft. He exhibits no distension.   Musculoskeletal:   L AKA incision clean/dry/intact, R edge of incision dusky 0.5 cm  Nursing note and vitals reviewed.      Significant Labs:  CBC:   No results for input(s): WBC, RBC, HGB, HCT, PLT, MCV, MCH, MCHC in the last 48 hours.  CMP:   Recent Labs   Lab 03/24/19  0425      CALCIUM 9.6   *   K  4.1   CO2 26   CL 98   BUN 15   CREATININE 0.7       Significant Diagnostics:  I have reviewed all pertinent imaging results/findings within the past 24 hours.    Assessment/Plan:     * Critical lower limb ischemia  59 y.o. male with HTN, arthritis, and cerebral palsy, tobacco abuse, PVD with complex revascularization in past who presented as a transfer from [facility] with a complaint of pulseless left foot, found to have occluded aortobifem and L fem-pop bypasses. He underwent L AKA on 3/20/19. 4 Days Post-Op.    -PT/OT & PM&R  -Strict I's/O's  -Regular diet  -Home meds  -Pain control  -PPx: SCD's/IS/SubQ heparin    -Dispo: anticipate DC to rehab, patient will provide preferences to  today. Has a first choice of place in [place] that he used to work for. Otherwise prefers somewhere in [place].        Hugo Bansal MD  Vascular Surgery  [facility]

## 2019-03-24 NOTE — ASSESSMENT & PLAN NOTE
59 y.o. male with HTN, arthritis, and cerebral palsy, tobacco abuse, PVD with complex revascularization in past who presented as a transfer from [place] ED with a complaint of pulseless left foot, found to have occluded aortobifem and L fem-pop bypasses. He underwent L AKA on 3/20/19. 4 Days Post-Op.    -PT/OT & PM&R  -Strict I's/O's  -Regular diet  -Home meds  -Pain control  -PPx: SCD's/IS/SubQ heparin    -Dispo: anticipate DC to rehab, patient will provide preferences to SW today. Has a first choice of place in [place] that he used to work for. Otherwise prefers somewhere in [place].

## 2019-03-24 NOTE — SUBJECTIVE & OBJECTIVE
Interval Hx: NAEON. AFVSS. Tolerating diet. No complaints. SW discussed preferences for rehab. Not given. Said he will give today. 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      Medications:  Continuous Infusions:    Scheduled Meds:   amLODIPine  5 mg Oral Daily    aspirin  81 mg Oral Daily    heparin (porcine)  5,000 Units Subcutaneous Q8H    metoprolol succinate  50 mg Oral Daily     PRN Meds:bisacodyl, gabapentin, HYDROcodone-acetaminophen, HYDROcodone-acetaminophen, HYDROmorphone, labetalol, ramelteon, sodium chloride 0.9%     Objective:     Vital Signs (Most Recent):  Temp: 96.4 °F (35.8 °C) (03/24/19 0751)  Pulse: 106 (03/24/19 0856)  Resp: 20 (03/24/19 0751)  BP: (!) 165/90 (03/24/19 0751)  SpO2: 100 % (03/24/19 0751) Vital Signs (24h Range):  Temp:  [96.4 °F (35.8 °C)-98.8 °F (37.1 °C)] 96.4 °F (35.8 °C)  Pulse:  [] 106  Resp:  [20] 20  SpO2:  [95 %-100 %] 100 %  BP: (140-165)/(84-94) 165/90         Physical Exam   Constitutional: He appears well-developed and well-nourished.   HENT:   Head: Normocephalic and atraumatic.   Eyes: Conjunctivae and EOM are normal.   Neck: Normal range of motion. Neck supple.   Cardiovascular: Normal rate and regular rhythm.   Pulmonary/Chest: Effort normal. No respiratory distress.   Abdominal: Soft. He exhibits no distension.   Musculoskeletal:   L AKA incision clean/dry/intact   Nursing note and vitals reviewed.      Significant Labs:  CBC:   No results for input(s): WBC, RBC, HGB, HCT, PLT, MCV, MCH, MCHC in the last 48 hours.  CMP:   Recent Labs   Lab 03/24/19  0425      CALCIUM 9.6   *   K 4.1   CO2 26   CL 98   BUN 15   CREATININE 0.7       Significant Diagnostics:  I have reviewed all pertinent imaging results/findings within the past 24 hours.

## 2019-03-24 NOTE — PLAN OF CARE
CM received call from [facility] - pt relayed to MD that he was ready w/ his facility choices. CM to bedside and pt provided 2 choices: 1) [facility] (SNF) and 2) [facility] p [phone]. CM emailed @[facility] w/ referral choices so they can be sent out via .     03/24/19 9834   Discharge Reassessment   Assessment Type Discharge Planning Reassessment   Provided patient/caregiver education on the expected discharge date and the discharge plan Yes   Discharge Plan A Rehab   Discharge Plan B Skilled Nursing Facility   Anticipated Discharge Disposition SNF   Can the patient answer the patient profile reliably? Yes, cognitively intact   How does the patient rate their overall health at the present time? Fair   Describe the patient's ability to walk at the present time. Major restrictions/daily assistance from another person   Post-Acute Status   Post-Acute Authorization Placement   Post-Acute Placement Status Referrals Sent

## 2019-03-24 NOTE — PT/OT/SLP PROGRESS
Physical Therapy Treatment    Patient Name:  Pranay Colindres   MRN:  [id number]    Recommendations:     Discharge Recommendations:  rehabilitation facility   Discharge Equipment Recommendations: wheelchair, manual   Barriers to discharge: Inaccessible home and Decreased caregiver support    Assessment:     Pranay Colindres is a 59 y.o. male admitted with a medical diagnosis of Critical lower limb ischemia.  He presents with the following impairments/functional limitations:  weakness, impaired functional mobilty, gait instability, impaired endurance, impaired balance, impaired self care skills, orthopedic precautions, pain, decreased lower extremity function, decreased ROM. Mr. Colindres tolerated increase distance with gait training well.  Required less assistance with bed mobility.  Noted poor sitting balance, but fair standing balance.  Mr. Colindres is progressing well toward goals. He would benefit from further IP therapy.    Rehab Prognosis: Fair; patient would benefit from acute skilled PT services to address these deficits and reach maximum level of function.    Recent Surgery: Procedure(s) (LRB):  AMPUTATION, ABOVE KNEE (Left) 4 Days Post-Op    Plan:     During this hospitalization, patient to be seen 4 x/week to address the identified rehab impairments via gait training, therapeutic exercises, neuromuscular re-education and progress toward the following goals:    · Plan of Care Expires:  04/22/19    Subjective     Chief Complaint: L LE pain  Patient/Family Comments/goals: Everyone says I have a good attitude toward loosing my leg.  Pain/Comfort:  · Pain Rating 1: 7/10  · Location - Side 1: Left  · Location 1: leg      Objective:     Communicated with NSG prior to session.  Patient found supine with telemetry upon PT entry to room.     General Precautions: Standard, fall   Orthopedic Precautions:LLE non weight bearing   Braces:       Functional Mobility:  · Bed Mobility:   VC's for technique and used HR  · Supine  to Sit: stand by assistance  · Transfers:   VC's for technique  · Sit to Stand:  minimum assistance with rolling walker  · Gait: amb 65-70 feet with RW CGA/min assitance. No LOB. VC's for walker management and safety.   · Balance: Poor sitting ( not LOB x2 with CGA to recover), Fair standing with RW, no LOB      AM-PAC 6 CLICK MOBILITY  Turning over in bed (including adjusting bedclothes, sheets and blankets)?: 4  Sitting down on and standing up from a chair with arms (e.g., wheelchair, bedside commode, etc.): 3  Moving from lying on back to sitting on the side of the bed?: 3  Moving to and from a bed to a chair (including a wheelchair)?: 3  Need to walk in hospital room?: 3  Climbing 3-5 steps with a railing?: 1  Basic Mobility Total Score: 17       Therapeutic Activities and Exercises:   Mr. Colindres performed performed supine hip extension (against the bed) x20 reps, hip flexion, x20 reps, GS x20 reps, side-lying hip extension x20 reps and side-lying hip ABD x20 reps with V/T cues for technique.  Place BS commode in room for patient to use.      Patient left up in chair with all lines intact and call button in reach..    GOALS:   Multidisciplinary Problems     Physical Therapy Goals        Problem: Physical Therapy Goal    Goal Priority Disciplines Outcome Goal Variances Interventions   Physical Therapy Goal     PT, PT/OT Ongoing (interventions implemented as appropriate)     Description:  Goals to be met by: 3/29/19    Patient will increase functional independence with mobility by performin. Supine to sit with CGA met  2. Sit to supine with CGA  3. Sit to stand transfer with CGA using appropriate AD  4. Bed to chair transfer with CGA using appropriate AD  5. Gait  x 100 feet with min (A) using appropriate AD.   6. Lower extremity exercise program x20-30 reps per handout, with assistance as needed.                         Time Tracking:     PT Received On: 19  PT Start Time: 718     PT Stop Time:  0745  PT Total Time (min): 27 min     Billable Minutes: Gait Training 10, Therapeutic Activity 9 and Therapeutic Exercise 8    Treatment Type: Treatment  PT/PTA: PTA     PTA Visit Number: 1     Win Fraser II, PTA  03/24/2019

## 2019-03-25 LAB
ANION GAP SERPL CALC-SCNC: 12 MMOL/L (ref 8–16)
BASOPHILS # BLD AUTO: 0.04 K/UL (ref 0–0.2)
BASOPHILS NFR BLD: 0.4 % (ref 0–1.9)
BUN SERPL-MCNC: 13 MG/DL (ref 6–20)
CALCIUM SERPL-MCNC: 9.7 MG/DL (ref 8.7–10.5)
CHLORIDE SERPL-SCNC: 97 MMOL/L (ref 95–110)
CO2 SERPL-SCNC: 27 MMOL/L (ref 23–29)
CREAT SERPL-MCNC: 0.7 MG/DL (ref 0.5–1.4)
DIFFERENTIAL METHOD: ABNORMAL
EOSINOPHIL # BLD AUTO: 0.4 K/UL (ref 0–0.5)
EOSINOPHIL NFR BLD: 3.1 % (ref 0–8)
ERYTHROCYTE [DISTWIDTH] IN BLOOD BY AUTOMATED COUNT: 13.2 % (ref 11.5–14.5)
EST. GFR  (AFRICAN AMERICAN): >60 ML/MIN/1.73 M^2
EST. GFR  (NON AFRICAN AMERICAN): >60 ML/MIN/1.73 M^2
GLUCOSE SERPL-MCNC: 95 MG/DL (ref 70–110)
HCT VFR BLD AUTO: 32.9 % (ref 40–54)
HGB BLD-MCNC: 11.8 G/DL (ref 14–18)
IMM GRANULOCYTES # BLD AUTO: 0.07 K/UL (ref 0–0.04)
IMM GRANULOCYTES NFR BLD AUTO: 0.6 % (ref 0–0.5)
LYMPHOCYTES # BLD AUTO: 1.9 K/UL (ref 1–4.8)
LYMPHOCYTES NFR BLD: 16.7 % (ref 18–48)
MCH RBC QN AUTO: 28.1 PG (ref 27–31)
MCHC RBC AUTO-ENTMCNC: 35.9 G/DL (ref 32–36)
MCV RBC AUTO: 78 FL (ref 82–98)
MONOCYTES # BLD AUTO: 1.3 K/UL (ref 0.3–1)
MONOCYTES NFR BLD: 11.5 % (ref 4–15)
NEUTROPHILS # BLD AUTO: 7.7 K/UL (ref 1.8–7.7)
NEUTROPHILS NFR BLD: 67.7 % (ref 38–73)
NRBC BLD-RTO: 0 /100 WBC
PLATELET # BLD AUTO: 450 K/UL (ref 150–350)
PMV BLD AUTO: 10.6 FL (ref 9.2–12.9)
POTASSIUM SERPL-SCNC: 4 MMOL/L (ref 3.5–5.1)
RBC # BLD AUTO: 4.2 M/UL (ref 4.6–6.2)
SODIUM SERPL-SCNC: 136 MMOL/L (ref 136–145)
WBC # BLD AUTO: 11.4 K/UL (ref 3.9–12.7)

## 2019-03-25 PROCEDURE — 25000003 PHARM REV CODE 250: Performed by: STUDENT IN AN ORGANIZED HEALTH CARE EDUCATION/TRAINING PROGRAM

## 2019-03-25 PROCEDURE — 99232 SBSQ HOSP IP/OBS MODERATE 35: CPT | Mod: ,,, | Performed by: NURSE PRACTITIONER

## 2019-03-25 PROCEDURE — 86580 TB INTRADERMAL TEST: CPT | Performed by: STUDENT IN AN ORGANIZED HEALTH CARE EDUCATION/TRAINING PROGRAM

## 2019-03-25 PROCEDURE — 36415 COLL VENOUS BLD VENIPUNCTURE: CPT

## 2019-03-25 PROCEDURE — 80048 BASIC METABOLIC PNL TOTAL CA: CPT

## 2019-03-25 PROCEDURE — 63600175 PHARM REV CODE 636 W HCPCS: Performed by: SURGERY

## 2019-03-25 PROCEDURE — 85025 COMPLETE CBC W/AUTO DIFF WBC: CPT

## 2019-03-25 PROCEDURE — 63600175 PHARM REV CODE 636 W HCPCS: Performed by: STUDENT IN AN ORGANIZED HEALTH CARE EDUCATION/TRAINING PROGRAM

## 2019-03-25 PROCEDURE — 99232 PR SUBSEQUENT HOSPITAL CARE,LEVL II: ICD-10-PCS | Mod: ,,, | Performed by: NURSE PRACTITIONER

## 2019-03-25 PROCEDURE — 12000002 HC ACUTE/MED SURGE SEMI-PRIVATE ROOM

## 2019-03-25 PROCEDURE — 93926 LOWER EXTREMITY STUDY: CPT | Performed by: SURGERY

## 2019-03-25 PROCEDURE — 94761 N-INVAS EAR/PLS OXIMETRY MLT: CPT

## 2019-03-25 RX ORDER — ATORVASTATIN CALCIUM 10 MG/1
40 TABLET, FILM COATED ORAL DAILY
Status: DISCONTINUED | OUTPATIENT
Start: 2019-03-25 | End: 2019-03-28 | Stop reason: HOSPADM

## 2019-03-25 RX ORDER — HEPARIN SODIUM,PORCINE/D5W 25000/250
18 INTRAVENOUS SOLUTION INTRAVENOUS CONTINUOUS
Status: DISCONTINUED | OUTPATIENT
Start: 2019-03-25 | End: 2019-03-25

## 2019-03-25 RX ORDER — HEPARIN SODIUM 5000 [USP'U]/ML
5000 INJECTION, SOLUTION INTRAVENOUS; SUBCUTANEOUS EVERY 8 HOURS
Status: DISCONTINUED | OUTPATIENT
Start: 2019-03-25 | End: 2019-03-26

## 2019-03-25 RX ADMIN — HYDROCODONE BITARTRATE AND ACETAMINOPHEN 1 TABLET: 10; 325 TABLET ORAL at 08:03

## 2019-03-25 RX ADMIN — AMLODIPINE BESYLATE 5 MG: 5 TABLET ORAL at 08:03

## 2019-03-25 RX ADMIN — TUBERCULIN PURIFIED PROTEIN DERIVATIVE 5 UNITS: 5 INJECTION, SOLUTION INTRADERMAL at 11:03

## 2019-03-25 RX ADMIN — ASPIRIN 81 MG: 81 TABLET, COATED ORAL at 08:03

## 2019-03-25 RX ADMIN — GABAPENTIN 300 MG: 300 CAPSULE ORAL at 08:03

## 2019-03-25 RX ADMIN — HEPARIN SODIUM 5000 UNITS: 5000 INJECTION, SOLUTION INTRAVENOUS; SUBCUTANEOUS at 05:03

## 2019-03-25 RX ADMIN — METOPROLOL SUCCINATE 50 MG: 50 TABLET, EXTENDED RELEASE ORAL at 08:03

## 2019-03-25 RX ADMIN — HYDROCODONE BITARTRATE AND ACETAMINOPHEN 1 TABLET: 10; 325 TABLET ORAL at 01:03

## 2019-03-25 RX ADMIN — LABETALOL HCL IV SOLN PREFILLED SYRINGE 20 MG/4ML (5 MG/ML) 10 MG: 20/4 SOLUTION PREFILLED SYRINGE at 07:03

## 2019-03-25 RX ADMIN — HEPARIN SODIUM 5000 UNITS: 5000 INJECTION, SOLUTION INTRAVENOUS; SUBCUTANEOUS at 01:03

## 2019-03-25 RX ADMIN — LABETALOL HCL IV SOLN PREFILLED SYRINGE 20 MG/4ML (5 MG/ML) 10 MG: 20/4 SOLUTION PREFILLED SYRINGE at 05:03

## 2019-03-25 RX ADMIN — HYDROMORPHONE HYDROCHLORIDE 0.5 MG: 1 INJECTION, SOLUTION INTRAMUSCULAR; INTRAVENOUS; SUBCUTANEOUS at 02:03

## 2019-03-25 RX ADMIN — ATORVASTATIN CALCIUM 40 MG: 10 TABLET, FILM COATED ORAL at 08:03

## 2019-03-25 RX ADMIN — HEPARIN SODIUM 5000 UNITS: 5000 INJECTION, SOLUTION INTRAVENOUS; SUBCUTANEOUS at 09:03

## 2019-03-25 RX ADMIN — HYDROMORPHONE HYDROCHLORIDE 0.5 MG: 1 INJECTION, SOLUTION INTRAMUSCULAR; INTRAVENOUS; SUBCUTANEOUS at 05:03

## 2019-03-25 RX ADMIN — HYDROMORPHONE HYDROCHLORIDE 0.5 MG: 1 INJECTION, SOLUTION INTRAMUSCULAR; INTRAVENOUS; SUBCUTANEOUS at 08:03

## 2019-03-25 RX ADMIN — HYDROCODONE BITARTRATE AND ACETAMINOPHEN 1 TABLET: 10; 325 TABLET ORAL at 02:03

## 2019-03-25 NOTE — PLAN OF CARE
REFERRALS SENT TO    [facility] OF [facility]

## 2019-03-25 NOTE — PROGRESS NOTES
[facility]  Physical Medicine & Rehab  Progress Note    Patient Name: Pranay Colindres  MRN: [id number]  Admission Date: 3/20/2019  Length of Stay: 5 days  Attending Physician: Thalia Moreno MD    Subjective:     Principal Problem:Critical lower limb ischemia    Hospital Course:   3/23/19: Participated w/ OT. Bed mobility CGA- Tae. Sit to stand Tae w/ RW. Grooming CGA.   3/24/19: participated w/ PT. Bed mobility SBA. Sit to stand Tae w/ RW. Ambulated 70 ft CGA-Tae w/ RW.     Interval History 3/25/2019:  Patient is seen for follow-up rehab evaluation and recommendations: PCA pump d/c'd, pain controlled. Participating with therapy.     HPI, Past Medical, Family, and Social History remains the same as documented in the initial encounter.    Scheduled Medications:    amLODIPine  5 mg Oral Daily    aspirin  81 mg Oral Daily    atorvastatin  40 mg Oral Daily    heparin (porcine)  5,000 Units Subcutaneous Q8H    metoprolol succinate  50 mg Oral Daily       Diagnostic Results: Labs: Reviewed    PRN Medications: bisacodyl, gabapentin, HYDROcodone-acetaminophen, HYDROcodone-acetaminophen, HYDROmorphone, labetalol, ramelteon, sodium chloride 0.9%    Review of Systems   Constitutional: Negative for fatigue and fever.   HENT: Negative for trouble swallowing and voice change.    Respiratory: Negative for cough and shortness of breath.    Cardiovascular: Negative for chest pain and leg swelling.   Gastrointestinal: Negative for abdominal distention and abdominal pain.   Genitourinary: Negative for difficulty urinating and flank pain.   Musculoskeletal: Positive for gait problem. Negative for back pain.   Skin: Positive for wound (s/p L AKA). Negative for color change and rash.   Neurological: Positive for weakness and numbness. Negative for speech difficulty.   Psychiatric/Behavioral: Negative for agitation, behavioral problems and confusion.     Objective:     Vital Signs (Most Recent):  Temp: 97.3  °F (36.3 °C) (03/25/19 1129)  Pulse: 97 (03/25/19 1129)  Resp: 17 (03/25/19 1129)  BP: (!) 145/85 (03/25/19 1129)  SpO2: 97 % (03/25/19 1129)    Vital Signs (24h Range):  Temp:  [95.4 °F (35.2 °C)-98.5 °F (36.9 °C)] 97.3 °F (36.3 °C)  Pulse:  [] 97  Resp:  [16-20] 17  SpO2:  [94 %-99 %] 97 %  BP: (117-177)/(77-96) 145/85     Physical Exam   Constitutional: He is oriented to person, place, and time. He appears well-developed and well-nourished.   HENT:   Head: Normocephalic and atraumatic.   Eyes: Pupils are equal, round, and reactive to light. EOM are normal. Right eye exhibits no discharge. Left eye exhibits no discharge.   Neck: Neck supple.   Pulmonary/Chest: Effort normal. No respiratory distress.   Musculoskeletal: He exhibits no edema or deformity.   RUE: 5/5.  LUE: 5/5.  RLE: 3/5.  LLE: 3/5 s/p L AKA.   Neurological: He is alert and oriented to person, place, and time. No sensory deficit. He exhibits normal muscle tone.   Skin: Skin is warm and dry.   Psychiatric: He has a normal mood and affect. His behavior is normal.   Nursing note and vitals reviewed.    Assessment/Plan:      * Critical lower limb ischemia  - pulseless L foot and Leg pain x 2-3 weeks.   - CTA reviewed - occlusion of aorto-bifem and bilateral distal bypasses. Leg not salvageable.  - Vasc sx proceed with L AKA on 3/20/19.     Impaired mobility and activities of daily living  Recommendations  -  Early mobility, hip AROM, and OOB in chair at least 3 hours per day  -  PT/OT evaluate and treat  -  Monitor for phantom limb pain and/or sensation  -  Pain management  -  Wound care and edema control  -  Monitor for and prevent skin breakdown and pressure ulcers  · Early mobility, repositioning/weight shifting every 20-30 minutes when sitting, turn patient every 2 hours, proper mattress/overlay and chair cushioning, pressure relief/heel protector boots  -  Anticontracture management  · Firm mattress, lying prone 15 minutes TID, and knee  extension while resting  -  Reviewed discharge options (IP rehab, SNF, HH therapy, and OP therapy)  -  Follow up in PM&R clinic 2-4 weeks post-op for postamputation and preprosthetic care      Recommend Inpatient Rehab.  IRF preference per patient/[employer].  Barriers to IRF admission:  Insurance approval.     Ximena Van NP  Department of Physical Medicine & Rehab   [facility]

## 2019-03-25 NOTE — SUBJECTIVE & OBJECTIVE
Medications:  Continuous Infusions:    Scheduled Meds:   amLODIPine  5 mg Oral Daily    aspirin  81 mg Oral Daily    heparin (porcine)  5,000 Units Subcutaneous Q8H    metoprolol succinate  50 mg Oral Daily     PRN Meds:bisacodyl, gabapentin, HYDROcodone-acetaminophen, HYDROcodone-acetaminophen, HYDROmorphone, labetalol, ramelteon, sodium chloride 0.9%     Objective:     Vital Signs (Most Recent):  Temp: (!) 95.4 °F (35.2 °C) (03/25/19 0446)  Pulse: 78 (03/25/19 0601)  Resp: 18 (03/25/19 0446)  BP: 137/78 (03/25/19 0601)  SpO2: 99 % (03/25/19 0446) Vital Signs (24h Range):  Temp:  [95.4 °F (35.2 °C)-98.5 °F (36.9 °C)] 95.4 °F (35.2 °C)  Pulse:  [] 78  Resp:  [18-20] 18  SpO2:  [97 %-100 %] 99 %  BP: (130-177)/(78-96) 137/78         Physical Exam   Constitutional: He appears well-developed and well-nourished.   HENT:   Head: Normocephalic and atraumatic.   Eyes: Conjunctivae and EOM are normal.   Neck: Normal range of motion. Neck supple.   Cardiovascular: Normal rate and regular rhythm.   Pulmonary/Chest: Effort normal. No respiratory distress.   Abdominal: Soft. He exhibits no distension.   Musculoskeletal:   L AKA incision clean/dry/intact, ischemia at medial and lateral wound edges   Nursing note and vitals reviewed.      Significant Labs:  CBC:   Recent Labs   Lab 03/25/19  0531   WBC 11.40   RBC 4.20*   HGB 11.8*   HCT 32.9*   *   MCV 78*   MCH 28.1   MCHC 35.9     CMP:   Recent Labs   Lab 03/25/19  0424   GLU 95   CALCIUM 9.7      K 4.0   CO2 27   CL 97   BUN 13   CREATININE 0.7       Significant Diagnostics:  I have reviewed all pertinent imaging results/findings within the past 24 hours.

## 2019-03-25 NOTE — PROGRESS NOTES
[facility]  Vascular Surgery  Progress Note    Patient Name: Pranay Colindres  MRN: [id number]  Admission Date: 3/20/2019  Primary Care Provider: Jenaro Torres MD    Subjective:     Interval History: No acute events overnight. Pain controlled and tolerating diet. Patient gave his rehab facility choices to  yesterday.    Post-Op Info:  Procedure(s) (LRB):  AMPUTATION, ABOVE KNEE (Left)   5 Days Post-Op       Medications:  Continuous Infusions:    Scheduled Meds:   amLODIPine  5 mg Oral Daily    aspirin  81 mg Oral Daily    heparin (porcine)  5,000 Units Subcutaneous Q8H    metoprolol succinate  50 mg Oral Daily     PRN Meds:bisacodyl, gabapentin, HYDROcodone-acetaminophen, HYDROcodone-acetaminophen, HYDROmorphone, labetalol, ramelteon, sodium chloride 0.9%     Objective:     Vital Signs (Most Recent):  Temp: (!) 95.4 °F (35.2 °C) (03/25/19 0446)  Pulse: 78 (03/25/19 0601)  Resp: 18 (03/25/19 0446)  BP: 137/78 (03/25/19 0601)  SpO2: 99 % (03/25/19 0446) Vital Signs (24h Range):  Temp:  [95.4 °F (35.2 °C)-98.5 °F (36.9 °C)] 95.4 °F (35.2 °C)  Pulse:  [] 78  Resp:  [18-20] 18  SpO2:  [97 %-100 %] 99 %  BP: (130-177)/(78-96) 137/78         Physical Exam   Constitutional: He appears well-developed and well-nourished.   HENT:   Head: Normocephalic and atraumatic.   Eyes: Conjunctivae and EOM are normal.   Neck: Normal range of motion. Neck supple.   Cardiovascular: Normal rate and regular rhythm.   Pulmonary/Chest: Effort normal. No respiratory distress.   Abdominal: Soft. He exhibits no distension.   Musculoskeletal:   L AKA incision clean/dry/intact, ischemia at medial and lateral wound edges   Nursing note and vitals reviewed.      Significant Labs:  CBC:   Recent Labs   Lab 03/25/19  0531   WBC 11.40   RBC 4.20*   HGB 11.8*   HCT 32.9*   *   MCV 78*   MCH 28.1   MCHC 35.9     CMP:   Recent Labs   Lab 03/25/19  0424   GLU 95   CALCIUM 9.7      K 4.0   CO2 27   CL  97   BUN 13   CREATININE 0.7       Significant Diagnostics:  I have reviewed all pertinent imaging results/findings within the past 24 hours.    Assessment/Plan:     * Critical lower limb ischemia  59 y.o. male with HTN, arthritis, and cerebral palsy, tobacco abuse, PVD with complex revascularization in past who presented as a transfer from [facility] with a complaint of pulseless left foot, found to have occluded aortobifem and L fem-pop bypasses. He underwent L AKA on 3/20/19. 5 Days Post-Op.    -PT/OT & PM&R  -Strict I's/O's  -Regular diet  -Home meds  -Pain control  -PPx: SCD's/IS/SubQ heparin    -Dispo: discharge pending rehab facility placement        El Davis MD  Vascular Surgery  [facility]

## 2019-03-25 NOTE — SUBJECTIVE & OBJECTIVE
Interval History 3/25/2019:  Patient is seen for follow-up rehab evaluation and recommendations: PCA pump d/c'd, pain controlled. Participating with therapy.     HPI, Past Medical, Family, and Social History remains the same as documented in the initial encounter.    Scheduled Medications:    amLODIPine  5 mg Oral Daily    aspirin  81 mg Oral Daily    atorvastatin  40 mg Oral Daily    heparin (porcine)  5,000 Units Subcutaneous Q8H    metoprolol succinate  50 mg Oral Daily       Diagnostic Results: Labs: Reviewed    PRN Medications: bisacodyl, gabapentin, HYDROcodone-acetaminophen, HYDROcodone-acetaminophen, HYDROmorphone, labetalol, ramelteon, sodium chloride 0.9%    Review of Systems   Constitutional: Negative for fatigue and fever.   HENT: Negative for trouble swallowing and voice change.    Respiratory: Negative for cough and shortness of breath.    Cardiovascular: Negative for chest pain and leg swelling.   Gastrointestinal: Negative for abdominal distention and abdominal pain.   Genitourinary: Negative for difficulty urinating and flank pain.   Musculoskeletal: Positive for gait problem. Negative for back pain.   Skin: Positive for wound (s/p L AKA). Negative for color change and rash.   Neurological: Positive for weakness and numbness. Negative for speech difficulty.   Psychiatric/Behavioral: Negative for agitation, behavioral problems and confusion.     Objective:     Vital Signs (Most Recent):  Temp: 97.3 °F (36.3 °C) (03/25/19 1129)  Pulse: 97 (03/25/19 1129)  Resp: 17 (03/25/19 1129)  BP: (!) 145/85 (03/25/19 1129)  SpO2: 97 % (03/25/19 1129)    Vital Signs (24h Range):  Temp:  [95.4 °F (35.2 °C)-98.5 °F (36.9 °C)] 97.3 °F (36.3 °C)  Pulse:  [] 97  Resp:  [16-20] 17  SpO2:  [94 %-99 %] 97 %  BP: (117-177)/(77-96) 145/85     Physical Exam   Constitutional: He is oriented to person, place, and time. He appears well-developed and well-nourished.   HENT:   Head: Normocephalic and atraumatic.    Eyes: Pupils are equal, round, and reactive to light. EOM are normal. Right eye exhibits no discharge. Left eye exhibits no discharge.   Neck: Neck supple.   Pulmonary/Chest: Effort normal. No respiratory distress.   Musculoskeletal: He exhibits no edema or deformity.   RUE: 5/5.  LUE: 5/5.  RLE: 3/5.  LLE: 3/5 s/p L AKA.   Neurological: He is alert and oriented to person, place, and time. No sensory deficit. He exhibits normal muscle tone.   Skin: Skin is warm and dry.   Psychiatric: He has a normal mood and affect. His behavior is normal.   Nursing note and vitals reviewed.    NEUROLOGICAL EXAMINATION:     MENTAL STATUS   Oriented to person, place, and time.     CRANIAL NERVES     CN III, IV, VI   Pupils are equal, round, and reactive to light.  Extraocular motions are normal.

## 2019-03-25 NOTE — PLAN OF CARE
SW following for d/c needs. Pt expected to d/c to a rehab facility.  Referrals sent to [facility] by [facility]. SW will f/u on these request.            03/25/19 1117   Post-Acute Status   Post-Acute Authorization Placement  (Rehab)   Post-Acute Placement Status Referrals Sent     Nimisha Devi, MSW, [facility]  [facility]  [phone]

## 2019-03-25 NOTE — ASSESSMENT & PLAN NOTE
59 y.o. male with HTN, arthritis, and cerebral palsy, tobacco abuse, PVD with complex revascularization in past who presented as a transfer from [place] [facility] with a complaint of pulseless left foot, found to have occluded aortobifem and L fem-pop bypasses. He underwent L AKA on 3/20/19. 5 Days Post-Op.    -PT/OT & PM&R  -Strict I's/O's  -Regular diet  -Home meds  -Pain control  -PPx: SCD's/IS/SubQ heparin    -Dispo: discharge pending rehab facility placement

## 2019-03-25 NOTE — PLAN OF CARE
Problem: Adult Inpatient Plan of Care  Goal: Plan of Care Review  Outcome: Ongoing (interventions implemented as appropriate)  Patient resting in bed comfortably. IV intact with no signs of irritation. Fall precautions maintained with no falls noted. Call light in reach bed locked and in lowest position. Non-skid socks on while out of bed. Patient instructed to call for assistance. Skin integrity maintained as patient is independent with frequent positioning. C/o pain managed with prn meds. No other complaints or concerns. Progressing towards goals. Will continue to monitor and follow plan of care.

## 2019-03-26 LAB
ANION GAP SERPL CALC-SCNC: 13 MMOL/L (ref 8–16)
BASOPHILS # BLD AUTO: 0.08 K/UL (ref 0–0.2)
BASOPHILS NFR BLD: 0.7 % (ref 0–1.9)
BUN SERPL-MCNC: 12 MG/DL (ref 6–20)
CALCIUM SERPL-MCNC: 10.2 MG/DL (ref 8.7–10.5)
CHLORIDE SERPL-SCNC: 97 MMOL/L (ref 95–110)
CO2 SERPL-SCNC: 26 MMOL/L (ref 23–29)
CREAT SERPL-MCNC: 0.8 MG/DL (ref 0.5–1.4)
DIFFERENTIAL METHOD: ABNORMAL
EOSINOPHIL # BLD AUTO: 0.5 K/UL (ref 0–0.5)
EOSINOPHIL NFR BLD: 4.4 % (ref 0–8)
ERYTHROCYTE [DISTWIDTH] IN BLOOD BY AUTOMATED COUNT: 13.3 % (ref 11.5–14.5)
EST. GFR  (AFRICAN AMERICAN): >60 ML/MIN/1.73 M^2
EST. GFR  (NON AFRICAN AMERICAN): >60 ML/MIN/1.73 M^2
GLUCOSE SERPL-MCNC: 86 MG/DL (ref 70–110)
HCT VFR BLD AUTO: 35.6 % (ref 40–54)
HGB BLD-MCNC: 12.6 G/DL (ref 14–18)
IMM GRANULOCYTES # BLD AUTO: 0.09 K/UL (ref 0–0.04)
IMM GRANULOCYTES NFR BLD AUTO: 0.8 % (ref 0–0.5)
LYMPHOCYTES # BLD AUTO: 2.2 K/UL (ref 1–4.8)
LYMPHOCYTES NFR BLD: 18 % (ref 18–48)
MAGNESIUM SERPL-MCNC: 1.9 MG/DL (ref 1.6–2.6)
MCH RBC QN AUTO: 27.6 PG (ref 27–31)
MCHC RBC AUTO-ENTMCNC: 35.4 G/DL (ref 32–36)
MCV RBC AUTO: 78 FL (ref 82–98)
MONOCYTES # BLD AUTO: 1.1 K/UL (ref 0.3–1)
MONOCYTES NFR BLD: 9.4 % (ref 4–15)
NEUTROPHILS # BLD AUTO: 8 K/UL (ref 1.8–7.7)
NEUTROPHILS NFR BLD: 66.7 % (ref 38–73)
NRBC BLD-RTO: 0 /100 WBC
PHOSPHATE SERPL-MCNC: 4 MG/DL (ref 2.7–4.5)
PLATELET # BLD AUTO: 538 K/UL (ref 150–350)
PMV BLD AUTO: 11 FL (ref 9.2–12.9)
POTASSIUM SERPL-SCNC: 4.3 MMOL/L (ref 3.5–5.1)
RBC # BLD AUTO: 4.56 M/UL (ref 4.6–6.2)
SODIUM SERPL-SCNC: 136 MMOL/L (ref 136–145)
WBC # BLD AUTO: 11.97 K/UL (ref 3.9–12.7)

## 2019-03-26 PROCEDURE — 36415 COLL VENOUS BLD VENIPUNCTURE: CPT

## 2019-03-26 PROCEDURE — 63600175 PHARM REV CODE 636 W HCPCS: Performed by: SURGERY

## 2019-03-26 PROCEDURE — 84100 ASSAY OF PHOSPHORUS: CPT

## 2019-03-26 PROCEDURE — 25000003 PHARM REV CODE 250: Performed by: STUDENT IN AN ORGANIZED HEALTH CARE EDUCATION/TRAINING PROGRAM

## 2019-03-26 PROCEDURE — 80048 BASIC METABOLIC PNL TOTAL CA: CPT

## 2019-03-26 PROCEDURE — 85025 COMPLETE CBC W/AUTO DIFF WBC: CPT

## 2019-03-26 PROCEDURE — 83735 ASSAY OF MAGNESIUM: CPT

## 2019-03-26 PROCEDURE — 97530 THERAPEUTIC ACTIVITIES: CPT

## 2019-03-26 PROCEDURE — 12000002 HC ACUTE/MED SURGE SEMI-PRIVATE ROOM

## 2019-03-26 PROCEDURE — 97110 THERAPEUTIC EXERCISES: CPT

## 2019-03-26 RX ORDER — HYDROCODONE BITARTRATE AND ACETAMINOPHEN 5; 325 MG/1; MG/1
1 TABLET ORAL EVERY 6 HOURS PRN
Qty: 30 TABLET | Refills: 0 | Status: SHIPPED | OUTPATIENT
Start: 2019-03-26 | End: 2019-04-07

## 2019-03-26 RX ORDER — GABAPENTIN 300 MG/1
300 CAPSULE ORAL 3 TIMES DAILY
Status: DISCONTINUED | OUTPATIENT
Start: 2019-03-26 | End: 2019-03-28 | Stop reason: HOSPADM

## 2019-03-26 RX ORDER — ATORVASTATIN CALCIUM 40 MG/1
40 TABLET, FILM COATED ORAL DAILY
Qty: 90 TABLET | Refills: 3 | Status: SHIPPED | OUTPATIENT
Start: 2019-03-27 | End: 2020-03-26

## 2019-03-26 RX ORDER — AMLODIPINE BESYLATE 10 MG/1
10 TABLET ORAL DAILY
Qty: 30 TABLET | Refills: 3 | Status: SHIPPED | OUTPATIENT
Start: 2019-03-27 | End: 2020-03-26

## 2019-03-26 RX ORDER — ASPIRIN 81 MG/1
81 TABLET ORAL DAILY
Qty: 30 TABLET | Refills: 11 | Status: SHIPPED | OUTPATIENT
Start: 2019-03-27 | End: 2020-03-26

## 2019-03-26 RX ORDER — AMLODIPINE BESYLATE 10 MG/1
10 TABLET ORAL DAILY
Status: DISCONTINUED | OUTPATIENT
Start: 2019-03-26 | End: 2019-03-28 | Stop reason: HOSPADM

## 2019-03-26 RX ADMIN — HYDROCODONE BITARTRATE AND ACETAMINOPHEN 1 TABLET: 10; 325 TABLET ORAL at 01:03

## 2019-03-26 RX ADMIN — ATORVASTATIN CALCIUM 40 MG: 10 TABLET, FILM COATED ORAL at 08:03

## 2019-03-26 RX ADMIN — HYDROCODONE BITARTRATE AND ACETAMINOPHEN 1 TABLET: 10; 325 TABLET ORAL at 06:03

## 2019-03-26 RX ADMIN — HEPARIN SODIUM 5000 UNITS: 5000 INJECTION, SOLUTION INTRAVENOUS; SUBCUTANEOUS at 05:03

## 2019-03-26 RX ADMIN — HYDROMORPHONE HYDROCHLORIDE 0.5 MG: 1 INJECTION, SOLUTION INTRAMUSCULAR; INTRAVENOUS; SUBCUTANEOUS at 08:03

## 2019-03-26 RX ADMIN — AMLODIPINE BESYLATE 10 MG: 10 TABLET ORAL at 08:03

## 2019-03-26 RX ADMIN — HYDROMORPHONE HYDROCHLORIDE 0.5 MG: 1 INJECTION, SOLUTION INTRAMUSCULAR; INTRAVENOUS; SUBCUTANEOUS at 05:03

## 2019-03-26 RX ADMIN — HYDROMORPHONE HYDROCHLORIDE 0.5 MG: 1 INJECTION, SOLUTION INTRAMUSCULAR; INTRAVENOUS; SUBCUTANEOUS at 04:03

## 2019-03-26 RX ADMIN — GABAPENTIN 300 MG: 300 CAPSULE ORAL at 08:03

## 2019-03-26 RX ADMIN — APIXABAN 2.5 MG: 2.5 TABLET, FILM COATED ORAL at 08:03

## 2019-03-26 RX ADMIN — HYDROMORPHONE HYDROCHLORIDE 0.5 MG: 1 INJECTION, SOLUTION INTRAMUSCULAR; INTRAVENOUS; SUBCUTANEOUS at 11:03

## 2019-03-26 RX ADMIN — LABETALOL HCL IV SOLN PREFILLED SYRINGE 20 MG/4ML (5 MG/ML) 10 MG: 20/4 SOLUTION PREFILLED SYRINGE at 05:03

## 2019-03-26 RX ADMIN — METOPROLOL SUCCINATE 50 MG: 50 TABLET, EXTENDED RELEASE ORAL at 08:03

## 2019-03-26 RX ADMIN — GABAPENTIN 300 MG: 300 CAPSULE ORAL at 02:03

## 2019-03-26 RX ADMIN — HEPARIN SODIUM 5000 UNITS: 5000 INJECTION, SOLUTION INTRAVENOUS; SUBCUTANEOUS at 01:03

## 2019-03-26 RX ADMIN — ASPIRIN 81 MG: 81 TABLET, COATED ORAL at 08:03

## 2019-03-26 NOTE — SUBJECTIVE & OBJECTIVE
Medications:  Continuous Infusions:    Scheduled Meds:   amLODIPine  5 mg Oral Daily    aspirin  81 mg Oral Daily    atorvastatin  40 mg Oral Daily    heparin (porcine)  5,000 Units Subcutaneous Q8H    metoprolol succinate  50 mg Oral Daily     PRN Meds:bisacodyl, gabapentin, HYDROcodone-acetaminophen, HYDROcodone-acetaminophen, HYDROmorphone, labetalol, ramelteon, sodium chloride 0.9%     Objective:     Vital Signs (Most Recent):  Temp: 97.9 °F (36.6 °C) (03/26/19 0522)  Pulse: 96 (03/26/19 0701)  Resp: 16 (03/26/19 0522)  BP: (!) 171/94 (03/26/19 0522)  SpO2: 98 % (03/26/19 0522) Vital Signs (24h Range):  Temp:  [97.2 °F (36.2 °C)-97.9 °F (36.6 °C)] 97.9 °F (36.6 °C)  Pulse:  [] 96  Resp:  [16-18] 16  SpO2:  [94 %-98 %] 98 %  BP: (117-172)/(77-97) 171/94         Physical Exam   Constitutional: He appears well-developed and well-nourished.   HENT:   Head: Normocephalic and atraumatic.   Eyes: Conjunctivae and EOM are normal.   Neck: Normal range of motion. Neck supple.   Cardiovascular: Normal rate and regular rhythm.   Pulmonary/Chest: Effort normal. No respiratory distress.   Abdominal: Soft. He exhibits no distension.   Musculoskeletal:   L AKA incision clean/dry/intact, ischemia at medial and lateral wound edges  RLE nonpalpable DP and PT pulses, nondopplerable DP and PT signals, RLE is cool to the touch   Nursing note and vitals reviewed.      Significant Labs:  CBC:   Recent Labs   Lab 03/25/19  0531   WBC 11.40   RBC 4.20*   HGB 11.8*   HCT 32.9*   *   MCV 78*   MCH 28.1   MCHC 35.9     CMP:   Recent Labs   Lab 03/25/19  0424   GLU 95   CALCIUM 9.7      K 4.0   CO2 27   CL 97   BUN 13   CREATININE 0.7       Significant Diagnostics:  I have reviewed all pertinent imaging results/findings within the past 24 hours.

## 2019-03-26 NOTE — ASSESSMENT & PLAN NOTE
59 y.o. male with HTN, arthritis, and cerebral palsy, tobacco abuse, PVD with complex revascularization in past who presented as a transfer from [place] [facility] with a complaint of pulseless left foot, found to have occluded aortobifem and L fem-pop bypasses. He underwent L AKA on 3/20/19. 6 Days Post-Op.    -PT/OT & PM&R  -Strict I's/O's  -Regular diet  -Home meds  -Pain control  -PPx: SCD's/IS/SubQ heparin    -Dispo: discharge pending rehab facility placement

## 2019-03-26 NOTE — PROGRESS NOTES
[facility]  Vascular Surgery  Progress Note    Patient Name: Pranay Colindres  MRN: [id number]  Admission Date: 3/20/2019  Primary Care Provider: Jenaro Torres MD    Subjective:     Interval History: Experienced ischemic rest pain to RLE yesterday and overnight. Pain improving. AFVSS.    Post-Op Info:  Procedure(s) (LRB):  AMPUTATION, ABOVE KNEE (Left)   6 Days Post-Op       Medications:  Continuous Infusions:    Scheduled Meds:   amLODIPine  5 mg Oral Daily    aspirin  81 mg Oral Daily    atorvastatin  40 mg Oral Daily    heparin (porcine)  5,000 Units Subcutaneous Q8H    metoprolol succinate  50 mg Oral Daily     PRN Meds:bisacodyl, gabapentin, HYDROcodone-acetaminophen, HYDROcodone-acetaminophen, HYDROmorphone, labetalol, ramelteon, sodium chloride 0.9%     Objective:     Vital Signs (Most Recent):  Temp: 97.9 °F (36.6 °C) (03/26/19 0522)  Pulse: 96 (03/26/19 0701)  Resp: 16 (03/26/19 0522)  BP: (!) 171/94 (03/26/19 0522)  SpO2: 98 % (03/26/19 0522) Vital Signs (24h Range):  Temp:  [97.2 °F (36.2 °C)-97.9 °F (36.6 °C)] 97.9 °F (36.6 °C)  Pulse:  [] 96  Resp:  [16-18] 16  SpO2:  [94 %-98 %] 98 %  BP: (117-172)/(77-97) 171/94         Physical Exam   Constitutional: He appears well-developed and well-nourished.   HENT:   Head: Normocephalic and atraumatic.   Eyes: Conjunctivae and EOM are normal.   Neck: Normal range of motion. Neck supple.   Cardiovascular: Normal rate and regular rhythm.   Pulmonary/Chest: Effort normal. No respiratory distress.   Abdominal: Soft. He exhibits no distension.   Musculoskeletal:   L AKA incision clean/dry/intact, ischemia at medial and lateral wound edges  RLE nonpalpable DP and PT pulses, nondopplerable DP and PT signals, RLE is cool to the touch   Nursing note and vitals reviewed.      Significant Labs:  CBC:   Recent Labs   Lab 03/25/19  0531   WBC 11.40   RBC 4.20*   HGB 11.8*   HCT 32.9*   *   MCV 78*   MCH 28.1   MCHC 35.9     CMP:    Recent Labs   Lab 03/25/19  0424   GLU 95   CALCIUM 9.7      K 4.0   CO2 27   CL 97   BUN 13   CREATININE 0.7       Significant Diagnostics:  I have reviewed all pertinent imaging results/findings within the past 24 hours.    Assessment/Plan:     * Critical lower limb ischemia  59 y.o. male with HTN, arthritis, and cerebral palsy, tobacco abuse, PVD with complex revascularization in past who presented as a transfer from [place] ED with a complaint of pulseless left foot, found to have occluded aortobifem and L fem-pop bypasses. He underwent L AKA on 3/20/19. 6 Days Post-Op.    -PT/OT & PM&R  -Strict I's/O's  -Regular diet  -Home meds  -Pain control  -PPx: SCD's/IS/SubQ heparin    -Dispo: discharge pending rehab facility placement        El Davis MD  Vascular Surgery  [facility]

## 2019-03-26 NOTE — PLAN OF CARE
OK for patient to go to skilled facility for rehab, physical therapy, and occupational therapy.    LUCAS Davis MD  General Surgery, PGY-1  Pager: [phone]

## 2019-03-26 NOTE — PLAN OF CARE
03/26/19 1047   Post-Acute Status   Post-Acute Authorization Placement  (Rehab)   Post-Acute Placement Status Referrals Sent  (Awaiting Auth)     NILAY spoke with Chely at [facility] (pt insurance provider). Chely informed NILAY that pt insurance will be approving pt rehab stay for [facility] as [facility] is a Skilled Facility.  NILAY will inform pt.     [facility]  [facility] is not in pt network.      NILAY contacted [facility] about pt referral.  NILAY requested that the facility resubmit the request as a rehab.  Nilay spoke with Jacquelyn who informed SW that she would contact pt insurance about this.      NILAY spoke with Jacquelyn at [facility] who informed SW that the MD needed to place a note stating that the pt could rehab in a skilled facility.  NILAY contacted MD about this.     NILAY contacted pt insurance (Chely) about MD note.  No answer.  NILAY will try again at a later time.     Nimisha Devi, MSW, LCSW  [facility]  [phone]

## 2019-03-26 NOTE — PLAN OF CARE
Problem: Adult Inpatient Plan of Care  Goal: Plan of Care Review  Outcome: Ongoing (interventions implemented as appropriate)  Patient resting in bed comfortably. IV intact with no signs of irritation. Fall precautions maintained with no falls noted. Call light in reach bed locked and in lowest position. Non-skid socks on while out of bed. Patient instructed to call for assistance. Skin integrity maintained as patient is independent with frequent positioning. C/o pain mildly managed with prn meds. Patient's main complaint is nerve pain to RLE. MD aware, Gabapentin changed to TID. No other complaints or concerns. Progressing towards goals. Will continue to monitor and follow plan of care.

## 2019-03-26 NOTE — PT/OT/SLP PROGRESS
Physical Therapy Treatment    Patient Name:  Pranay Colindres   MRN:  [id number]    Recommendations:     Discharge Recommendations:  rehabilitation facility   Discharge Equipment Recommendations: wheelchair, manual   Barriers to discharge: Inaccessible home and Decreased caregiver support    Assessment:     Pranay Colindres is a 59 y.o. male admitted with a medical diagnosis of Critical lower limb ischemia.  He presents with the following impairments/functional limitations:  weakness, gait instability, impaired balance, pain, impaired functional mobilty, decreased lower extremity function, decreased ROM, impaired self care skills .Mr. Colindres required increase assistance with transfers. Limited progression with OOB mobility secondary to R heel pain.  Mr. Colindres would benefit from further IP therapy.     Rehab Prognosis: Fair; patient would benefit from acute skilled PT services to address these deficits and reach maximum level of function.    Recent Surgery: Procedure(s) (LRB):  AMPUTATION, ABOVE KNEE (Left) 6 Days Post-Op    Plan:     During this hospitalization, patient to be seen 4 x/week to address the identified rehab impairments via gait training, therapeutic activities, therapeutic exercises, neuromuscular re-education and progress toward the following goals:    · Plan of Care Expires:  04/22/19    Subjective     Chief Complaint: R heel pain  Patient/Family Comments/goals: I can't walk with this heel pain  Pain/Comfort:  · Pain Rating 1: 8/10  · Location - Side 1: Right  · Location 1: heel      Objective:     Communicated with NSG prior to session.  Patient found supine with telemetry upon PT entry to room.     General Precautions: Standard, fall   Orthopedic Precautions:LLE non weight bearing   Braces:       Functional Mobility:  · Bed Mobility:     · Rolling Right: stand by assistance  · Supine to Sit: stand by assistance  · Transfers:     · Sit to Stand:  minimum assistance with rolling walker from bed and BS  chair.  Patient required VC's for technique. Noted slow movements.  · Bed to Chair: minimum assistance and moderate assistance with  rolling walker  using  Stand Pivot. First trial, patient required mod assistance from bed to BS chair secondary to decrease WB'ing through R heel.  Second and third trial from BS chair <> bed patient required min assistance.  Continue to note decrease WB'ing through R LE, but instructed to increase WB'ing through B UE's. Noted slow movements with all transfers.      AM-PAC 6 CLICK MOBILITY  Turning over in bed (including adjusting bedclothes, sheets and blankets)?: 4  Sitting down on and standing up from a chair with arms (e.g., wheelchair, bedside commode, etc.): 3  Moving from lying on back to sitting on the side of the bed?: 4  Moving to and from a bed to a chair (including a wheelchair)?: 3  Need to walk in hospital room?: 3  Climbing 3-5 steps with a railing?: 1  Basic Mobility Total Score: 18       Therapeutic Activities and Exercises:   Performed GS, supine Hip ABD/ADD, hip flexion, supine hip extension, side-lying hip ABD and hip extension x20-30 reps.  Required assistance for proper position with side-lying exercises.  Educated on benefits of OOB mobility    Patient left up in chair with all lines intact, call button in reach and NSG notified..    GOALS:   Multidisciplinary Problems     Physical Therapy Goals        Problem: Physical Therapy Goal    Goal Priority Disciplines Outcome Goal Variances Interventions   Physical Therapy Goal     PT, PT/OT Ongoing (interventions implemented as appropriate)     Description:  Goals to be met by: 3/29/19    Patient will increase functional independence with mobility by performin. Supine to sit with CGA met  2. Sit to supine with CGA  3. Sit to stand transfer with CGA using appropriate AD  4. Bed to chair transfer with CGA using appropriate AD  5. Gait  x 100 feet with min (A) using appropriate AD.   6. Lower extremity exercise  program x20-30 reps per handout, with assistance as needed.                         Time Tracking:     PT Received On: 03/26/19  PT Start Time: 0945     PT Stop Time: 1010  PT Total Time (min): 25 min     Billable Minutes: Therapeutic Activity 15 and Therapeutic Exercise 10    Treatment Type: Treatment  PT/PTA: PTA     PTA Visit Number: 2     Win Fraser II, PTA  03/26/2019

## 2019-03-27 LAB
ANION GAP SERPL CALC-SCNC: 10 MMOL/L (ref 8–16)
BASOPHILS # BLD AUTO: 0.07 K/UL (ref 0–0.2)
BASOPHILS NFR BLD: 0.5 % (ref 0–1.9)
BUN SERPL-MCNC: 15 MG/DL (ref 6–20)
CALCIUM SERPL-MCNC: 10 MG/DL (ref 8.7–10.5)
CHLORIDE SERPL-SCNC: 97 MMOL/L (ref 95–110)
CO2 SERPL-SCNC: 28 MMOL/L (ref 23–29)
CREAT SERPL-MCNC: 0.8 MG/DL (ref 0.5–1.4)
DIFFERENTIAL METHOD: ABNORMAL
EOSINOPHIL # BLD AUTO: 0.5 K/UL (ref 0–0.5)
EOSINOPHIL NFR BLD: 4.2 % (ref 0–8)
ERYTHROCYTE [DISTWIDTH] IN BLOOD BY AUTOMATED COUNT: 13.2 % (ref 11.5–14.5)
EST. GFR  (AFRICAN AMERICAN): >60 ML/MIN/1.73 M^2
EST. GFR  (NON AFRICAN AMERICAN): >60 ML/MIN/1.73 M^2
GLUCOSE SERPL-MCNC: 100 MG/DL (ref 70–110)
HCT VFR BLD AUTO: 35 % (ref 40–54)
HGB BLD-MCNC: 12.5 G/DL (ref 14–18)
IMM GRANULOCYTES # BLD AUTO: 0.15 K/UL (ref 0–0.04)
IMM GRANULOCYTES NFR BLD AUTO: 1.2 % (ref 0–0.5)
LYMPHOCYTES # BLD AUTO: 2.4 K/UL (ref 1–4.8)
LYMPHOCYTES NFR BLD: 18.9 % (ref 18–48)
MAGNESIUM SERPL-MCNC: 2 MG/DL (ref 1.6–2.6)
MCH RBC QN AUTO: 27.8 PG (ref 27–31)
MCHC RBC AUTO-ENTMCNC: 35.7 G/DL (ref 32–36)
MCV RBC AUTO: 78 FL (ref 82–98)
MONOCYTES # BLD AUTO: 1.3 K/UL (ref 0.3–1)
MONOCYTES NFR BLD: 9.8 % (ref 4–15)
NEUTROPHILS # BLD AUTO: 8.5 K/UL (ref 1.8–7.7)
NEUTROPHILS NFR BLD: 65.4 % (ref 38–73)
NRBC BLD-RTO: 0 /100 WBC
PHOSPHATE SERPL-MCNC: 4.1 MG/DL (ref 2.7–4.5)
PLATELET # BLD AUTO: 564 K/UL (ref 150–350)
PMV BLD AUTO: 10.5 FL (ref 9.2–12.9)
POTASSIUM SERPL-SCNC: 4.4 MMOL/L (ref 3.5–5.1)
RBC # BLD AUTO: 4.49 M/UL (ref 4.6–6.2)
SODIUM SERPL-SCNC: 135 MMOL/L (ref 136–145)
WBC # BLD AUTO: 12.92 K/UL (ref 3.9–12.7)

## 2019-03-27 PROCEDURE — 12000002 HC ACUTE/MED SURGE SEMI-PRIVATE ROOM

## 2019-03-27 PROCEDURE — 63600175 PHARM REV CODE 636 W HCPCS: Performed by: SURGERY

## 2019-03-27 PROCEDURE — 36415 COLL VENOUS BLD VENIPUNCTURE: CPT

## 2019-03-27 PROCEDURE — 84100 ASSAY OF PHOSPHORUS: CPT

## 2019-03-27 PROCEDURE — 85025 COMPLETE CBC W/AUTO DIFF WBC: CPT

## 2019-03-27 PROCEDURE — 25000003 PHARM REV CODE 250: Performed by: STUDENT IN AN ORGANIZED HEALTH CARE EDUCATION/TRAINING PROGRAM

## 2019-03-27 PROCEDURE — 83735 ASSAY OF MAGNESIUM: CPT

## 2019-03-27 PROCEDURE — 94761 N-INVAS EAR/PLS OXIMETRY MLT: CPT

## 2019-03-27 PROCEDURE — 80048 BASIC METABOLIC PNL TOTAL CA: CPT

## 2019-03-27 PROCEDURE — 97535 SELF CARE MNGMENT TRAINING: CPT

## 2019-03-27 RX ADMIN — APIXABAN 2.5 MG: 2.5 TABLET, FILM COATED ORAL at 08:03

## 2019-03-27 RX ADMIN — HYDROCODONE BITARTRATE AND ACETAMINOPHEN 1 TABLET: 10; 325 TABLET ORAL at 12:03

## 2019-03-27 RX ADMIN — METOPROLOL SUCCINATE 50 MG: 50 TABLET, EXTENDED RELEASE ORAL at 08:03

## 2019-03-27 RX ADMIN — AMLODIPINE BESYLATE 10 MG: 10 TABLET ORAL at 08:03

## 2019-03-27 RX ADMIN — GABAPENTIN 300 MG: 300 CAPSULE ORAL at 02:03

## 2019-03-27 RX ADMIN — HYDROCODONE BITARTRATE AND ACETAMINOPHEN 1 TABLET: 10; 325 TABLET ORAL at 06:03

## 2019-03-27 RX ADMIN — RAMELTEON 8 MG: 8 TABLET, FILM COATED ORAL at 09:03

## 2019-03-27 RX ADMIN — ASPIRIN 81 MG: 81 TABLET, COATED ORAL at 08:03

## 2019-03-27 RX ADMIN — ATORVASTATIN CALCIUM 40 MG: 10 TABLET, FILM COATED ORAL at 08:03

## 2019-03-27 RX ADMIN — GABAPENTIN 300 MG: 300 CAPSULE ORAL at 09:03

## 2019-03-27 RX ADMIN — HYDROCODONE BITARTRATE AND ACETAMINOPHEN 1 TABLET: 10; 325 TABLET ORAL at 09:03

## 2019-03-27 RX ADMIN — APIXABAN 2.5 MG: 2.5 TABLET, FILM COATED ORAL at 09:03

## 2019-03-27 RX ADMIN — GABAPENTIN 300 MG: 300 CAPSULE ORAL at 08:03

## 2019-03-27 RX ADMIN — HYDROMORPHONE HYDROCHLORIDE 0.5 MG: 1 INJECTION, SOLUTION INTRAMUSCULAR; INTRAVENOUS; SUBCUTANEOUS at 02:03

## 2019-03-27 NOTE — PLAN OF CARE
Problem: Occupational Therapy Goal  Goal: Occupational Therapy Goal  Goals to be met by: 3/29/2019     Patient will increase functional independence with ADLs by performing:    UE Dressing with Supervision. MET 3/27  LE Dressing with Minimal Assistance.  Grooming while EOB with Stand-by Assistance.  Toileting from toilet with Minimal Assistance for hygiene and clothing management.   Toilet transfer to toilet with Stand-by Assistance.      Outcome: Ongoing (interventions implemented as appropriate)  Pt progressing well towards goals but with limited endurance and c/o pain in achilles tendon region. Cont OT POC

## 2019-03-27 NOTE — ASSESSMENT & PLAN NOTE
59 y.o. male with HTN, arthritis, and cerebral palsy, tobacco abuse, PVD with complex revascularization in past who presented as a transfer from [place] ED with a complaint of pulseless left foot, found to have occluded aortobifem and L fem-pop bypasses. He underwent L AKA on 3/20/19. 7 Days Post-Op.    -PT/OT & PM&R  -Strict I's/O's  -Regular diet  -Home meds  -Pain control    -Dispo: discharge to rehab facility today

## 2019-03-27 NOTE — PROGRESS NOTES
[facility]  Vascular Surgery  Progress Note    Patient Name: Pranay Colindres  MRN: [id number]  Admission Date: 3/20/2019  Primary Care Provider: Jenaro Torres MD    Subjective:     Interval History: No acute events overnight.    Post-Op Info:  Procedure(s) (LRB):  AMPUTATION, ABOVE KNEE (Left)   7 Days Post-Op       Medications:  Continuous Infusions:    Scheduled Meds:   amLODIPine  10 mg Oral Daily    apixaban  2.5 mg Oral BID    aspirin  81 mg Oral Daily    atorvastatin  40 mg Oral Daily    gabapentin  300 mg Oral TID    metoprolol succinate  50 mg Oral Daily     PRN Meds:bisacodyl, HYDROcodone-acetaminophen, HYDROcodone-acetaminophen, ramelteon, sodium chloride 0.9%     Objective:     Vital Signs (Most Recent):  Temp: 97.5 °F (36.4 °C) (03/27/19 1141)  Pulse: 104 (03/27/19 1141)  Resp: 18 (03/27/19 1141)  BP: (!) 171/94 (03/27/19 1141)  SpO2: 97 % (03/27/19 1141) Vital Signs (24h Range):  Temp:  [96.2 °F (35.7 °C)-98.2 °F (36.8 °C)] 97.5 °F (36.4 °C)  Pulse:  [] 104  Resp:  [18-20] 18  SpO2:  [95 %-99 %] 97 %  BP: (141-185)/() 171/94     Date 03/27/19 0700 - 03/28/19 0659   Shift 3935-7199 0191-8835 2720-5422 24 Hour Total   INTAKE   Shift Total(mL/kg)       OUTPUT   Urine(mL/kg/hr) 400   400   Shift Total(mL/kg) 400(6.1)   400(6.1)   Weight (kg) 65.8 65.8 65.8 65.8       Physical Exam   Constitutional: He appears well-developed and well-nourished.   HENT:   Head: Normocephalic and atraumatic.   Eyes: Conjunctivae and EOM are normal.   Neck: Normal range of motion. Neck supple.   Cardiovascular: Normal rate and regular rhythm.   Pulmonary/Chest: Effort normal. No respiratory distress.   Abdominal: Soft. He exhibits no distension.   Musculoskeletal:   L AKA incision clean/dry/intact, ischemia at medial and lateral wound edges  RLE nonpalpable DP and PT pulses, nondopplerable DP and PT signals, RLE is cool to the touch   Nursing note and vitals reviewed.      Significant  Labs:  CBC:   Recent Labs   Lab 03/27/19  0347   WBC 12.92*   RBC 4.49*   HGB 12.5*   HCT 35.0*   *   MCV 78*   MCH 27.8   MCHC 35.7     CMP:   Recent Labs   Lab 03/27/19  0347      CALCIUM 10.0   *   K 4.4   CO2 28   CL 97   BUN 15   CREATININE 0.8       Significant Diagnostics:  I have reviewed all pertinent imaging results/findings within the past 24 hours.    Assessment/Plan:     * Critical lower limb ischemia  59 y.o. male with HTN, arthritis, and cerebral palsy, tobacco abuse, PVD with complex revascularization in past who presented as a transfer from [place] ED with a complaint of pulseless left foot, found to have occluded aortobifem and L fem-pop bypasses. He underwent L AKA on 3/20/19. 7 Days Post-Op.    -PT/OT & PM&R  -Strict I's/O's  -Regular diet  -Home meds  -Pain control    -Dispo: discharge to rehab facility today        El Davis MD  Vascular Surgery  [facility]

## 2019-03-27 NOTE — PLAN OF CARE
03/27/19 1014   Post-Acute Status   Post-Acute Authorization Placement  ([facility])   Post-Acute Placement Status Referrals Sent  (Awaiting Insurance auth)       Pt to transfer to [facility] when insurance auth is received.  Nilay will arrange transportation via [facility] when appropriate.          10:40 AM  NILAY contacted Chely Duron [phone] about pt auth.  No answer. Nilay left a messagae for a return call.  NILAY will continue to follow.       11:50 AM  NILAY received a message from Jacquelyn ([facility]) via [employer].  Insurance auth not received.     2:31 PM  NILAY spoke with Chely with [employer] [phone] abou pt referral.  Chely advised NILAY that she was continuing to work on this pt request and would contact the  when auth was approved.  Chely informed NILAY that all pt hospital days were in and approved.       Nimisha Devi, MSW, [facility]  [facility]  [id number]

## 2019-03-27 NOTE — PT/OT/SLP PROGRESS
Occupational Therapy   Treatment    Name: Pranay Colindres  MRN: [id number]  Admitting Diagnosis:  Critical lower limb ischemia  7 Days Post-Op    Recommendations:     Discharge Recommendations: rehabilitation facility  Discharge Equipment Recommendations:  wheelchair, manual(Hip Kit; w/c with removeable armrests and elevating leg rests)  Barriers to discharge:       Assessment:     Pranay Colindres is a 59 y.o. male with a medical diagnosis of Critical lower limb ischemia.  He presents with deconditioning and decreased endurance. Performance deficits affecting function are weakness, impaired functional mobilty, impaired endurance, gait instability, decreased lower extremity function, impaired balance, orthopedic precautions, pain, decreased ROM.     Rehab Prognosis:  Good; patient would benefit from acute skilled OT services to address these deficits and reach maximum level of function.       Plan:     Patient to be seen 4 x/week to address the above listed problems via self-care/home management, therapeutic activities, therapeutic exercises  · Plan of Care Expires: 04/21/19  · Plan of Care Reviewed with: patient    Subjective     Pain/Comfort:  · Pain Rating 1: (did not rate)  · Location - Side 1: Left  · Location 1: leg  · Pain Addressed 1: Reposition, Distraction, Cessation of Activity  · Pain Rating Post-Intervention 1: (dis not rate)  · Pain Rating 2: (did not rate)  · Location - Side 2: Right  · Location - Orientation 2: generalized  · Location 2: heel(achilles tendon region)  · Pain Addressed 2: Reposition, Distraction, Cessation of Activity  · Pain Rating Post-Intervention 2: (did not rate)    Objective:     Communicated with: nsg prior to session.  Patient found HOB elevated with telemetry, peripheral IV upon OT entry to room.    General Precautions: Standard, fall   Orthopedic Precautions:LLE non weight bearing   Braces: N/A     Occupational Performance:     Bed Mobility:    · Patient completed  Rolling/Turning to Right with stand by assistance  · Patient completed Scooting/Bridging with stand by assistance  · Patient completed Supine to Sit with stand by assistance  · Patient completed Sit to Supine with stand by assistance     Functional Mobility/Transfers:  · Patient completed Sit <> Stand Transfer with minimum assistance  with  rolling walker   · Patient completed Bed <> Chair Transfer using Step Transfer technique with minimum assistance with rolling walker  · Patient completed Toilet Transfer to BSC and toilet in bathroom Step Transfer technique with minimum assistance with  rolling walker  Functional Mobility: Pt with fair to fair- dynamic seated and standing balance. Pt required increased rest breaks x2 to complete toilet t/f    Activities of Daily Living:  · Grooming: setup A to brush teeth seated on [facility]  · Toileting: minimum assistance for clothing mgmt and hygiene in stance at toilet      [facility] 6 Click ADL: 21    Treatment & Education:  Pt performing skills as above with increased stability in t/f's noted though pt continues with decreased endurance with c/o SOB and fatigue    Patient left up in chair with all lines intact, call button in reach and nsg notifiedEducation:      GOALS:   Multidisciplinary Problems     Occupational Therapy Goals        Problem: Occupational Therapy Goal    Goal Priority Disciplines Outcome Interventions   Occupational Therapy Goal     OT, PT/OT Ongoing (interventions implemented as appropriate)    Description:  Goals to be met by: 3/29/2019     Patient will increase functional independence with ADLs by performing:    UE Dressing with Supervision. MET 3/27  LE Dressing with Minimal Assistance.  Grooming while EOB with Stand-by Assistance.  Toileting from toilet with Minimal Assistance for hygiene and clothing management.   Toilet transfer to toilet with Stand-by Assistance.                       Time Tracking:     OT Date of Treatment: 03/27/19  OT Start Time:  1005  OT Stop Time: 1053  OT Total Time (min): 48 min    Billable Minutes:Self Care/Home Management 23  Therapeutic Activity 15    Zee Wallace OT  3/27/2019

## 2019-03-27 NOTE — SUBJECTIVE & OBJECTIVE
Medications:  Continuous Infusions:    Scheduled Meds:   amLODIPine  10 mg Oral Daily    apixaban  2.5 mg Oral BID    aspirin  81 mg Oral Daily    atorvastatin  40 mg Oral Daily    gabapentin  300 mg Oral TID    metoprolol succinate  50 mg Oral Daily     PRN Meds:bisacodyl, HYDROcodone-acetaminophen, HYDROcodone-acetaminophen, ramelteon, sodium chloride 0.9%     Objective:     Vital Signs (Most Recent):  Temp: 97.5 °F (36.4 °C) (03/27/19 1141)  Pulse: 104 (03/27/19 1141)  Resp: 18 (03/27/19 1141)  BP: (!) 171/94 (03/27/19 1141)  SpO2: 97 % (03/27/19 1141) Vital Signs (24h Range):  Temp:  [96.2 °F (35.7 °C)-98.2 °F (36.8 °C)] 97.5 °F (36.4 °C)  Pulse:  [] 104  Resp:  [18-20] 18  SpO2:  [95 %-99 %] 97 %  BP: (141-185)/() 171/94     Date 03/27/19 0700 - 03/28/19 0659   Shift 8370-1607 8711-4320 5024-0736 24 Hour Total   INTAKE   Shift Total(mL/kg)       OUTPUT   Urine(mL/kg/hr) 400   400   Shift Total(mL/kg) 400(6.1)   400(6.1)   Weight (kg) 65.8 65.8 65.8 65.8       Physical Exam   Constitutional: He appears well-developed and well-nourished.   HENT:   Head: Normocephalic and atraumatic.   Eyes: Conjunctivae and EOM are normal.   Neck: Normal range of motion. Neck supple.   Cardiovascular: Normal rate and regular rhythm.   Pulmonary/Chest: Effort normal. No respiratory distress.   Abdominal: Soft. He exhibits no distension.   Musculoskeletal:   L AKA incision clean/dry/intact, ischemia at medial and lateral wound edges  RLE nonpalpable DP and PT pulses, nondopplerable DP and PT signals, RLE is cool to the touch   Nursing note and vitals reviewed.      Significant Labs:  CBC:   Recent Labs   Lab 03/27/19  0347   WBC 12.92*   RBC 4.49*   HGB 12.5*   HCT 35.0*   *   MCV 78*   MCH 27.8   MCHC 35.7     CMP:   Recent Labs   Lab 03/27/19  0347      CALCIUM 10.0   *   K 4.4   CO2 28   CL 97   BUN 15   CREATININE 0.8       Significant Diagnostics:  I have reviewed all pertinent imaging  results/findings within the past 24 hours.

## 2019-03-27 NOTE — PLAN OF CARE
[facility]     Department of Hospital Medicine     [address] [place] [address]     [phone] after hours  [phone] fax       NURSING HOME ORDERS    03/28/2019    Admit to Nursing Home:  Skilled Bed                                                Diagnoses:  Active Hospital Problems    Diagnosis  POA    *Critical lower limb ischemia [I99.8]  Yes    Impaired mobility and activities of daily living [Z74.09]  Unknown    Other cerebral palsy [G80.8]  Yes    Foot pain, right [M79.671]  Yes    Encounter for screening colonoscopy [Z12.11]  Not Applicable      Resolved Hospital Problems   No resolved problems to display.       Patient is homebound due to:  Critical lower limb ischemia    Allergies:Review of patient's allergies indicates:  No Known Allergies    Vitals:     Every shift (Skilled Nursing patients)    Diet: Adult regular diet    Acitivities:    - Up in a chair each morning as tolerated   - Ambulate with assistance to bathroom   - Scheduled walks once each shift (every 8 hours)   - May use walker, cane, or self-propelled wheelchair    - Weight bearing: No weight bearing on left AKA    LABS:  Per facility protocol   CMP, CBC each week for 3 months     Nursing Precautions:      - Fall precautions per nursing home protocol   - Decubitus precautions:         -  for positioning   - Pressure reducing foam mattress   - Turn patient every two hours. Use wedge pillows to anchor patient    CONSULTS:   Physical Therapy to evaluate and treat     Occupational Therapy to evaluate and treat     Nutrition to evaluate and recommend diet    MISCELLANEOUS CARE:    Routine Skin for Bedridden Patients:  Apply moisture barrier cream to all skin folds and wet areas in perineal area daily and after baths and all bowel movements.    WOUND CARE:    All wounds to be measured with first dressing changes and every week.    Other Wounds:   Surgical            Location:   Left AKA surgical incision        Apply the following to wound:          -  Island dressing to incision.    Medications: Discontinue all previous medication orders, if any. See new list below.     Aftab Colindres   Home Medication Instructions ELIESER:[id number]    Printed on:03/27/19 6052   Medication Information                      amLODIPine (NORVASC) 10 MG tablet  Take 1 tablet (10 mg total) by mouth once daily.             apixaban (ELIQUIS) 2.5 mg Tab  Take 1 tablet (2.5 mg total) by mouth 2 (two) times daily.             aspirin (ECOTRIN) 81 MG EC tablet  Take 1 tablet (81 mg total) by mouth once daily.             atorvastatin (LIPITOR) 40 MG tablet  Take 1 tablet (40 mg total) by mouth once daily.             gabapentin (NEURONTIN) 300 MG capsule  Take 1 capsule (300 mg total) by mouth every evening.             HYDROcodone-acetaminophen (NORCO) 5-325 mg per tablet  Take 1 tablet by mouth every 6 (six) hours as needed for Pain.             latanoprost 0.005 % ophthalmic solution               metoprolol succinate (TOPROL-XL) 50 MG 24 hr tablet  Take 50 mg by mouth once daily.                                  _________________________________  El Davis MD  03/28/2019

## 2019-03-28 VITALS
RESPIRATION RATE: 18 BRPM | HEART RATE: 89 BPM | HEIGHT: 67 IN | WEIGHT: 145.06 LBS | BODY MASS INDEX: 22.77 KG/M2 | DIASTOLIC BLOOD PRESSURE: 90 MMHG | TEMPERATURE: 96 F | OXYGEN SATURATION: 99 % | SYSTOLIC BLOOD PRESSURE: 137 MMHG

## 2019-03-28 LAB
ANION GAP SERPL CALC-SCNC: 12 MMOL/L (ref 8–16)
BASOPHILS # BLD AUTO: 0.06 K/UL (ref 0–0.2)
BASOPHILS NFR BLD: 0.4 % (ref 0–1.9)
BILIRUB UR QL STRIP: NEGATIVE
BUN SERPL-MCNC: 15 MG/DL (ref 6–20)
CALCIUM SERPL-MCNC: 10.1 MG/DL (ref 8.7–10.5)
CHLORIDE SERPL-SCNC: 97 MMOL/L (ref 95–110)
CLARITY UR REFRACT.AUTO: CLEAR
CO2 SERPL-SCNC: 26 MMOL/L (ref 23–29)
COLOR UR AUTO: YELLOW
CREAT SERPL-MCNC: 0.7 MG/DL (ref 0.5–1.4)
DIFFERENTIAL METHOD: ABNORMAL
EOSINOPHIL # BLD AUTO: 0.5 K/UL (ref 0–0.5)
EOSINOPHIL NFR BLD: 3.9 % (ref 0–8)
ERYTHROCYTE [DISTWIDTH] IN BLOOD BY AUTOMATED COUNT: 13.2 % (ref 11.5–14.5)
EST. GFR  (AFRICAN AMERICAN): >60 ML/MIN/1.73 M^2
EST. GFR  (NON AFRICAN AMERICAN): >60 ML/MIN/1.73 M^2
GLUCOSE SERPL-MCNC: 99 MG/DL (ref 70–110)
GLUCOSE UR QL STRIP: NEGATIVE
HCT VFR BLD AUTO: 34.2 % (ref 40–54)
HGB BLD-MCNC: 12 G/DL (ref 14–18)
HGB UR QL STRIP: NEGATIVE
IMM GRANULOCYTES # BLD AUTO: 0.15 K/UL (ref 0–0.04)
IMM GRANULOCYTES NFR BLD AUTO: 1.1 % (ref 0–0.5)
KETONES UR QL STRIP: NEGATIVE
LEUKOCYTE ESTERASE UR QL STRIP: NEGATIVE
LYMPHOCYTES # BLD AUTO: 2.7 K/UL (ref 1–4.8)
LYMPHOCYTES NFR BLD: 19.7 % (ref 18–48)
MAGNESIUM SERPL-MCNC: 1.8 MG/DL (ref 1.6–2.6)
MCH RBC QN AUTO: 27.5 PG (ref 27–31)
MCHC RBC AUTO-ENTMCNC: 35.1 G/DL (ref 32–36)
MCV RBC AUTO: 78 FL (ref 82–98)
MONOCYTES # BLD AUTO: 1.1 K/UL (ref 0.3–1)
MONOCYTES NFR BLD: 7.9 % (ref 4–15)
NEUTROPHILS # BLD AUTO: 9.1 K/UL (ref 1.8–7.7)
NEUTROPHILS NFR BLD: 67 % (ref 38–73)
NITRITE UR QL STRIP: NEGATIVE
NRBC BLD-RTO: 0 /100 WBC
PH UR STRIP: 7 [PH] (ref 5–8)
PHOSPHATE SERPL-MCNC: 4.1 MG/DL (ref 2.7–4.5)
PLATELET # BLD AUTO: 607 K/UL (ref 150–350)
PMV BLD AUTO: 10.5 FL (ref 9.2–12.9)
POTASSIUM SERPL-SCNC: 4.1 MMOL/L (ref 3.5–5.1)
PROT UR QL STRIP: NEGATIVE
RBC # BLD AUTO: 4.37 M/UL (ref 4.6–6.2)
SODIUM SERPL-SCNC: 135 MMOL/L (ref 136–145)
SP GR UR STRIP: 1.01 (ref 1–1.03)
TB INDURATION 48 - 72 HR READ: 0 MM
URN SPEC COLLECT METH UR: NORMAL
WBC # BLD AUTO: 13.59 K/UL (ref 3.9–12.7)

## 2019-03-28 PROCEDURE — 25000003 PHARM REV CODE 250: Performed by: STUDENT IN AN ORGANIZED HEALTH CARE EDUCATION/TRAINING PROGRAM

## 2019-03-28 PROCEDURE — 97535 SELF CARE MNGMENT TRAINING: CPT

## 2019-03-28 PROCEDURE — 97110 THERAPEUTIC EXERCISES: CPT

## 2019-03-28 PROCEDURE — 81003 URINALYSIS AUTO W/O SCOPE: CPT

## 2019-03-28 PROCEDURE — 97530 THERAPEUTIC ACTIVITIES: CPT

## 2019-03-28 PROCEDURE — 36415 COLL VENOUS BLD VENIPUNCTURE: CPT

## 2019-03-28 PROCEDURE — 84100 ASSAY OF PHOSPHORUS: CPT

## 2019-03-28 PROCEDURE — 83735 ASSAY OF MAGNESIUM: CPT

## 2019-03-28 PROCEDURE — 80048 BASIC METABOLIC PNL TOTAL CA: CPT

## 2019-03-28 PROCEDURE — 85025 COMPLETE CBC W/AUTO DIFF WBC: CPT

## 2019-03-28 RX ORDER — HYDROCODONE BITARTRATE AND ACETAMINOPHEN 10; 325 MG/1; MG/1
1 TABLET ORAL ONCE
Status: COMPLETED | OUTPATIENT
Start: 2019-03-28 | End: 2019-03-28

## 2019-03-28 RX ADMIN — ATORVASTATIN CALCIUM 40 MG: 10 TABLET, FILM COATED ORAL at 09:03

## 2019-03-28 RX ADMIN — HYDROCODONE BITARTRATE AND ACETAMINOPHEN 1 TABLET: 10; 325 TABLET ORAL at 01:03

## 2019-03-28 RX ADMIN — HYDROCODONE BITARTRATE AND ACETAMINOPHEN 1 TABLET: 10; 325 TABLET ORAL at 03:03

## 2019-03-28 RX ADMIN — GABAPENTIN 300 MG: 300 CAPSULE ORAL at 03:03

## 2019-03-28 RX ADMIN — HYDROCODONE BITARTRATE AND ACETAMINOPHEN 1 TABLET: 10; 325 TABLET ORAL at 09:03

## 2019-03-28 RX ADMIN — METOPROLOL SUCCINATE 50 MG: 50 TABLET, EXTENDED RELEASE ORAL at 09:03

## 2019-03-28 RX ADMIN — AMLODIPINE BESYLATE 10 MG: 10 TABLET ORAL at 09:03

## 2019-03-28 RX ADMIN — ASPIRIN 81 MG: 81 TABLET, COATED ORAL at 09:03

## 2019-03-28 RX ADMIN — APIXABAN 2.5 MG: 2.5 TABLET, FILM COATED ORAL at 09:03

## 2019-03-28 RX ADMIN — GABAPENTIN 300 MG: 300 CAPSULE ORAL at 09:03

## 2019-03-28 NOTE — PT/OT/SLP PROGRESS
Occupational Therapy   Treatment    Name: Pranay Colindres  MRN: [id number]  Admitting Diagnosis:  Critical lower limb ischemia  8 Days Post-Op    Recommendations:     Discharge Recommendations: rehabilitation facility  Discharge Equipment Recommendations:  wheelchair, manual, walker, rolling(hip kit )  Barriers to discharge:  Inaccessible home environment    Assessment:     Pranay Colindres is a 59 y.o. male with a medical diagnosis of Critical lower limb ischemia.  He presents with the following performance deficits affecting function:  weakness, impaired endurance, impaired self care skills, impaired balance, impaired functional mobilty, gait instability, decreased lower extremity function, pain, orthopedic precautions.     Pt tolerated session well. He continues to progress well with therapy and has achieved functional outcomes this date. Pt was able to perform LBD utilizing AD to facilitate donning pants and R sock w/ Min A for balance in standing to pull over hips. Pt continues to require Min A for functional transfers providing increased balance. Despite pain in R heel, pt able to ambulate 44ft w/ one sit down rest break. Pt continues to benefit from skilled OT to address the above listed impairments.     Rehab Prognosis:  Good; patient would benefit from acute skilled OT services to address these deficits and reach maximum level of function.       Plan:     Patient to be seen 4 x/week to address the above listed problems via self-care/home management, therapeutic activities, therapeutic exercises  · Plan of Care Expires: 04/21/19  · Plan of Care Reviewed with: patient    Subjective     Pain/Comfort:  · Pain Rating 1: (did not rate)  · Location - Side 1: Right  · Location 1: (foot)  · Pain Addressed 1: Reposition, Cessation of Activity  · Pain Rating Post-Intervention 1: (did not rate)    Objective:     Communicated with: RN prior to session.  Patient found up in chair with telemetry upon OT entry to  room.    General Precautions: Standard, fall   Orthopedic Precautions:LLE non weight bearing   Braces: N/A     Occupational Performance:     Bed Mobility:    · N/A    Functional Mobility/Transfers:  · Patient completed Sit <> Stand Transfer x3 with minimum assistance  with  rolling walker for increased stability and balance.  · Functional Mobility: Pt ambulated 44 ft w/ CGA and RW; pt required one sit down rest break 2/2 pain.     Activities of Daily Living:  · Lower Body Dressing: minimum assistance in standing for increased balance to pull pants over hips. Pt managed his R sock and threaded R LE and L residual limb into pant legs w/ supervision       [facility] 6 Click ADL: 21    Treatment & Education:  - OT POC  - Importance of OOB activity to maximize recovery   - Safety w/ functional mobility; hand placement to ensure safe functional transfers  - LBD adaptive techniques w/ AD to facilitate task  - hand placement in standing when pulling up pants to promote optimal standing balance while completing task; pull up w/ R hand and keep L hand on RW    Patient left up in chair with all lines intact, call button in reach and RN notifiedEducation:      GOALS:   Multidisciplinary Problems     Occupational Therapy Goals        Problem: Occupational Therapy Goal    Goal Priority Disciplines Outcome Interventions   Occupational Therapy Goal     OT, PT/OT Ongoing (interventions implemented as appropriate)    Description:  Goals to be met by: 3/29/2019     Patient will increase functional independence with ADLs by performing:    UE Dressing with Supervision. MET 3/27  LE Dressing with Minimal Assistance. MET 3/28  Grooming while EOB with Stand-by Assistance.  Toileting from toilet with Minimal Assistance for hygiene and clothing management.   Toilet transfer to toilet with Stand-by Assistance.                        Time Tracking:     OT Date of Treatment: 03/28/19  OT Start Time: 1300  OT Stop Time: 1327  OT Total Time (min): 27  min    Billable Minutes:Self Care/Home Management 15  Therapeutic Activity 12    Mandy Sanders, OT  3/28/2019

## 2019-03-28 NOTE — ASSESSMENT & PLAN NOTE
59 y.o. male with HTN, arthritis, and cerebral palsy, tobacco abuse, PVD with complex revascularization in past who presented as a transfer from [place] ED with a complaint of pulseless left foot, found to have occluded aortobifem and L fem-pop bypasses. He underwent L AKA on 3/20/19. 8 Days Post-Op.    -PT/OT & PM&R  -Strict I's/O's  -Regular diet  -Home meds  -Pain control  -Slight bump in WBC but afebrile, will check UA since had roach catheter this admission     -Dispo: discharge to rehab facility today

## 2019-03-28 NOTE — NURSING
Discharge instructions given to patient. Verbalizes understanding. IV removed, catheter intact. Report called to nurse Valladares at [facility].

## 2019-03-28 NOTE — PLAN OF CARE
SW contacted Chely Dominguez) about pt auth. Chely stated that she would be in contact with the SW about pt auth after reviewing current clinicals.          Nimisha Devi, MSW, LCSW  [facility]  [id number]

## 2019-03-28 NOTE — PLAN OF CARE
03/28/19 1010   Discharge Reassessment   Assessment Type Discharge Planning Reassessment   Provided patient/caregiver education on the expected discharge date and the discharge plan Yes   Do you have any problems affording any of your prescribed medications? No   Discharge Plan A Rehab   Discharge Plan B Skilled Nursing Facility

## 2019-03-28 NOTE — PROGRESS NOTES
[facility]  Vascular Surgery  Progress Note    Patient Name: Pranay Colindres  MRN: [id number]  Admission Date: 3/20/2019  Primary Care Provider: Jenaro Torres MD    Subjective:     Interval History: NAEON.  Still complaining of rest pain in RLE    Post-Op Info:  Procedure(s) (LRB):  AMPUTATION, ABOVE KNEE (Left)   8 Days Post-Op       Medications:  Continuous Infusions:    Scheduled Meds:   amLODIPine  10 mg Oral Daily    apixaban  2.5 mg Oral BID    aspirin  81 mg Oral Daily    atorvastatin  40 mg Oral Daily    gabapentin  300 mg Oral TID    metoprolol succinate  50 mg Oral Daily     PRN Meds:bisacodyl, HYDROcodone-acetaminophen, HYDROcodone-acetaminophen, ramelteon, sodium chloride 0.9%     Objective:     Vital Signs (Most Recent):  Temp: 96 °F (35.6 °C) (03/28/19 0800)  Pulse: 89 (03/28/19 0800)  Resp: 18 (03/28/19 0800)  BP: (!) 137/90 (03/28/19 0800)  SpO2: 99 % (03/28/19 0800) Vital Signs (24h Range):  Temp:  [96 °F (35.6 °C)-98.3 °F (36.8 °C)] 96 °F (35.6 °C)  Pulse:  [] 89  Resp:  [18-20] 18  SpO2:  [96 %-99 %] 99 %  BP: (125-171)/(80-99) 137/90         Physical Exam   Constitutional: He appears well-developed and well-nourished.   HENT:   Head: Normocephalic and atraumatic.   Eyes: Conjunctivae and EOM are normal.   Neck: Normal range of motion. Neck supple.   Cardiovascular: Normal rate and regular rhythm.   Pulmonary/Chest: Effort normal. No respiratory distress.   Abdominal: Soft. He exhibits no distension.   Musculoskeletal:   L AKA incision clean/dry/intact, small area ischemia at medial and lateral wound edges which is stable  RLE nonpalpable DP and PT pulses, nondopplerable DP and PT signals, RLE is cool to the touch   Nursing note and vitals reviewed.      Significant Labs:  CBC:   Recent Labs   Lab 03/28/19  0510   WBC 13.59*   RBC 4.37*   HGB 12.0*   HCT 34.2*   *   MCV 78*   MCH 27.5   MCHC 35.1     CMP:   Recent Labs   Lab 03/28/19  0510   GLU 99    CALCIUM 10.1   *   K 4.1   CO2 26   CL 97   BUN 15   CREATININE 0.7       Significant Diagnostics:  I have reviewed all pertinent imaging results/findings within the past 24 hours.    Assessment/Plan:     * Critical lower limb ischemia  59 y.o. male with HTN, arthritis, and cerebral palsy, tobacco abuse, PVD with complex revascularization in past who presented as a transfer from [place] ED with a complaint of pulseless left foot, found to have occluded aortobifem and L fem-pop bypasses. He underwent L AKA on 3/20/19. 8 Days Post-Op.    -PT/OT & PM&R  -Strict I's/O's  -Regular diet  -Home meds  -Pain control  -Slight bump in WBC but afebrile, will check UA since had roach catheter this admission     -Dispo: discharge to rehab facility today        Camacho Pete MD  Vascular Surgery  [facility]

## 2019-03-28 NOTE — PLAN OF CARE
Pt auth received.  [facility]  SW will arrange transport via [facility] when number for report is received.      2:51 PM  SN can call report to [phone].  SW scheduled transport for 4pm.              Nimisha Devi, MSW, LCSW [facility]  [id number]

## 2019-03-28 NOTE — PLAN OF CARE
Problem: Physical Therapy Goal  Goal: Physical Therapy Goal  Goals to be met by: 19    Patient will increase functional independence with mobility by performin. Supine to sit with CGA met  2. Sit to supine with CGA  3. Sit to stand transfer with CGA using appropriate AD  4. Bed to chair transfer with CGA using appropriate AD  5. Gait  x 100 feet with min (A) using appropriate AD.   6. Lower extremity exercise program x20-30 reps per handout, with assistance as needed.        Outcome: Ongoing (interventions implemented as appropriate)  Pt's goals remain appropriate and pt will continue to benefit from skilled PT services to work towards improved functional mobility including: bed mobility, transfers, and gait.   Tanisha Bravo, PT  3/28/2019

## 2019-03-28 NOTE — PLAN OF CARE
Problem: Occupational Therapy Goal  Goal: Occupational Therapy Goal  Goals to be met by: 3/29/2019     Patient will increase functional independence with ADLs by performing:    UE Dressing with Supervision. MET 3/27  LE Dressing with Minimal Assistance. MET 3/28  Grooming while EOB with Stand-by Assistance.  Toileting from toilet with Minimal Assistance for hygiene and clothing management.   Toilet transfer to toilet with Stand-by Assistance.      Outcome: Ongoing (interventions implemented as appropriate)  Pt continues to progress well with therapy    Comments: Cont OT POC

## 2019-03-28 NOTE — PT/OT/SLP PROGRESS
"Physical Therapy Treatment    Patient Name:  Pranay Colindres   MRN:  [id number]    Recommendations:     Discharge Recommendations:  rehabilitation facility   Discharge Equipment Recommendations: wheelchair, manual   Barriers to discharge: Inaccessible home 3 [place]    Assessment:     Pranay Colindres is a 59 y.o. male admitted with a medical diagnosis of Critical lower limb ischemia.  He presents with the following impairments/functional limitations:  weakness, gait instability, pain, decreased lower extremity function, impaired functional mobilty, orthopedic precautions .    Rehab Prognosis: Good; patient would benefit from acute skilled PT services to address these deficits and reach maximum level of function.    Recent Surgery: Procedure(s) (LRB):  AMPUTATION, ABOVE KNEE (Left) 8 Days Post-Op    Plan:     During this hospitalization, patient to be seen 4 x/week to address the identified rehab impairments via gait training, therapeutic activities, therapeutic exercises, neuromuscular re-education and progress toward the following goals:    · Plan of Care Expires:  04/22/19    Subjective   "My heel just hurts too bad, I can't walk"      Pain/Comfort:  · Pain Rating 1: 10/10  · Location - Side 1: Right  · Location 1: heel  · Pain Addressed 1: Reposition, Cessation of Activity  · Pain Rating Post-Intervention 1: 10/10(pt states it takes a while for his heel to stretch out, then it is better.)      Objective:     Communicated with nurse prior to session.  Patient found supine with telemetry upon PT entry to room.     General Precautions: Standard, fall   Orthopedic Precautions:LLE non weight bearing   Braces: N/A     Functional Mobility:  · Bed Mobility:     · Supine to Sit: modified independence  · Transfers:     · Sit to Stand:  minimum assistance with rolling walker  · Gait: 3 steps to bedside chair with minimal assist with pt weight bearing on the forefoot on the R LE    AM-PAC 6 CLICK MOBILITY  Turning over in bed " (including adjusting bedclothes, sheets and blankets)?: 4  Sitting down on and standing up from a chair with arms (e.g., wheelchair, bedside commode, etc.): 3  Moving from lying on back to sitting on the side of the bed?: 4  Moving to and from a bed to a chair (including a wheelchair)?: 3  Need to walk in hospital room?: 3  Climbing 3-5 steps with a railing?: 1  Basic Mobility Total Score: 18     Therapeutic Activities and Exercises:   pt performed L LE exs in supine and sidelying x 15 reps with manual cues for proper positioning: hip abd, hip flex, hip ext    Patient left up in chair with call button in reach and nurse notified..    GOALS:   Multidisciplinary Problems     Physical Therapy Goals        Problem: Physical Therapy Goal    Goal Priority Disciplines Outcome Goal Variances Interventions   Physical Therapy Goal     PT, PT/OT Ongoing (interventions implemented as appropriate)     Description:  Goals to be met by: 19    Patient will increase functional independence with mobility by performin. Supine to sit with CGA met  2. Sit to supine with CGA  3. Sit to stand transfer with CGA using appropriate AD  4. Bed to chair transfer with CGA using appropriate AD  5. Gait  x 100 feet with min (A) using appropriate AD.   6. Lower extremity exercise program x20-30 reps per handout, with assistance as needed.                          Time Tracking:     PT Received On: 19  PT Start Time: 1044     PT Stop Time: 1059  PT Total Time (min): 15 min     Billable Minutes: Therapeutic Exercise 15    Treatment Type: Treatment  PT/PTA: PT     PTA Visit Number: 2     Tanisha Bravo, PT  2019

## 2019-03-28 NOTE — SUBJECTIVE & OBJECTIVE
Medications:  Continuous Infusions:    Scheduled Meds:   amLODIPine  10 mg Oral Daily    apixaban  2.5 mg Oral BID    aspirin  81 mg Oral Daily    atorvastatin  40 mg Oral Daily    gabapentin  300 mg Oral TID    metoprolol succinate  50 mg Oral Daily     PRN Meds:bisacodyl, HYDROcodone-acetaminophen, HYDROcodone-acetaminophen, ramelteon, sodium chloride 0.9%     Objective:     Vital Signs (Most Recent):  Temp: 96 °F (35.6 °C) (03/28/19 0800)  Pulse: 89 (03/28/19 0800)  Resp: 18 (03/28/19 0800)  BP: (!) 137/90 (03/28/19 0800)  SpO2: 99 % (03/28/19 0800) Vital Signs (24h Range):  Temp:  [96 °F (35.6 °C)-98.3 °F (36.8 °C)] 96 °F (35.6 °C)  Pulse:  [] 89  Resp:  [18-20] 18  SpO2:  [96 %-99 %] 99 %  BP: (125-171)/(80-99) 137/90         Physical Exam   Constitutional: He appears well-developed and well-nourished.   HENT:   Head: Normocephalic and atraumatic.   Eyes: Conjunctivae and EOM are normal.   Neck: Normal range of motion. Neck supple.   Cardiovascular: Normal rate and regular rhythm.   Pulmonary/Chest: Effort normal. No respiratory distress.   Abdominal: Soft. He exhibits no distension.   Musculoskeletal:   L AKA incision clean/dry/intact, small area ischemia at medial and lateral wound edges which is stable  RLE nonpalpable DP and PT pulses, nondopplerable DP and PT signals, RLE is cool to the touch   Nursing note and vitals reviewed.      Significant Labs:  CBC:   Recent Labs   Lab 03/28/19  0510   WBC 13.59*   RBC 4.37*   HGB 12.0*   HCT 34.2*   *   MCV 78*   MCH 27.5   MCHC 35.1     CMP:   Recent Labs   Lab 03/28/19  0510   GLU 99   CALCIUM 10.1   *   K 4.1   CO2 26   CL 97   BUN 15   CREATININE 0.7       Significant Diagnostics:  I have reviewed all pertinent imaging results/findings within the past 24 hours.

## 2019-03-29 NOTE — PLAN OF CARE
03/29/19 1001   Final Note   Assessment Type Final Discharge Note   Anticipated Discharge Disposition Nurse Fac [facility]

## 2019-03-29 NOTE — PT/OT/SLP DISCHARGE
Occupational Therapy Discharge Summary    Pranay Colindres  MRN: [id number]   Principal Problem: Critical lower limb ischemia      Patient Discharged from acute Occupational Therapy on 3/28/2019.  Please refer to prior OT note dated 3/28/2019 for functional status.    Assessment:      Patient appropriate for care in another setting.    Objective:     GOALS:   Multidisciplinary Problems     Occupational Therapy Goals     Not on file          Multidisciplinary Problems (Resolved)        Problem: Occupational Therapy Goal    Goal Priority Disciplines Outcome Interventions   Occupational Therapy Goal   (Resolved)     OT, PT/OT Outcome(s) achieved    Description:  Goals to be met by: 3/29/2019     Patient will increase functional independence with ADLs by performing:    UE Dressing with Supervision. MET 3/27  LE Dressing with Minimal Assistance. MET 3/28  Grooming while EOB with Stand-by Assistance.  Toileting from toilet with Minimal Assistance for hygiene and clothing management.   Toilet transfer to toilet with Stand-by Assistance.                        Reasons for Discontinuation of Therapy Services  Transfer to alternate level of care.      Plan:     Patient Discharged to: Inpatient Rehab    Mandy Sanders, OT  3/29/2019

## 2019-03-29 NOTE — DISCHARGE SUMMARY
[facility]  Vascular Surgery  Discharge Summary      Patient Name: Pranay Colindres  MRN: [id number]  Admission Date: 3/20/2019  Hospital Length of Stay: 8 days  Discharge Date and Time: 3/28/2019  3:31 PM  Attending Physician: Thalia Moreno MD   Discharging Provider: Radha Maurer MD  Primary Care Provider: Jenaro Torres MD    HPI:   59 y.o. male with HTN, arthritis, and cerebral palsy, tobacco abuse, PVD with complex revascularization in past who presented as a transfer from [place] ED with a complaint of pulseless left foot, found to have occluded aortobifem and L fem-pop bypasses. He underwent L AKA on 3/20/19.    Procedure(s) (LRB):  AMPUTATION, ABOVE KNEE (Left)     Hospital Course: Patient presented to the ED on 3/19/19. He underwent L AKA on 3/20/19. He tolerated the procedure well and was transferred to the floor from PACU following his surgery. His L AKA incision was clean, dry, and intact with small areas of ischemia at the wound edges which remained stable during his admission. He worked with PT and OT during his admission. He tolerated a diet. He had episodes of ischemic rest pain in right leg which improved. He was deemed safe for discharge to rehab on 3/28.    Consults:   Consults (From admission, onward)        Status Ordering Provider     Inpatient consult to Physical Medicine Rehab  Once     Provider:  (Not yet assigned)    Completed RADHA MAURER     Inpatient consult to Vascular Surgery  Once     Provider:  (Not yet assigned)    Completed VISH DÍAZ          Significant Diagnostic Studies: Labs:   BMP:   Recent Labs   Lab 03/28/19  0510   GLU 99   *   K 4.1   CL 97   CO2 26   BUN 15   CREATININE 0.7   CALCIUM 10.1   MG 1.8    and CBC   Recent Labs   Lab 03/28/19  0510   WBC 13.59*   HGB 12.0*   HCT 34.2*   *       Pending Diagnostic Studies:     None        Final Active Diagnoses:    Diagnosis Date Noted POA    PRINCIPAL PROBLEM:  Critical  lower limb ischemia [I99.8] 03/20/2019 Yes    Impaired mobility and activities of daily living [Z74.09] 03/21/2019 Unknown    Other cerebral palsy [G80.8] 03/20/2019 Yes    Foot pain, right [M79.671] 03/06/2019 Yes    Encounter for screening colonoscopy [Z12.11] 04/18/2018 Not Applicable      Problems Resolved During this Admission:      Discharged Condition: good    Disposition: Home or Self Care    Follow Up:  Follow-up Information     Eligio Olmos MD In 4 weeks.    Specialty:  Vascular Surgery  Why:  Post-op follow-up with staple removal.  Contact information:  [address]  [facility] [address]  [phone]                 Patient Instructions:      Diet Adult Regular     No driving until:   Order Comments: Off narcotics     Notify your health care provider if you experience any of the following:  temperature >100.4     Notify your health care provider if you experience any of the following:  persistent nausea and vomiting or diarrhea     Notify your health care provider if you experience any of the following:  severe uncontrolled pain     Notify your health care provider if you experience any of the following:  redness, tenderness, or signs of infection (pain, swelling, redness, odor or green/yellow discharge around incision site)     Notify your health care provider if you experience any of the following:  difficulty breathing or increased cough     Notify your health care provider if you experience any of the following:  severe persistent headache     Notify your health care provider if you experience any of the following:  worsening rash     Notify your health care provider if you experience any of the following:  persistent dizziness, light-headedness, or visual disturbances     Notify your health care provider if you experience any of the following:  increased confusion or weakness     Activity as tolerated     Medications:  Reconciled Home Medications:      Medication List      START taking  these medications    apixaban 2.5 mg Tab  Commonly known as:  ELIQUIS  Take 1 tablet (2.5 mg total) by mouth 2 (two) times daily.     aspirin 81 MG EC tablet  Commonly known as:  ECOTRIN  Take 1 tablet (81 mg total) by mouth once daily.     atorvastatin 40 MG tablet  Commonly known as:  LIPITOR  Take 1 tablet (40 mg total) by mouth once daily.     HYDROcodone-acetaminophen 5-325 mg per tablet  Commonly known as:  NORCO  Take 1 tablet by mouth every 6 (six) hours as needed for Pain.        CHANGE how you take these medications    amLODIPine 10 MG tablet  Commonly known as:  NORVASC  Take 1 tablet (10 mg total) by mouth once daily.  What changed:    · medication strength  · See the new instructions.        CONTINUE taking these medications    gabapentin 300 MG capsule  Commonly known as:  NEURONTIN  Take 1 capsule (300 mg total) by mouth every evening.     latanoprost 0.005 % ophthalmic solution     metoprolol succinate 50 MG 24 hr tablet  Commonly known as:  TOPROL-XL  Take 50 mg by mouth once daily.     traMADol 50 mg tablet  Commonly known as:  ULTRAM  Take 1 tablet (50 mg total) by mouth every 6 (six) hours as needed for Pain.        STOP taking these medications    metoprolol tartrate 50 MG tablet  Commonly known as:  LOPRESSOR            El Davis MD  Vascular Surgery  [facility]

## 2019-04-01 ENCOUNTER — PATIENT OUTREACH (OUTPATIENT)
Dept: ADMINISTRATIVE | Facility: CLINIC | Age: 59
End: 2019-04-01

## 2019-04-01 NOTE — PROGRESS NOTES
Physical Therapy Discharge Summary    Name: Pranay Colindres  MRN: [id number]   Principal Problem: Critical lower limb ischemia     Patient Discharged from acute Physical Therapy on 3/28/19.  Please refer to prior PT noted date on 3/28/19 for functional status.     Assessment:     Patient appropriate for care in another setting.    Objective:     GOALS:   Multidisciplinary Problems     Physical Therapy Goals     Not on file          Multidisciplinary Problems (Resolved)        Problem: Physical Therapy Goal    Goal Priority Disciplines Outcome Goal Variances Interventions   Physical Therapy Goal   (Resolved)     PT, PT/OT Outcome(s) achieved     Description:  Goals to be met by: 19    Patient will increase functional independence with mobility by performin. Supine to sit with CGA met  2. Sit to supine with CGA  3. Sit to stand transfer with CGA using appropriate AD  4. Bed to chair transfer with CGA using appropriate AD  5. Gait  x 100 feet with min (A) using appropriate AD.   6. Lower extremity exercise program x20-30 reps per handout, with assistance as needed.                      Goals partially met prior to D/C    Reasons for Discontinuation of Therapy Services  Transfer to alternate level of care.      Plan:     Patient Discharged to: Skilled Nursing Facility at [facility].    Tanisha Bravo, PT  2019

## 2019-04-02 ENCOUNTER — LAB VISIT (OUTPATIENT)
Dept: LAB | Facility: HOSPITAL | Age: 59
End: 2019-04-02
Attending: INTERNAL MEDICINE
Payer: MEDICARE

## 2019-04-02 ENCOUNTER — TELEPHONE (OUTPATIENT)
Dept: VASCULAR SURGERY | Facility: CLINIC | Age: 59
End: 2019-04-02

## 2019-04-02 DIAGNOSIS — I10 ESSENTIAL HYPERTENSION, MALIGNANT: ICD-10-CM

## 2019-04-02 DIAGNOSIS — E78.5 HYPERLIPEMIA: Primary | ICD-10-CM

## 2019-04-02 LAB
ALBUMIN SERPL BCP-MCNC: 3.6 G/DL (ref 3.5–5.2)
ALP SERPL-CCNC: 143 U/L (ref 55–135)
ALT SERPL W/O P-5'-P-CCNC: 43 U/L (ref 10–44)
ANION GAP SERPL CALC-SCNC: 12 MMOL/L (ref 8–16)
AST SERPL-CCNC: 75 U/L (ref 10–40)
BILIRUB DIRECT SERPL-MCNC: 0.1 MG/DL (ref 0.1–0.3)
BILIRUB SERPL-MCNC: 0.5 MG/DL (ref 0.1–1)
BUN SERPL-MCNC: 16 MG/DL (ref 6–20)
CALCIUM SERPL-MCNC: 9.2 MG/DL (ref 8.7–10.5)
CHLORIDE SERPL-SCNC: 99 MMOL/L (ref 95–110)
CHOLEST SERPL-MCNC: 156 MG/DL (ref 120–199)
CHOLEST/HDLC SERPL: 4.3 {RATIO} (ref 2–5)
CO2 SERPL-SCNC: 26 MMOL/L (ref 23–29)
CREAT SERPL-MCNC: 0.7 MG/DL (ref 0.5–1.4)
EST. GFR  (AFRICAN AMERICAN): >60 ML/MIN/1.73 M^2
EST. GFR  (NON AFRICAN AMERICAN): >60 ML/MIN/1.73 M^2
GLUCOSE SERPL-MCNC: 95 MG/DL (ref 70–110)
HDLC SERPL-MCNC: 36 MG/DL (ref 40–75)
HDLC SERPL: 23.1 % (ref 20–50)
LDLC SERPL CALC-MCNC: 106.2 MG/DL (ref 63–159)
NONHDLC SERPL-MCNC: 120 MG/DL
POTASSIUM SERPL-SCNC: 3.7 MMOL/L (ref 3.5–5.1)
PROT SERPL-MCNC: 7.6 G/DL (ref 6–8.4)
SODIUM SERPL-SCNC: 137 MMOL/L (ref 136–145)
TRIGL SERPL-MCNC: 69 MG/DL (ref 30–150)

## 2019-04-02 PROCEDURE — 80061 LIPID PANEL: CPT

## 2019-04-02 PROCEDURE — 80048 BASIC METABOLIC PNL TOTAL CA: CPT

## 2019-04-02 PROCEDURE — 80076 HEPATIC FUNCTION PANEL: CPT

## 2019-04-02 PROCEDURE — 36415 COLL VENOUS BLD VENIPUNCTURE: CPT

## 2019-04-02 NOTE — TELEPHONE ENCOUNTER
----- Message from Ellie Hong sent at 4/2/2019  9:43 AM CDT -----  Contact: Raya Everett   Needs Advice    Reason for call: Caller calling to schedule pt's f/u appt. Caller states this is her second attempt to schedule pt's appt and no one has called her back.         Communication Preference: [phone]    Additional Information: Please contact caller regarding this matter

## 2019-04-05 DIAGNOSIS — I73.9 PVD (PERIPHERAL VASCULAR DISEASE): Primary | ICD-10-CM

## 2019-04-07 ENCOUNTER — HOSPITAL ENCOUNTER (EMERGENCY)
Facility: HOSPITAL | Age: 59
Discharge: HOME | End: 2019-04-07
Attending: FAMILY MEDICINE
Payer: MEDICARE

## 2019-04-07 VITALS
RESPIRATION RATE: 18 BRPM | HEART RATE: 128 BPM | DIASTOLIC BLOOD PRESSURE: 91 MMHG | TEMPERATURE: 98 F | SYSTOLIC BLOOD PRESSURE: 150 MMHG | WEIGHT: 145 LBS | HEIGHT: 67 IN | BODY MASS INDEX: 22.76 KG/M2 | OXYGEN SATURATION: 97 %

## 2019-04-07 DIAGNOSIS — I70.229 CRITICAL LOWER LIMB ISCHEMIA: Primary | ICD-10-CM

## 2019-04-07 LAB
ALBUMIN SERPL BCP-MCNC: 3.2 G/DL (ref 3.5–5.2)
ALP SERPL-CCNC: 140 U/L (ref 55–135)
ALT SERPL W/O P-5'-P-CCNC: 38 U/L (ref 10–44)
ANION GAP SERPL CALC-SCNC: 10 MMOL/L (ref 8–16)
APTT BLDCRRT: 30.8 SEC (ref 21–32)
AST SERPL-CCNC: 80 U/L (ref 10–40)
BASOPHILS # BLD AUTO: 0.07 K/UL (ref 0–0.2)
BASOPHILS NFR BLD: 0.4 % (ref 0–1.9)
BILIRUB SERPL-MCNC: 0.9 MG/DL (ref 0.1–1)
BUN SERPL-MCNC: 14 MG/DL (ref 6–20)
CALCIUM SERPL-MCNC: 8.7 MG/DL (ref 8.7–10.5)
CHLORIDE SERPL-SCNC: 100 MMOL/L (ref 95–110)
CO2 SERPL-SCNC: 23 MMOL/L (ref 23–29)
CREAT SERPL-MCNC: 0.8 MG/DL (ref 0.5–1.4)
DIFFERENTIAL METHOD: ABNORMAL
EOSINOPHIL # BLD AUTO: 0.2 K/UL (ref 0–0.5)
EOSINOPHIL NFR BLD: 1.2 % (ref 0–8)
ERYTHROCYTE [DISTWIDTH] IN BLOOD BY AUTOMATED COUNT: 13.2 % (ref 11.5–14.5)
EST. GFR  (AFRICAN AMERICAN): >60 ML/MIN/1.73 M^2
EST. GFR  (NON AFRICAN AMERICAN): >60 ML/MIN/1.73 M^2
GLUCOSE SERPL-MCNC: 136 MG/DL (ref 70–110)
HCT VFR BLD AUTO: 31.5 % (ref 40–54)
HGB BLD-MCNC: 11.1 G/DL (ref 14–18)
IMM GRANULOCYTES # BLD AUTO: 0.05 K/UL (ref 0–0.04)
IMM GRANULOCYTES NFR BLD AUTO: 0.3 % (ref 0–0.5)
INR PPP: 1.4 (ref 0.8–1.2)
LACTATE SERPL-SCNC: 1.6 MMOL/L (ref 0.5–2.2)
LYMPHOCYTES # BLD AUTO: 1.7 K/UL (ref 1–4.8)
LYMPHOCYTES NFR BLD: 9.9 % (ref 18–48)
MCH RBC QN AUTO: 27.3 PG (ref 27–31)
MCHC RBC AUTO-ENTMCNC: 35.2 G/DL (ref 32–36)
MCV RBC AUTO: 78 FL (ref 82–98)
MONOCYTES # BLD AUTO: 1.5 K/UL (ref 0.3–1)
MONOCYTES NFR BLD: 9 % (ref 4–15)
NEUTROPHILS # BLD AUTO: 13.6 K/UL (ref 1.8–7.7)
NEUTROPHILS NFR BLD: 79.2 % (ref 38–73)
NRBC BLD-RTO: 0 /100 WBC
PLATELET # BLD AUTO: 558 K/UL (ref 150–350)
PMV BLD AUTO: 9.5 FL (ref 9.2–12.9)
POTASSIUM SERPL-SCNC: 4 MMOL/L (ref 3.5–5.1)
PROT SERPL-MCNC: 7.3 G/DL (ref 6–8.4)
PROTHROMBIN TIME: 15.3 SEC (ref 9–12.5)
RBC # BLD AUTO: 4.06 M/UL (ref 4.6–6.2)
SODIUM SERPL-SCNC: 133 MMOL/L (ref 136–145)
WBC # BLD AUTO: 17.15 K/UL (ref 3.9–12.7)

## 2019-04-07 PROCEDURE — 73706 CT ANGIO LWR EXTR W/O&W/DYE: CPT | Mod: TC,50

## 2019-04-07 PROCEDURE — 99285 EMERGENCY DEPT VISIT HI MDM: CPT | Mod: 25

## 2019-04-07 PROCEDURE — 25500020 PHARM REV CODE 255: Performed by: FAMILY MEDICINE

## 2019-04-07 PROCEDURE — 85610 PROTHROMBIN TIME: CPT

## 2019-04-07 PROCEDURE — 96376 TX/PRO/DX INJ SAME DRUG ADON: CPT | Mod: 59

## 2019-04-07 PROCEDURE — 73706 CT ANGIO LWR EXTR W/O&W/DYE: CPT | Mod: 26,LT,, | Performed by: RADIOLOGY

## 2019-04-07 PROCEDURE — 36415 COLL VENOUS BLD VENIPUNCTURE: CPT

## 2019-04-07 PROCEDURE — 83605 ASSAY OF LACTIC ACID: CPT

## 2019-04-07 PROCEDURE — 85025 COMPLETE CBC W/AUTO DIFF WBC: CPT

## 2019-04-07 PROCEDURE — 25000003 PHARM REV CODE 250: Performed by: FAMILY MEDICINE

## 2019-04-07 PROCEDURE — 80053 COMPREHEN METABOLIC PANEL: CPT

## 2019-04-07 PROCEDURE — 73706 CTA LOWER EXTREMITY: ICD-10-PCS | Mod: 26,LT,, | Performed by: RADIOLOGY

## 2019-04-07 PROCEDURE — 85730 THROMBOPLASTIN TIME PARTIAL: CPT

## 2019-04-07 PROCEDURE — 96374 THER/PROPH/DIAG INJ IV PUSH: CPT

## 2019-04-07 PROCEDURE — 96361 HYDRATE IV INFUSION ADD-ON: CPT

## 2019-04-07 PROCEDURE — 63600175 PHARM REV CODE 636 W HCPCS: Performed by: FAMILY MEDICINE

## 2019-04-07 RX ORDER — SENNOSIDES 8.6 MG/1
1 TABLET ORAL DAILY
COMMUNITY

## 2019-04-07 RX ORDER — HYDROCODONE BITARTRATE AND ACETAMINOPHEN 7.5; 325 MG/1; MG/1
1 TABLET ORAL EVERY 6 HOURS PRN
Status: DISCONTINUED | OUTPATIENT
Start: 2019-04-07 | End: 2019-04-07 | Stop reason: HOSPADM

## 2019-04-07 RX ORDER — HYDROMORPHONE HYDROCHLORIDE 2 MG/ML
1 INJECTION, SOLUTION INTRAMUSCULAR; INTRAVENOUS; SUBCUTANEOUS
Status: COMPLETED | OUTPATIENT
Start: 2019-04-07 | End: 2019-04-07

## 2019-04-07 RX ORDER — HYDROCODONE BITARTRATE AND ACETAMINOPHEN 7.5; 325 MG/1; MG/1
1 TABLET ORAL EVERY 4 HOURS PRN
Qty: 20 TABLET | Refills: 0 | Status: SHIPPED | OUTPATIENT
Start: 2019-04-07 | End: 2019-05-16

## 2019-04-07 RX ORDER — HYDROCODONE BITARTRATE AND ACETAMINOPHEN 7.5; 325 MG/1; MG/1
1 TABLET ORAL EVERY 6 HOURS PRN
COMMUNITY
End: 2019-04-07

## 2019-04-07 RX ADMIN — HYDROMORPHONE HYDROCHLORIDE 1 MG: 2 INJECTION, SOLUTION INTRAMUSCULAR; INTRAVENOUS; SUBCUTANEOUS at 10:04

## 2019-04-07 RX ADMIN — SODIUM CHLORIDE 1000 ML: 9 INJECTION, SOLUTION INTRAVENOUS at 10:04

## 2019-04-07 RX ADMIN — IOHEXOL 75 ML: 350 INJECTION, SOLUTION INTRAVENOUS at 11:04

## 2019-04-07 RX ADMIN — HYDROMORPHONE HYDROCHLORIDE 1 MG: 2 INJECTION, SOLUTION INTRAMUSCULAR; INTRAVENOUS; SUBCUTANEOUS at 03:04

## 2019-04-07 RX ADMIN — HYDROCODONE BITARTRATE AND ACETAMINOPHEN 1 TABLET: 7.5; 325 TABLET ORAL at 02:04

## 2019-04-07 NOTE — DISCHARGE INSTRUCTIONS
Please keep appointment on 04/10/2019 with vascular surgeon.  This has already been scheduled.  If you are unable to make this appointment you can call Dr. Vanegas's office tomorrow morning to make appointment next week for evaluation for surgery.  Return to ER if pain is uncontrolled or worsened significantly.  Return to ER if fevers recur.

## 2019-04-07 NOTE — ED PROVIDER NOTES
Encounter Date: 2019       History     Chief Complaint   Patient presents with    Leg Pain     RLE     Patient comes is complaining of acute onset of right lower extremity pain last night.  The pain is continued into this morning.  The patient is a nursing home patient.  He is undergoing rehabilitation status post a left AKA secondary to arterial occlusion and ischemia.  Patient has known peripheral vascular disease.  He also has cerebral palsy, hypertension and hyperlipidemia.  He denies diabetes.  Last ID of acute onset.  He has no trauma reported.  He was given pain medication last night.  This morning when his pain did not resolve, he was sent to our facility for further evaluation.  Reports were that he did not have palpable pulses.  Patient claims that have decreased sensation and inability to move his toes at this time.        Review of patient's allergies indicates:  No Known Allergies  Past Medical History:   Diagnosis Date    Allergy     Arthritis     Hypertension     Neuropathy      Past Surgical History:   Procedure Laterality Date    ABDOMINAL SURGERY      AMPUTATION      right foot 5th digit    AMPUTATION, ABOVE KNEE Left 3/20/2019    Performed [facility]io [facility]mos [facility] at [facility]    COLONOSCOPY  2018    COLONOSCOPY N/A 2018    Performed by [facility]elan [facility] at [facility] ENDO    ESOPHAGOGASTRODUODENOSCOPY  2018    ESOPHAGOGASTRODUODENOSCOPY (EGD) N/A 2018    Performed by [facility]an [facility] at [facility] ENDO    HIP SURGERY Bilateral     x2     No family history on file.  Social History     Tobacco Use    Smoking status: Former Smoker     Types: Cigarettes     Last attempt to quit: 3/12/2019     Years since quittin.0    Smokeless tobacco: Never Used   Substance Use Topics    Alcohol use: Yes     Comment: 1-2    Drug use: No     Review of Systems   Constitutional: Negative for diaphoresis, fatigue and fever.   HENT: Negative for congestion, facial  swelling, postnasal drip, rhinorrhea and sinus pain.    Eyes: Negative for visual disturbance.   Respiratory: Negative for shortness of breath and wheezing.    Cardiovascular: Negative for chest pain, palpitations and leg swelling.   Gastrointestinal: Negative for abdominal pain, constipation, diarrhea and vomiting.   Endocrine: Negative for polyuria.   Genitourinary: Negative for dysuria and frequency.   Musculoskeletal: Negative for arthralgias and back pain.        Right lower extremity pain from mid leg distally to heal.   Skin: Negative for color change, pallor, rash and wound.   Allergic/Immunologic: Negative for immunocompromised state.   Neurological: Negative for dizziness, seizures, syncope, weakness, numbness and headaches.   Hematological: Negative for adenopathy. Does not bruise/bleed easily.   Psychiatric/Behavioral: Negative for agitation and confusion.       Physical Exam     Initial Vitals [04/07/19 1020]   BP Pulse Resp Temp SpO2   (!) 175/108 (!) 130 18 98.1 °F (36.7 °C) 96 %      MAP       --         Physical Exam    Nursing note and vitals reviewed.  Constitutional: He appears well-developed and well-nourished. He is not diaphoretic. No distress.   HENT:   Head: Normocephalic.   Eyes: Conjunctivae and EOM are normal.   Neck: Normal range of motion. Neck supple.   Cardiovascular: Normal rate, regular rhythm, normal heart sounds and intact distal pulses.   No murmur heard.  Pulmonary/Chest: Breath sounds normal. No respiratory distress.   Abdominal: Soft. He exhibits no distension. There is no tenderness.   Musculoskeletal: He exhibits tenderness. He exhibits no edema.   Normal physical exam except for left AKA.  Also patient has pain from his left mid leg distally.  He has decreased over on palpable pulses to the dorsalis pedis and posterior tibial arteries.  He is unable to move his toes at this time.  He has no pain on passive range of motion of his toes.  He has decreased sensation to his  foot.   Lymphadenopathy:     He has no cervical adenopathy.   Neurological: He is alert and oriented to person, place, and time. He has normal strength. A sensory deficit is present.   Decreased sensation to right foot   Skin: Skin is warm and dry. Capillary refill takes more than 3 seconds. No rash noted. No pallor.   Non palpable pulses right dorsalis pedis and posterior tibial   Psychiatric: He has a normal mood and affect. His behavior is normal. Judgment normal.         ED Course   Procedures  Labs Reviewed   PROTIME-INR - Abnormal; Notable for the following components:       Result Value    Prothrombin Time 15.3 (*)     INR 1.4 (*)     All other components within normal limits   CBC W/ AUTO DIFFERENTIAL - Abnormal; Notable for the following components:    WBC 17.15 (*)     RBC 4.06 (*)     Hemoglobin 11.1 (*)     Hematocrit 31.5 (*)     MCV 78 (*)     Platelets 558 (*)     Gran # (ANC) 13.6 (*)     Immature Grans (Abs) 0.05 (*)     Mono # 1.5 (*)     Gran% 79.2 (*)     Lymph% 9.9 (*)     All other components within normal limits   COMPREHENSIVE METABOLIC PANEL - Abnormal; Notable for the following components:    Sodium 133 (*)     Glucose 136 (*)     Albumin 3.2 (*)     Alkaline Phosphatase 140 (*)     AST 80 (*)     All other components within normal limits   APTT   LACTIC ACID, PLASMA          Imaging Results          CTA Lower Extremity (In process)                                    ED Course as of Apr 07 1348   Sun Apr 07, 2019   1312 Call was placed to transfer center for transfer for vascular surgery evaluation.    [RO]      ED Course User Index  [RO] Morteza Alexander MD     Clinical Impression:       ICD-10-CM ICD-9-CM   1. Critical lower limb ischemia I99.8 459.9         Disposition:   Disposition: Discharged  Condition: Stable                        Morteza Alexander MD  04/07/19 1352       Morteza Alexander MD  04/07/19 1401       Morteza Alexander MD  04/07/19 1404       Morteza Alexander  MD  04/07/19 1527

## 2019-04-09 ENCOUNTER — LAB VISIT (OUTPATIENT)
Dept: LAB | Facility: HOSPITAL | Age: 59
End: 2019-04-09
Attending: INTERNAL MEDICINE
Payer: MEDICARE

## 2019-04-09 DIAGNOSIS — Z89.611 STATUS POST BILATERAL ABOVE KNEE AMPUTATION: ICD-10-CM

## 2019-04-09 DIAGNOSIS — Z47.81 AFTERCARE FOR AMPUTATION STUMP: Primary | ICD-10-CM

## 2019-04-09 DIAGNOSIS — Z89.612 STATUS POST BILATERAL ABOVE KNEE AMPUTATION: ICD-10-CM

## 2019-04-09 DIAGNOSIS — I10 ESSENTIAL HYPERTENSION, MALIGNANT: ICD-10-CM

## 2019-04-09 DIAGNOSIS — E78.5 HYPERLIPEMIA: ICD-10-CM

## 2019-04-09 LAB
ALBUMIN SERPL BCP-MCNC: 2.9 G/DL (ref 3.5–5.2)
ALP SERPL-CCNC: 133 U/L (ref 55–135)
ALT SERPL W/O P-5'-P-CCNC: 32 U/L (ref 10–44)
ANION GAP SERPL CALC-SCNC: 13 MMOL/L (ref 8–16)
AST SERPL-CCNC: 63 U/L (ref 10–40)
BILIRUB SERPL-MCNC: 0.6 MG/DL (ref 0.1–1)
BUN SERPL-MCNC: 12 MG/DL (ref 6–20)
CALCIUM SERPL-MCNC: 9.2 MG/DL (ref 8.7–10.5)
CHLORIDE SERPL-SCNC: 98 MMOL/L (ref 95–110)
CO2 SERPL-SCNC: 27 MMOL/L (ref 23–29)
CREAT SERPL-MCNC: 0.5 MG/DL (ref 0.5–1.4)
EST. GFR  (AFRICAN AMERICAN): >60 ML/MIN/1.73 M^2
EST. GFR  (NON AFRICAN AMERICAN): >60 ML/MIN/1.73 M^2
GLUCOSE SERPL-MCNC: 107 MG/DL (ref 70–110)
POTASSIUM SERPL-SCNC: 3.9 MMOL/L (ref 3.5–5.1)
PROT SERPL-MCNC: 7.3 G/DL (ref 6–8.4)
SODIUM SERPL-SCNC: 138 MMOL/L (ref 136–145)

## 2019-04-09 PROCEDURE — 80053 COMPREHEN METABOLIC PANEL: CPT

## 2019-04-09 PROCEDURE — 36415 COLL VENOUS BLD VENIPUNCTURE: CPT

## 2019-04-10 ENCOUNTER — OFFICE VISIT (OUTPATIENT)
Dept: VASCULAR SURGERY | Facility: CLINIC | Age: 59
End: 2019-04-10
Payer: MEDICARE

## 2019-04-10 ENCOUNTER — HOSPITAL ENCOUNTER (INPATIENT)
Facility: HOSPITAL | Age: 59
LOS: 15 days | Discharge: NURSING, REHAB OR LONG-TERM CARE | DRG: 240 | End: 2019-04-25
Attending: SURGERY | Admitting: SURGERY
Payer: MEDICARE

## 2019-04-10 ENCOUNTER — HOSPITAL ENCOUNTER (OUTPATIENT)
Dept: VASCULAR SURGERY | Facility: CLINIC | Age: 59
Discharge: HOME | End: 2019-04-10
Attending: SURGERY
Payer: MEDICARE

## 2019-04-10 ENCOUNTER — ANESTHESIA EVENT (OUTPATIENT)
Dept: SURGERY | Facility: HOSPITAL | Age: 59
DRG: 240 | End: 2019-04-10
Payer: MEDICARE

## 2019-04-10 VITALS
HEIGHT: 68 IN | BODY MASS INDEX: 22.43 KG/M2 | DIASTOLIC BLOOD PRESSURE: 82 MMHG | TEMPERATURE: 98 F | HEART RATE: 104 BPM | SYSTOLIC BLOOD PRESSURE: 132 MMHG | WEIGHT: 148 LBS

## 2019-04-10 DIAGNOSIS — I70.229 CRITICAL LOWER LIMB ISCHEMIA: Primary | ICD-10-CM

## 2019-04-10 DIAGNOSIS — I70.229 CRITICAL LOWER LIMB ISCHEMIA: ICD-10-CM

## 2019-04-10 DIAGNOSIS — I73.9 PVD (PERIPHERAL VASCULAR DISEASE): ICD-10-CM

## 2019-04-10 DIAGNOSIS — Z78.9 IMPAIRED MOBILITY AND ACTIVITIES OF DAILY LIVING: ICD-10-CM

## 2019-04-10 DIAGNOSIS — R00.0 SINUS TACHYCARDIA: ICD-10-CM

## 2019-04-10 DIAGNOSIS — Z36.89: ICD-10-CM

## 2019-04-10 DIAGNOSIS — Z89.612 HX OF AKA (ABOVE KNEE AMPUTATION), LEFT: ICD-10-CM

## 2019-04-10 DIAGNOSIS — R00.0 TACHYCARDIA: ICD-10-CM

## 2019-04-10 DIAGNOSIS — Z01.810 PREOP CARDIOVASCULAR EXAM: ICD-10-CM

## 2019-04-10 DIAGNOSIS — Z74.09 IMPAIRED MOBILITY AND ACTIVITIES OF DAILY LIVING: ICD-10-CM

## 2019-04-10 LAB
ALBUMIN SERPL BCP-MCNC: 2.8 G/DL (ref 3.5–5.2)
ALP SERPL-CCNC: 162 U/L (ref 55–135)
ALT SERPL W/O P-5'-P-CCNC: 34 U/L (ref 10–44)
ANION GAP SERPL CALC-SCNC: 10 MMOL/L (ref 8–16)
APTT BLDCRRT: 24.2 SEC (ref 21–32)
AST SERPL-CCNC: 54 U/L (ref 10–40)
BASOPHILS # BLD AUTO: 0.06 K/UL (ref 0–0.2)
BASOPHILS NFR BLD: 0.6 % (ref 0–1.9)
BILIRUB SERPL-MCNC: 0.4 MG/DL (ref 0.1–1)
BUN SERPL-MCNC: 13 MG/DL (ref 6–20)
CALCIUM SERPL-MCNC: 9.9 MG/DL (ref 8.7–10.5)
CHLORIDE SERPL-SCNC: 98 MMOL/L (ref 95–110)
CO2 SERPL-SCNC: 28 MMOL/L (ref 23–29)
CREAT SERPL-MCNC: 0.7 MG/DL (ref 0.5–1.4)
DIFFERENTIAL METHOD: ABNORMAL
EOSINOPHIL # BLD AUTO: 0.3 K/UL (ref 0–0.5)
EOSINOPHIL NFR BLD: 2.8 % (ref 0–8)
ERYTHROCYTE [DISTWIDTH] IN BLOOD BY AUTOMATED COUNT: 13.7 % (ref 11.5–14.5)
EST. GFR  (AFRICAN AMERICAN): >60 ML/MIN/1.73 M^2
EST. GFR  (NON AFRICAN AMERICAN): >60 ML/MIN/1.73 M^2
GLUCOSE SERPL-MCNC: 133 MG/DL (ref 70–110)
HCT VFR BLD AUTO: 32.9 % (ref 40–54)
HGB BLD-MCNC: 11.5 G/DL (ref 14–18)
IMM GRANULOCYTES # BLD AUTO: 0.03 K/UL (ref 0–0.04)
IMM GRANULOCYTES NFR BLD AUTO: 0.3 % (ref 0–0.5)
INR PPP: 1 (ref 0.8–1.2)
LYMPHOCYTES # BLD AUTO: 1.5 K/UL (ref 1–4.8)
LYMPHOCYTES NFR BLD: 16 % (ref 18–48)
MAGNESIUM SERPL-MCNC: 1.7 MG/DL (ref 1.6–2.6)
MCH RBC QN AUTO: 27.4 PG (ref 27–31)
MCHC RBC AUTO-ENTMCNC: 35 G/DL (ref 32–36)
MCV RBC AUTO: 78 FL (ref 82–98)
MONOCYTES # BLD AUTO: 0.4 K/UL (ref 0.3–1)
MONOCYTES NFR BLD: 4.6 % (ref 4–15)
NEUTROPHILS # BLD AUTO: 7.3 K/UL (ref 1.8–7.7)
NEUTROPHILS NFR BLD: 75.7 % (ref 38–73)
NRBC BLD-RTO: 0 /100 WBC
PHOSPHATE SERPL-MCNC: 3.4 MG/DL (ref 2.7–4.5)
PLATELET # BLD AUTO: 571 K/UL (ref 150–350)
PMV BLD AUTO: 10.3 FL (ref 9.2–12.9)
POTASSIUM SERPL-SCNC: 3.7 MMOL/L (ref 3.5–5.1)
PROT SERPL-MCNC: 7.4 G/DL (ref 6–8.4)
PROTHROMBIN TIME: 10.1 SEC (ref 9–12.5)
RBC # BLD AUTO: 4.2 M/UL (ref 4.6–6.2)
SODIUM SERPL-SCNC: 136 MMOL/L (ref 136–145)
WBC # BLD AUTO: 9.58 K/UL (ref 3.9–12.7)

## 2019-04-10 PROCEDURE — 93005 ELECTROCARDIOGRAM TRACING: CPT

## 2019-04-10 PROCEDURE — 3079F DIAST BP 80-89 MM HG: CPT | Mod: CPTII,S$GLB,, | Performed by: SURGERY

## 2019-04-10 PROCEDURE — 85610 PROTHROMBIN TIME: CPT

## 2019-04-10 PROCEDURE — 93923 PR NON-INVASIVE PHYSIOLOGIC STUDY EXTREMITY 3 LEVELS: ICD-10-PCS | Mod: S$GLB,,, | Performed by: SURGERY

## 2019-04-10 PROCEDURE — 80053 COMPREHEN METABOLIC PANEL: CPT

## 2019-04-10 PROCEDURE — 63600175 PHARM REV CODE 636 W HCPCS: Performed by: STUDENT IN AN ORGANIZED HEALTH CARE EDUCATION/TRAINING PROGRAM

## 2019-04-10 PROCEDURE — 83735 ASSAY OF MAGNESIUM: CPT

## 2019-04-10 PROCEDURE — 11000001 HC ACUTE MED/SURG PRIVATE ROOM

## 2019-04-10 PROCEDURE — 99999 PR PBB SHADOW E&M-EST. PATIENT-LVL V: CPT | Mod: PBBFAC,,, | Performed by: SURGERY

## 2019-04-10 PROCEDURE — 36415 COLL VENOUS BLD VENIPUNCTURE: CPT

## 2019-04-10 PROCEDURE — 25000003 PHARM REV CODE 250: Performed by: STUDENT IN AN ORGANIZED HEALTH CARE EDUCATION/TRAINING PROGRAM

## 2019-04-10 PROCEDURE — 3008F BODY MASS INDEX DOCD: CPT | Mod: CPTII,S$GLB,, | Performed by: SURGERY

## 2019-04-10 PROCEDURE — 84100 ASSAY OF PHOSPHORUS: CPT

## 2019-04-10 PROCEDURE — 93010 ELECTROCARDIOGRAM REPORT: CPT | Mod: ,,, | Performed by: INTERNAL MEDICINE

## 2019-04-10 PROCEDURE — 3075F SYST BP GE 130 - 139MM HG: CPT | Mod: CPTII,S$GLB,, | Performed by: SURGERY

## 2019-04-10 PROCEDURE — 93010 EKG 12-LEAD: ICD-10-PCS | Mod: ,,, | Performed by: INTERNAL MEDICINE

## 2019-04-10 PROCEDURE — 3075F PR MOST RECENT SYSTOLIC BLOOD PRESS GE 130-139MM HG: ICD-10-PCS | Mod: CPTII,S$GLB,, | Performed by: SURGERY

## 2019-04-10 PROCEDURE — S0030 INJECTION, METRONIDAZOLE: HCPCS | Performed by: STUDENT IN AN ORGANIZED HEALTH CARE EDUCATION/TRAINING PROGRAM

## 2019-04-10 PROCEDURE — 3079F PR MOST RECENT DIASTOLIC BLOOD PRESSURE 80-89 MM HG: ICD-10-PCS | Mod: CPTII,S$GLB,, | Performed by: SURGERY

## 2019-04-10 PROCEDURE — 93923 UPR/LXTR ART STDY 3+ LVLS: CPT | Mod: S$GLB,,, | Performed by: SURGERY

## 2019-04-10 PROCEDURE — 99214 OFFICE O/P EST MOD 30 MIN: CPT | Mod: 24,S$GLB,, | Performed by: SURGERY

## 2019-04-10 PROCEDURE — 99999 PR PBB SHADOW E&M-EST. PATIENT-LVL V: ICD-10-PCS | Mod: PBBFAC,,, | Performed by: SURGERY

## 2019-04-10 PROCEDURE — 85730 THROMBOPLASTIN TIME PARTIAL: CPT

## 2019-04-10 PROCEDURE — 3008F PR BODY MASS INDEX (BMI) DOCUMENTED: ICD-10-PCS | Mod: CPTII,S$GLB,, | Performed by: SURGERY

## 2019-04-10 PROCEDURE — 99214 PR OFFICE/OUTPT VISIT, EST, LEVL IV, 30-39 MIN: ICD-10-PCS | Mod: 24,S$GLB,, | Performed by: SURGERY

## 2019-04-10 PROCEDURE — 85025 COMPLETE CBC W/AUTO DIFF WBC: CPT

## 2019-04-10 RX ORDER — METRONIDAZOLE 500 MG/100ML
500 INJECTION, SOLUTION INTRAVENOUS
Status: CANCELLED | OUTPATIENT
Start: 2019-04-10

## 2019-04-10 RX ORDER — VANCOMYCIN HCL IN 5 % DEXTROSE 1G/250ML
1000 PLASTIC BAG, INJECTION (ML) INTRAVENOUS
Status: DISCONTINUED | OUTPATIENT
Start: 2019-04-10 | End: 2019-04-15

## 2019-04-10 RX ORDER — OXYCODONE HYDROCHLORIDE 5 MG/1
5 TABLET ORAL EVERY 4 HOURS PRN
Status: DISCONTINUED | OUTPATIENT
Start: 2019-04-10 | End: 2019-04-25 | Stop reason: HOSPADM

## 2019-04-10 RX ORDER — ACETAMINOPHEN 325 MG/1
650 TABLET ORAL EVERY 4 HOURS PRN
Status: DISCONTINUED | OUTPATIENT
Start: 2019-04-10 | End: 2019-04-11

## 2019-04-10 RX ORDER — CEFAZOLIN SODIUM 1 G/3ML
1 INJECTION, POWDER, FOR SOLUTION INTRAMUSCULAR; INTRAVENOUS
Status: DISCONTINUED | OUTPATIENT
Start: 2019-04-10 | End: 2019-04-15

## 2019-04-10 RX ORDER — ONDANSETRON 2 MG/ML
4 INJECTION INTRAMUSCULAR; INTRAVENOUS EVERY 6 HOURS PRN
Status: DISCONTINUED | OUTPATIENT
Start: 2019-04-10 | End: 2019-04-25 | Stop reason: HOSPADM

## 2019-04-10 RX ORDER — FENTANYL CITRATE 50 UG/ML
25 INJECTION, SOLUTION INTRAMUSCULAR; INTRAVENOUS EVERY 5 MIN PRN
Status: DISCONTINUED | OUTPATIENT
Start: 2019-04-10 | End: 2019-04-11

## 2019-04-10 RX ORDER — SODIUM CHLORIDE 0.9 % (FLUSH) 0.9 %
3 SYRINGE (ML) INJECTION
Status: CANCELLED | OUTPATIENT
Start: 2019-04-10

## 2019-04-10 RX ORDER — OXYCODONE HYDROCHLORIDE 5 MG/1
5 TABLET ORAL EVERY 4 HOURS PRN
Status: CANCELLED | OUTPATIENT
Start: 2019-04-10

## 2019-04-10 RX ORDER — PANTOPRAZOLE SODIUM 40 MG/1
TABLET, DELAYED RELEASE ORAL
COMMUNITY
Start: 2019-04-07

## 2019-04-10 RX ORDER — ONDANSETRON 2 MG/ML
4 INJECTION INTRAMUSCULAR; INTRAVENOUS EVERY 6 HOURS PRN
Status: CANCELLED | OUTPATIENT
Start: 2019-04-10

## 2019-04-10 RX ORDER — ACETAMINOPHEN 325 MG/1
650 TABLET ORAL EVERY 8 HOURS PRN
Status: DISCONTINUED | OUTPATIENT
Start: 2019-04-10 | End: 2019-04-11

## 2019-04-10 RX ORDER — OXYCODONE HYDROCHLORIDE 5 MG/1
10 TABLET ORAL EVERY 4 HOURS PRN
Status: CANCELLED | OUTPATIENT
Start: 2019-04-10

## 2019-04-10 RX ORDER — SODIUM CHLORIDE, SODIUM LACTATE, POTASSIUM CHLORIDE, CALCIUM CHLORIDE 600; 310; 30; 20 MG/100ML; MG/100ML; MG/100ML; MG/100ML
INJECTION, SOLUTION INTRAVENOUS CONTINUOUS
Status: CANCELLED | OUTPATIENT
Start: 2019-04-10

## 2019-04-10 RX ORDER — MIDAZOLAM HYDROCHLORIDE 1 MG/ML
0.5 INJECTION INTRAMUSCULAR; INTRAVENOUS
Status: DISCONTINUED | OUTPATIENT
Start: 2019-04-10 | End: 2019-04-11

## 2019-04-10 RX ORDER — SODIUM CHLORIDE, SODIUM LACTATE, POTASSIUM CHLORIDE, CALCIUM CHLORIDE 600; 310; 30; 20 MG/100ML; MG/100ML; MG/100ML; MG/100ML
INJECTION, SOLUTION INTRAVENOUS CONTINUOUS
Status: DISCONTINUED | OUTPATIENT
Start: 2019-04-10 | End: 2019-04-17

## 2019-04-10 RX ORDER — OXYCODONE HYDROCHLORIDE 10 MG/1
10 TABLET ORAL EVERY 4 HOURS PRN
Status: DISCONTINUED | OUTPATIENT
Start: 2019-04-10 | End: 2019-04-25 | Stop reason: HOSPADM

## 2019-04-10 RX ORDER — METOPROLOL SUCCINATE 50 MG/1
50 TABLET, EXTENDED RELEASE ORAL DAILY
Status: DISCONTINUED | OUTPATIENT
Start: 2019-04-10 | End: 2019-04-25 | Stop reason: HOSPADM

## 2019-04-10 RX ORDER — ACETAMINOPHEN 325 MG/1
650 TABLET ORAL EVERY 8 HOURS PRN
Status: CANCELLED | OUTPATIENT
Start: 2019-04-10

## 2019-04-10 RX ORDER — SODIUM CHLORIDE 0.9 % (FLUSH) 0.9 %
3 SYRINGE (ML) INJECTION
Status: DISCONTINUED | OUTPATIENT
Start: 2019-04-10 | End: 2019-04-25 | Stop reason: HOSPADM

## 2019-04-10 RX ORDER — ACETAMINOPHEN 325 MG/1
650 TABLET ORAL EVERY 4 HOURS PRN
Status: CANCELLED | OUTPATIENT
Start: 2019-04-10

## 2019-04-10 RX ORDER — METRONIDAZOLE 500 MG/100ML
500 INJECTION, SOLUTION INTRAVENOUS
Status: DISCONTINUED | OUTPATIENT
Start: 2019-04-10 | End: 2019-04-15

## 2019-04-10 RX ADMIN — SODIUM CHLORIDE, SODIUM LACTATE, POTASSIUM CHLORIDE, AND CALCIUM CHLORIDE: .6; .31; .03; .02 INJECTION, SOLUTION INTRAVENOUS at 04:04

## 2019-04-10 RX ADMIN — CEFAZOLIN 1 G: 1 INJECTION, POWDER, FOR SOLUTION INTRAMUSCULAR; INTRAVENOUS at 04:04

## 2019-04-10 RX ADMIN — OXYCODONE HYDROCHLORIDE 10 MG: 10 TABLET ORAL at 09:04

## 2019-04-10 RX ADMIN — CEFAZOLIN 1 G: 1 INJECTION, POWDER, FOR SOLUTION INTRAMUSCULAR; INTRAVENOUS at 11:04

## 2019-04-10 RX ADMIN — METRONIDAZOLE 500 MG: 500 INJECTION, SOLUTION INTRAVENOUS at 11:04

## 2019-04-10 RX ADMIN — OXYCODONE HYDROCHLORIDE 10 MG: 10 TABLET ORAL at 03:04

## 2019-04-10 RX ADMIN — METOPROLOL SUCCINATE 50 MG: 50 TABLET, EXTENDED RELEASE ORAL at 05:04

## 2019-04-10 RX ADMIN — METRONIDAZOLE 500 MG: 500 INJECTION, SOLUTION INTRAVENOUS at 04:04

## 2019-04-10 NOTE — ANESTHESIA PREPROCEDURE EVALUATION
[facility]  Anesthesia Pre-Operative Evaluation         Patient Name: Pranay Colindres  YOB: 1960  MRN: [id number]    SUBJECTIVE:     Pre-operative evaluation for Procedure(s) (LRB):  AMPUTATION, ABOVE KNEE (Right)  DEBRIDEMENT, WOUND (Left)  APPLICATION, WOUND VAC (Left)     04/10/2019    Pranay Colindres is a 59 y.o. male w/ a significant PMHx of HTN, arthritis, cerebral palsy, tobacco abuse, and PVD with previous aorto-bifem and L fem/pop. Recently underwent underwent emergent L AKA (3/20). Re-presented to OSH ED with RLE ischemic pain and [place] [facility] [place].     Now presents for the above procedures due to occluded aorto-bifem and poor pelvic collateral flow from the R IIA to the L PFA.       LDA:       Peripheral IV - Single Lumen 04/07/19 1021 Left Forearm (Active)   Number of days: 3       Prev airway:  GETA (03/20/2019)  Easy mask  Grade I with Abrams #2    Drips: None documented.      Patient Active Problem List   Diagnosis    Encounter for screening colonoscopy    Foot pain, right    Neuritis    Critical lower limb ischemia    Other cerebral palsy    Impaired mobility and activities of daily living    PVD (peripheral vascular disease)       Review of patient's allergies indicates:  No Known Allergies    Current Inpatient Medications:      Current Facility-Administered Medications on File Prior to Encounter   Medication Dose Route Frequency Provider Last Rate Last Dose    acetaminophen tablet 650 mg  650 mg Oral Q8H PRN Denisha Cahmbers MD        acetaminophen tablet 650 mg  650 mg Oral Q4H PRN Denisha Chambers MD        ceFAZolin injection 1 g  1 g Intravenous Q8H Denisha Chambers MD        fentaNYL injection 25 mcg  25 mcg Intravenous Q5 Min PRN Ignacio Nguyen MD        lactated ringers infusion   Intravenous Continuous Denisha Chambers MD        metronidazole IVPB 500 mg  500 mg Intravenous Q8H Denisha  Cata Chambers MD        midazolam (VERSED) 1 mg/mL injection 0.5 mg  0.5 mg Intravenous PRN Ignacio Nguyen MD        ondansetron injection 4 mg  4 mg Intravenous Q6H PRN Denisha Chambers MD        oxyCODONE immediate release tablet 5 mg  5 mg Oral Q4H PRN Denisha Chambers MD        oxyCODONE immediate release tablet Tab 10 mg  10 mg Oral Q4H PRN Denisha Chambers MD        promethazine (PHENERGAN) 6.25 mg in dextrose 5 % 50 mL IVPB  6.25 mg Intravenous Q6H PRN Denisha Chambers MD        sodium chloride 0.9% flush 3 mL  3 mL Intravenous PRN Denisha Chambers MD        vancomycin in dextrose 5 % 1 gram/250 mL IVPB 1,000 mg  1,000 mg Intravenous On Call Procedure Denisha Chambers MD         Current Outpatient Medications on File Prior to Encounter   Medication Sig Dispense Refill    amLODIPine (NORVASC) 10 MG tablet Take 1 tablet (10 mg total) by mouth once daily. 30 tablet 3    apixaban (ELIQUIS) 2.5 mg Tab Take 1 tablet (2.5 mg total) by mouth 2 (two) times daily. 60 tablet 3    aspirin (ECOTRIN) 81 MG EC tablet Take 1 tablet (81 mg total) by mouth once daily. 30 tablet 11    atorvastatin (LIPITOR) 40 MG tablet Take 1 tablet (40 mg total) by mouth once daily. 90 tablet 3    gabapentin (NEURONTIN) 300 MG capsule Take 1 capsule (300 mg total) by mouth every evening. 30 capsule 6    HYDROcodone-acetaminophen (NORCO) 7.5-325 mg per tablet Take 1 tablet by mouth every 4 (four) hours as needed for Pain. 20 tablet 0    latanoprost 0.005 % ophthalmic solution       metoprolol succinate (TOPROL-XL) 50 MG 24 hr tablet Take 50 mg by mouth once daily.      pantoprazole (PROTONIX) 40 MG tablet       senna (SENOKOT) 8.6 mg tablet Take 1 tablet by mouth once daily.      traMADol (ULTRAM) 50 mg tablet Take 1 tablet (50 mg total) by mouth every 6 (six) hours as needed for Pain. 3 tablet 0       Past Surgical History:   Procedure Laterality Date    ABDOMINAL  SURGERY      AMPUTATION      right foot 5th digit    AMPUTATION, ABOVE KNEE Left 3/20/2019    [facility] 2ND [facility]LR    COLONOSCOPY  2018    COLONOSCOPY N/A 2018    Performed by [facility]elan MD at [facility] ENDO    ESOPHAGOGASTRODUODENOSCOPY  2018    ESOPHAGOGASTRODUODENOSCOPY (EGD) N/A 2018    Performed by Jer[facility] Castelan MD at [facility] ENDO    HIP SURGERY Bilateral     x2       Social History     Socioeconomic History    Marital status: Single     Spouse name: Not on file    Number of children: Not on file    Years of education: Not on file    Highest education level: Not on file   Occupational History    Not on file   Social Needs    Financial resource strain: Not on file    Food insecurity:     Worry: Not on file     Inability: Not on file    Transportation needs:     Medical: Not on file     Non-medical: Not on file   Tobacco Use    Smoking status: Former Smoker     Types: Cigarettes     Last attempt to quit: 3/12/2019     Years since quittin.0    Smokeless tobacco: Never Used   Substance and Sexual Activity    Alcohol use: Yes     Comment: 1-2    Drug use: No    Sexual activity: Not on file   Lifestyle    Physical activity:     Days per week: Not on file     Minutes per session: Not on file    Stress: Not on file   Relationships    Social connections:     Talks on phone: Not on file     Gets together: Not on file     Attends Mandaen service: Not on file     Active member of club or organization: Not on file     Attends meetings of clubs or organizations: Not on file     Relationship status: Not on file   Other Topics Concern    Not on file   Social History Narrative    Not on file       OBJECTIVE:     Vital Signs Range (Last 24H):  Temp:  [36.8 °C (98.3 °F)]   Pulse:  [104]   BP: (132)/(82)       Significant Labs:  Lab Results   Component Value Date    WBC 17.15 (H) 2019    HGB 11.1 (L) 2019    HCT 31.5 (L)  04/07/2019     (H) 04/07/2019    CHOL 156 04/02/2019    TRIG 69 04/02/2019    HDL 36 (L) 04/02/2019    ALT 32 04/09/2019    AST 63 (H) 04/09/2019     04/09/2019    K 3.9 04/09/2019    CL 98 04/09/2019    CREATININE 0.5 04/09/2019    BUN 12 04/09/2019    CO2 27 04/09/2019    INR 1.4 (H) 04/07/2019       Diagnostic Studies: No relevant studies.    EKG: No recent studies available.    2D ECHO:  No results found for this or any previous visit.      ASSESSMENT/PLAN:         Anesthesia Evaluation    I have reviewed the Patient Summary Reports.    I have reviewed the Nursing Notes.   I have reviewed the Medications.     Review of Systems  Anesthesia Hx:  No problems with previous Anesthesia  History of prior surgery of interest to airway management or planning:  Denies Personal Hx of Anesthesia complications.   Social:  Smoker    Hematology/Oncology:         -- Anemia:   Cardiovascular:   Hypertension PVD  Peripheral Arterial Disease  Hypertension    Pulmonary:  Pulmonary Normal    Hepatic/GI:  Hepatic/GI Normal    Musculoskeletal:  Musculoskeletal General/Symptoms: Functional capacity is ambulatory with walker.    Neurological:   Cerebral palsey   Endocrine:  Endocrine Normal        Physical Exam  General:  Well nourished    Airway/Jaw/Neck:  Airway Findings: Mouth Opening: Normal Tongue: Normal  General Airway Assessment: Adult  Mallampati: II  Improves to I with phonation.  TM Distance: Normal, at least 6 cm  Jaw/Neck Findings:  Neck ROM: Normal ROM      Dental:  Dental Findings:    Chest/Lungs:  Chest/Lungs Findings: Clear to auscultation     Heart/Vascular:  Heart Findings: Rate: Normal  Rhythm: Regular Rhythm        Mental Status:  Mental Status Findings:  Cooperative, Alert and Oriented         Anesthesia Plan  Type of Anesthesia, risks & benefits discussed:  Anesthesia Type:  general, regional, MAC  Patient's Preference:   Intra-op Monitoring Plan: standard ASA monitors  Intra-op Monitoring Plan  Comments:   Post Op Pain Control Plan: multimodal analgesia, IV/PO Opioids PRN and per primary service following discharge from PACU  Post Op Pain Control Plan Comments:   Induction:   IV  Beta Blocker:  Patient is not currently on a Beta-Blocker (No further documentation required).       Informed Consent: Patient understands risks and agrees with Anesthesia plan.  Questions answered. Anesthesia consent signed with patient.  ASA Score: 3     Day of Surgery Review of History & Physical:    H&P update referred to the surgeon.         Ready For Surgery From Anesthesia Perspective.

## 2019-04-10 NOTE — CARE UPDATE
Dr. Pete notified of h/r=123. No new orders will continue to monitor. Patient denies being symptomatic.

## 2019-04-10 NOTE — PROGRESS NOTES
Pranay Colindres  04/10/2019    HPI:  Patient is a 59 y.o. male with a h/o HTN, arthritis, and cerebral palsy, known PAD s/p previous aorto-bifem and L fem/pop tobacco abuse who presented to the ED 3/20/19 as a transfer from [facility] with a complaint of pulseless left foot; underwent emergent L AKA.  By history, it likely occluded in early March 2019 (he was ambulating in early March); in the ER, he had a retracted L foot with no motor/sensory.  I noted intra-op only one lateral PFA branch that was patent; no cautery was used.  Now comes with some fat necrosis L AKA stump and ischemic R  Foot rest pain.     Prior history of Aorto-bifem for what sounds like aorto-iliac occlusive disease and prior left Fem-AK pop bypass by Dr. Jose Lan in [place], [place]. Prior to admission, he had progressive left leg pain for past 2-3 weeks and was found in the ER to be unable to wiggle toes at baseline.      Tobacco use: Current smoker though states quick in mid-March 2019    Past Medical History:   Diagnosis Date    Allergy     Arthritis     Hypertension     Neuropathy      Past Surgical History:   Procedure Laterality Date    ABDOMINAL SURGERY      AMPUTATION      right foot 5th digit    AMPUTATION, ABOVE KNEE Left 3/20/2019    [facility] 2ND [facility]    COLONOSCOPY  04/23/2018    COLONOSCOPY N/A 4/23/2018    Performed by Jeramy Castelan MD at [facility] ENDO    ESOPHAGOGASTRODUODENOSCOPY  04/23/2018    ESOPHAGOGASTRODUODENOSCOPY (EGD) N/A 4/23/2018    Performed by Jeramy Castelan MD at [facility] ENDO    HIP SURGERY Bilateral     x2     History reviewed. No pertinent family history.  Social History     Socioeconomic History    Marital status: Single     Spouse name: Not on file    Number of children: Not on file    Years of education: Not on file    Highest education level: Not on file   Occupational History    Not on file   Social Needs    Financial resource strain: Not on  file    Food insecurity:     Worry: Not on file     Inability: Not on file    Transportation needs:     Medical: Not on file     Non-medical: Not on file   Tobacco Use    Smoking status: Former Smoker     Types: Cigarettes     Last attempt to quit: 3/12/2019     Years since quittin.0    Smokeless tobacco: Never Used   Substance and Sexual Activity    Alcohol use: Yes     Comment: 1-2    Drug use: No    Sexual activity: Not on file   Lifestyle    Physical activity:     Days per week: Not on file     Minutes per session: Not on file    Stress: Not on file   Relationships    Social connections:     Talks on phone: Not on file     Gets together: Not on file     Attends Voodoo service: Not on file     Active member of club or organization: Not on file     Attends meetings of clubs or organizations: Not on file     Relationship status: Not on file   Other Topics Concern    Not on file   Social History Narrative    Not on file       Current Outpatient Medications:     amLODIPine (NORVASC) 10 MG tablet, Take 1 tablet (10 mg total) by mouth once daily., Disp: 30 tablet, Rfl: 3    apixaban (ELIQUIS) 2.5 mg Tab, Take 1 tablet (2.5 mg total) by mouth 2 (two) times daily., Disp: 60 tablet, Rfl: 3    aspirin (ECOTRIN) 81 MG EC tablet, Take 1 tablet (81 mg total) by mouth once daily., Disp: 30 tablet, Rfl: 11    atorvastatin (LIPITOR) 40 MG tablet, Take 1 tablet (40 mg total) by mouth once daily., Disp: 90 tablet, Rfl: 3    HYDROcodone-acetaminophen (NORCO) 7.5-325 mg per tablet, Take 1 tablet by mouth every 4 (four) hours as needed for Pain., Disp: 20 tablet, Rfl: 0    latanoprost 0.005 % ophthalmic solution, , Disp: , Rfl:     metoprolol succinate (TOPROL-XL) 50 MG 24 hr tablet, Take 50 mg by mouth once daily., Disp: , Rfl:     pantoprazole (PROTONIX) 40 MG tablet, , Disp: , Rfl:     senna (SENOKOT) 8.6 mg tablet, Take 1 tablet by mouth once daily., Disp: , Rfl:     traMADol (ULTRAM) 50 mg tablet,  Take 1 tablet (50 mg total) by mouth every 6 (six) hours as needed for Pain., Disp: 3 tablet, Rfl: 0    gabapentin (NEURONTIN) 300 MG capsule, Take 1 capsule (300 mg total) by mouth every evening., Disp: 30 capsule, Rfl: 6    REVIEW OF SYSTEMS:  General: negative; ENT: negative; Allergy and Immunology: negative; Hematological and Lymphatic: Negative; Endocrine: negative; Respiratory: no cough, shortness of breath, or wheezing; Cardiovascular: no chest pain or dyspnea on exertion; Gastrointestinal: no abdominal pain/back, change in bowel habits, or bloody stools; Genito-Urinary: no dysuria, trouble voiding, or hematuria; Musculoskeletal: negative  Neurological: no TIA or stroke symptoms; Psychiatric: no nervousness, anxiety or depression.    PHYSICAL EXAM:   Right Arm BP - Sittin/82 (04/10/19 1053)  Left Arm BP - Sittin/84 (04/10/19 1053)  Pulse: 104  Temp: 98.3 °F (36.8 °C)      General appearance:  Alert, well-appearing, and in no distress.  Oriented to person, place, and time   Neurological: Normal speech, no focal findings noted; CN II - XII grossly intact           Musculoskeletal: Digits/nail without cyanosis/clubbing.  Normal muscle strength/tone.                 Neck: Supple, no significant adenopathy; thyroid is not enlarged                  No carotid bruit can be auscultated                Chest:  Clear to auscultation, no wheezes, rales or rhonchi, symmetric air entry     No use of accessory muscles             Cardiac: Normal rate and regular rhythm, S1 and S2 normal; PMI non-displaced          Abdomen: Soft, nontender, nondistended, no masses or organomegaly     No rebound tenderness noted; bowel sounds normal     Pulsatile aortic mass is not palpable.     No groin adenopathy      Extremities:   No femoral pulses bilaterally     L AKA stump - with some edema and evidence of suture line fat necrosis; medial and lateral tip ~2-3mm have dry necrosis     R foot is chronically ischemic and   LAB  RESULTS:  Lab Results   Component Value Date    K 3.9 04/09/2019    K 4.0 04/07/2019    K 3.7 04/02/2019    CREATININE 0.5 04/09/2019    CREATININE 0.8 04/07/2019    CREATININE 0.7 04/02/2019     Lab Results   Component Value Date    WBC 17.15 (H) 04/07/2019    WBC 13.59 (H) 03/28/2019    WBC 12.92 (H) 03/27/2019    HCT 31.5 (L) 04/07/2019    HCT 34.2 (L) 03/28/2019    HCT 35.0 (L) 03/27/2019     (H) 04/07/2019     (H) 03/28/2019     (H) 03/27/2019     No results found for: HGBA1C     IMAGING:  Per outside medical records, he had a previous left lower extremity bypass with some aortic bifemoral stent two years ago at [facility].   CTA of bilateral lower extremity shows complete thrombosis of aortic stent grafts, thrombosis of left complete femoral to distal superficial femoral stent graft.     IMP/PLAN:  59 y.o. male with h/o HTN, arthritis, and cerebral palsy, tobacco abuse, PVD with previous aorto-bifem and  L fem/pop in [place] - underwent emergent L AKA for a paralyzed and retracted L foot with no motor/sensory.  I noted intra-op only one lateral PFA branch that was patent; no cautery was used.  Now comes with some fat necrosis L AKA stump and ischemic R  Foot rest pain.  Family does not want to return to the outside surgeon who has done previous bypasses.  Pelvic collaterals via right IIA which then ultimately collateralizes left PFA, though diminutive    Not surprisingly due to occluded aorto-bifem and poor pelvic collateral flow from the R IIA to the L PFA, he now comes with some fat necrosis L AKA stump and ischemic R  Foot rest pain. Needs admit for hydration, full labs and R AKA, L AKA stump washout / wound vac.  If L AKA cannot be managed with wound vac, will need future conversion to L hip disarticulation.     Eligio Olmos MD FACS  Vascular/Endovascular Surgery

## 2019-04-10 NOTE — PLAN OF CARE
Problem: Skin Injury Risk Increased  Goal: Skin Health and Integrity  Outcome: Ongoing (interventions implemented as appropriate)  Patient able to independently turn and reposition. Left stump dressing changed to keep drainage away from skin. Surgery in am to place vac to stump on schedule.

## 2019-04-11 ENCOUNTER — ANESTHESIA (OUTPATIENT)
Dept: SURGERY | Facility: HOSPITAL | Age: 59
DRG: 240 | End: 2019-04-11
Payer: MEDICARE

## 2019-04-11 LAB
ALBUMIN SERPL BCP-MCNC: 2.6 G/DL (ref 3.5–5.2)
ALP SERPL-CCNC: 147 U/L (ref 55–135)
ALT SERPL W/O P-5'-P-CCNC: 26 U/L (ref 10–44)
ANION GAP SERPL CALC-SCNC: 8 MMOL/L (ref 8–16)
AST SERPL-CCNC: 47 U/L (ref 10–40)
BASOPHILS # BLD AUTO: 0.06 K/UL (ref 0–0.2)
BASOPHILS NFR BLD: 0.6 % (ref 0–1.9)
BILIRUB SERPL-MCNC: 0.5 MG/DL (ref 0.1–1)
BUN SERPL-MCNC: 10 MG/DL (ref 6–20)
CALCIUM SERPL-MCNC: 9.5 MG/DL (ref 8.7–10.5)
CHLORIDE SERPL-SCNC: 99 MMOL/L (ref 95–110)
CO2 SERPL-SCNC: 29 MMOL/L (ref 23–29)
CREAT SERPL-MCNC: 0.6 MG/DL (ref 0.5–1.4)
DIFFERENTIAL METHOD: ABNORMAL
EOSINOPHIL # BLD AUTO: 0.5 K/UL (ref 0–0.5)
EOSINOPHIL NFR BLD: 4.8 % (ref 0–8)
ERYTHROCYTE [DISTWIDTH] IN BLOOD BY AUTOMATED COUNT: 13.7 % (ref 11.5–14.5)
EST. GFR  (AFRICAN AMERICAN): >60 ML/MIN/1.73 M^2
EST. GFR  (NON AFRICAN AMERICAN): >60 ML/MIN/1.73 M^2
GLUCOSE SERPL-MCNC: 92 MG/DL (ref 70–110)
GRAM STN SPEC: NORMAL
HCT VFR BLD AUTO: 31.1 % (ref 40–54)
HGB BLD-MCNC: 10.7 G/DL (ref 14–18)
IMM GRANULOCYTES # BLD AUTO: 0.03 K/UL (ref 0–0.04)
IMM GRANULOCYTES NFR BLD AUTO: 0.3 % (ref 0–0.5)
LYMPHOCYTES # BLD AUTO: 1.7 K/UL (ref 1–4.8)
LYMPHOCYTES NFR BLD: 17.6 % (ref 18–48)
MAGNESIUM SERPL-MCNC: 1.4 MG/DL (ref 1.6–2.6)
MCH RBC QN AUTO: 27.1 PG (ref 27–31)
MCHC RBC AUTO-ENTMCNC: 34.4 G/DL (ref 32–36)
MCV RBC AUTO: 79 FL (ref 82–98)
MONOCYTES # BLD AUTO: 1.1 K/UL (ref 0.3–1)
MONOCYTES NFR BLD: 11 % (ref 4–15)
NEUTROPHILS # BLD AUTO: 6.4 K/UL (ref 1.8–7.7)
NEUTROPHILS NFR BLD: 65.7 % (ref 38–73)
NRBC BLD-RTO: 0 /100 WBC
PHOSPHATE SERPL-MCNC: 3.8 MG/DL (ref 2.7–4.5)
PLATELET # BLD AUTO: 488 K/UL (ref 150–350)
PMV BLD AUTO: 10.6 FL (ref 9.2–12.9)
POTASSIUM SERPL-SCNC: 4 MMOL/L (ref 3.5–5.1)
PROT SERPL-MCNC: 7.1 G/DL (ref 6–8.4)
RBC # BLD AUTO: 3.95 M/UL (ref 4.6–6.2)
SODIUM SERPL-SCNC: 136 MMOL/L (ref 136–145)
WBC # BLD AUTO: 9.7 K/UL (ref 3.9–12.7)

## 2019-04-11 PROCEDURE — 63600175 PHARM REV CODE 636 W HCPCS: Performed by: STUDENT IN AN ORGANIZED HEALTH CARE EDUCATION/TRAINING PROGRAM

## 2019-04-11 PROCEDURE — 76942 ECHO GUIDE FOR BIOPSY: CPT | Performed by: STUDENT IN AN ORGANIZED HEALTH CARE EDUCATION/TRAINING PROGRAM

## 2019-04-11 PROCEDURE — 71000033 HC RECOVERY, INTIAL HOUR: Performed by: SURGERY

## 2019-04-11 PROCEDURE — 25000003 PHARM REV CODE 250: Performed by: STUDENT IN AN ORGANIZED HEALTH CARE EDUCATION/TRAINING PROGRAM

## 2019-04-11 PROCEDURE — D9220A PRA ANESTHESIA: Mod: ,,, | Performed by: ANESTHESIOLOGY

## 2019-04-11 PROCEDURE — 87015 SPECIMEN INFECT AGNT CONCNTJ: CPT

## 2019-04-11 PROCEDURE — 64448 NJX AA&/STRD FEM NRV NFS IMG: CPT | Mod: 59,RT,, | Performed by: ANESTHESIOLOGY

## 2019-04-11 PROCEDURE — 85025 COMPLETE CBC W/AUTO DIFF WBC: CPT

## 2019-04-11 PROCEDURE — 88311 DECALCIFY TISSUE: CPT | Mod: 26,,, | Performed by: PATHOLOGY

## 2019-04-11 PROCEDURE — 87077 CULTURE AEROBIC IDENTIFY: CPT

## 2019-04-11 PROCEDURE — 27596 PR AMPUTATE THIGH,RE-AMPUTATN: ICD-10-PCS | Mod: 78,51,LT, | Performed by: SURGERY

## 2019-04-11 PROCEDURE — 36000711: Performed by: SURGERY

## 2019-04-11 PROCEDURE — 88307 TISSUE EXAM BY PATHOLOGIST: CPT | Mod: 26,,, | Performed by: PATHOLOGY

## 2019-04-11 PROCEDURE — 88311 TISSUE SPECIMEN TO PATHOLOGY - SURGERY: ICD-10-PCS | Mod: 26,,, | Performed by: PATHOLOGY

## 2019-04-11 PROCEDURE — 37000008 HC ANESTHESIA 1ST 15 MINUTES: Performed by: SURGERY

## 2019-04-11 PROCEDURE — 64447: ICD-10-PCS | Mod: 59,LT,, | Performed by: ANESTHESIOLOGY

## 2019-04-11 PROCEDURE — 37000009 HC ANESTHESIA EA ADD 15 MINS: Performed by: SURGERY

## 2019-04-11 PROCEDURE — 87186 SC STD MICRODIL/AGAR DIL: CPT | Mod: 59

## 2019-04-11 PROCEDURE — 63600175 PHARM REV CODE 636 W HCPCS: Performed by: SURGERY

## 2019-04-11 PROCEDURE — C1729 CATH, DRAINAGE: HCPCS | Performed by: SURGERY

## 2019-04-11 PROCEDURE — 71000039 HC RECOVERY, EACH ADD'L HOUR: Performed by: SURGERY

## 2019-04-11 PROCEDURE — 64448: ICD-10-PCS | Mod: 59,RT,, | Performed by: ANESTHESIOLOGY

## 2019-04-11 PROCEDURE — 27590 AMPUTATE LEG AT THIGH: CPT | Mod: 79,59,RT, | Performed by: SURGERY

## 2019-04-11 PROCEDURE — 27201423 OPTIME MED/SURG SUP & DEVICES STERILE SUPPLY: Performed by: SURGERY

## 2019-04-11 PROCEDURE — S0030 INJECTION, METRONIDAZOLE: HCPCS | Performed by: STUDENT IN AN ORGANIZED HEALTH CARE EDUCATION/TRAINING PROGRAM

## 2019-04-11 PROCEDURE — 87075 CULTR BACTERIA EXCEPT BLOOD: CPT | Mod: 59

## 2019-04-11 PROCEDURE — 27800517 HC TRAY,EPIDURAL-CONTINUOUS: Performed by: STUDENT IN AN ORGANIZED HEALTH CARE EDUCATION/TRAINING PROGRAM

## 2019-04-11 PROCEDURE — 27000221 HC OXYGEN, UP TO 24 HOURS

## 2019-04-11 PROCEDURE — 76942 ECHO GUIDE FOR BIOPSY: CPT | Performed by: ANESTHESIOLOGY

## 2019-04-11 PROCEDURE — 63600175 PHARM REV CODE 636 W HCPCS

## 2019-04-11 PROCEDURE — 25000003 PHARM REV CODE 250: Performed by: SURGERY

## 2019-04-11 PROCEDURE — 83735 ASSAY OF MAGNESIUM: CPT

## 2019-04-11 PROCEDURE — 87070 CULTURE OTHR SPECIMN AEROBIC: CPT | Mod: 59

## 2019-04-11 PROCEDURE — 64448 NJX AA&/STRD FEM NRV NFS IMG: CPT | Performed by: STUDENT IN AN ORGANIZED HEALTH CARE EDUCATION/TRAINING PROGRAM

## 2019-04-11 PROCEDURE — 87206 SMEAR FLUORESCENT/ACID STAI: CPT | Mod: 91

## 2019-04-11 PROCEDURE — 94761 N-INVAS EAR/PLS OXIMETRY MLT: CPT

## 2019-04-11 PROCEDURE — 36415 COLL VENOUS BLD VENIPUNCTURE: CPT

## 2019-04-11 PROCEDURE — 64447 NJX AA&/STRD FEMORAL NRV IMG: CPT | Performed by: STUDENT IN AN ORGANIZED HEALTH CARE EDUCATION/TRAINING PROGRAM

## 2019-04-11 PROCEDURE — 88307 TISSUE EXAM BY PATHOLOGIST: CPT | Performed by: PATHOLOGY

## 2019-04-11 PROCEDURE — 88307 TISSUE SPECIMEN TO PATHOLOGY - SURGERY: ICD-10-PCS | Mod: 26,,, | Performed by: PATHOLOGY

## 2019-04-11 PROCEDURE — 87102 FUNGUS ISOLATION CULTURE: CPT

## 2019-04-11 PROCEDURE — D9220A PRA ANESTHESIA: ICD-10-PCS | Mod: ,,, | Performed by: ANESTHESIOLOGY

## 2019-04-11 PROCEDURE — 76942: ICD-10-PCS | Mod: 26,,, | Performed by: ANESTHESIOLOGY

## 2019-04-11 PROCEDURE — 87205 SMEAR GRAM STAIN: CPT | Mod: 59

## 2019-04-11 PROCEDURE — 27200710 HC EPIDURAL INFUSION PUMP SET: Performed by: STUDENT IN AN ORGANIZED HEALTH CARE EDUCATION/TRAINING PROGRAM

## 2019-04-11 PROCEDURE — 27590 PR AMPUTATE THIGH,THRU FEMUR: ICD-10-PCS | Mod: 79,59,RT, | Performed by: SURGERY

## 2019-04-11 PROCEDURE — 27596 AMPUTATION FOLLOW-UP SURGERY: CPT | Mod: 78,51,LT, | Performed by: SURGERY

## 2019-04-11 PROCEDURE — 84100 ASSAY OF PHOSPHORUS: CPT

## 2019-04-11 PROCEDURE — 27200750 HC INSULATED NEEDLE/ STIMUPLEX: Performed by: STUDENT IN AN ORGANIZED HEALTH CARE EDUCATION/TRAINING PROGRAM

## 2019-04-11 PROCEDURE — 64447 NJX AA&/STRD FEMORAL NRV IMG: CPT | Performed by: ANESTHESIOLOGY

## 2019-04-11 PROCEDURE — 80053 COMPREHEN METABOLIC PANEL: CPT

## 2019-04-11 PROCEDURE — 87116 MYCOBACTERIA CULTURE: CPT | Mod: 59

## 2019-04-11 PROCEDURE — 11000001 HC ACUTE MED/SURG PRIVATE ROOM

## 2019-04-11 PROCEDURE — 36000710: Performed by: SURGERY

## 2019-04-11 RX ORDER — ROCURONIUM BROMIDE 10 MG/ML
INJECTION, SOLUTION INTRAVENOUS
Status: DISCONTINUED | OUTPATIENT
Start: 2019-04-11 | End: 2019-04-11

## 2019-04-11 RX ORDER — CELECOXIB 200 MG/1
200 CAPSULE ORAL DAILY
Status: DISCONTINUED | OUTPATIENT
Start: 2019-04-12 | End: 2019-04-19

## 2019-04-11 RX ORDER — PROPOFOL 10 MG/ML
VIAL (ML) INTRAVENOUS
Status: DISCONTINUED | OUTPATIENT
Start: 2019-04-11 | End: 2019-04-11

## 2019-04-11 RX ORDER — ONDANSETRON 2 MG/ML
4 INJECTION INTRAMUSCULAR; INTRAVENOUS EVERY 12 HOURS PRN
Status: DISCONTINUED | OUTPATIENT
Start: 2019-04-11 | End: 2019-04-15

## 2019-04-11 RX ORDER — METOPROLOL TARTRATE 1 MG/ML
INJECTION, SOLUTION INTRAVENOUS
Status: DISCONTINUED | OUTPATIENT
Start: 2019-04-11 | End: 2019-04-11

## 2019-04-11 RX ORDER — HYDROMORPHONE HYDROCHLORIDE 1 MG/ML
0.2 INJECTION, SOLUTION INTRAMUSCULAR; INTRAVENOUS; SUBCUTANEOUS EVERY 5 MIN PRN
Status: COMPLETED | OUTPATIENT
Start: 2019-04-11 | End: 2019-04-11

## 2019-04-11 RX ORDER — ROPIVACAINE HYDROCHLORIDE 2 MG/ML
8 INJECTION, SOLUTION EPIDURAL; INFILTRATION; PERINEURAL CONTINUOUS
Status: DISCONTINUED | OUTPATIENT
Start: 2019-04-11 | End: 2019-04-15

## 2019-04-11 RX ORDER — SODIUM CHLORIDE 9 MG/ML
INJECTION, SOLUTION INTRAVENOUS CONTINUOUS
Status: DISCONTINUED | OUTPATIENT
Start: 2019-04-11 | End: 2019-04-11

## 2019-04-11 RX ORDER — CEFEPIME HYDROCHLORIDE 2 G/1
2 INJECTION, POWDER, FOR SOLUTION INTRAVENOUS
Status: DISCONTINUED | OUTPATIENT
Start: 2019-04-11 | End: 2019-04-12

## 2019-04-11 RX ORDER — BACITRACIN 50000 [IU]/1
INJECTION, POWDER, FOR SOLUTION INTRAMUSCULAR
Status: DISCONTINUED | OUTPATIENT
Start: 2019-04-11 | End: 2019-04-11

## 2019-04-11 RX ORDER — GLYCOPYRROLATE 0.2 MG/ML
INJECTION INTRAMUSCULAR; INTRAVENOUS
Status: DISCONTINUED | OUTPATIENT
Start: 2019-04-11 | End: 2019-04-11

## 2019-04-11 RX ORDER — ESMOLOL HYDROCHLORIDE 10 MG/ML
INJECTION INTRAVENOUS
Status: DISCONTINUED | OUTPATIENT
Start: 2019-04-11 | End: 2019-04-11

## 2019-04-11 RX ORDER — ROPIVACAINE HYDROCHLORIDE 5 MG/ML
INJECTION, SOLUTION EPIDURAL; INFILTRATION; PERINEURAL
Status: COMPLETED | OUTPATIENT
Start: 2019-04-11 | End: 2019-04-11

## 2019-04-11 RX ORDER — ACETAMINOPHEN 10 MG/ML
INJECTION, SOLUTION INTRAVENOUS
Status: DISCONTINUED | OUTPATIENT
Start: 2019-04-11 | End: 2019-04-11

## 2019-04-11 RX ORDER — HYDROMORPHONE HYDROCHLORIDE 1 MG/ML
INJECTION, SOLUTION INTRAMUSCULAR; INTRAVENOUS; SUBCUTANEOUS
Status: COMPLETED
Start: 2019-04-11 | End: 2019-04-11

## 2019-04-11 RX ORDER — KETOROLAC TROMETHAMINE 30 MG/ML
INJECTION, SOLUTION INTRAMUSCULAR; INTRAVENOUS
Status: DISCONTINUED | OUTPATIENT
Start: 2019-04-11 | End: 2019-04-11

## 2019-04-11 RX ORDER — PREGABALIN 50 MG/1
150 CAPSULE ORAL NIGHTLY
Status: DISCONTINUED | OUTPATIENT
Start: 2019-04-11 | End: 2019-04-19

## 2019-04-11 RX ORDER — ONDANSETRON 2 MG/ML
INJECTION INTRAMUSCULAR; INTRAVENOUS
Status: DISCONTINUED | OUTPATIENT
Start: 2019-04-11 | End: 2019-04-11

## 2019-04-11 RX ORDER — MIDAZOLAM HYDROCHLORIDE 1 MG/ML
INJECTION, SOLUTION INTRAMUSCULAR; INTRAVENOUS
Status: DISCONTINUED | OUTPATIENT
Start: 2019-04-11 | End: 2019-04-11

## 2019-04-11 RX ORDER — SODIUM CHLORIDE 0.9 % (FLUSH) 0.9 %
10 SYRINGE (ML) INJECTION
Status: DISCONTINUED | OUTPATIENT
Start: 2019-04-11 | End: 2019-04-25 | Stop reason: HOSPADM

## 2019-04-11 RX ORDER — NEOSTIGMINE METHYLSULFATE 1 MG/ML
INJECTION, SOLUTION INTRAVENOUS
Status: DISCONTINUED | OUTPATIENT
Start: 2019-04-11 | End: 2019-04-11

## 2019-04-11 RX ORDER — ZOLPIDEM TARTRATE 5 MG/1
5 TABLET ORAL NIGHTLY PRN
Status: DISCONTINUED | OUTPATIENT
Start: 2019-04-11 | End: 2019-04-25 | Stop reason: HOSPADM

## 2019-04-11 RX ORDER — CELECOXIB 200 MG/1
400 CAPSULE ORAL ONCE
Status: COMPLETED | OUTPATIENT
Start: 2019-04-11 | End: 2019-04-11

## 2019-04-11 RX ORDER — PHENYLEPHRINE HYDROCHLORIDE 10 MG/ML
INJECTION INTRAVENOUS
Status: DISCONTINUED | OUTPATIENT
Start: 2019-04-11 | End: 2019-04-11

## 2019-04-11 RX ORDER — KETAMINE HYDROCHLORIDE 10 MG/ML
INJECTION, SOLUTION INTRAMUSCULAR; INTRAVENOUS
Status: DISCONTINUED | OUTPATIENT
Start: 2019-04-11 | End: 2019-04-11

## 2019-04-11 RX ORDER — ACETAMINOPHEN 10 MG/ML
1000 INJECTION, SOLUTION INTRAVENOUS ONCE
Status: COMPLETED | OUTPATIENT
Start: 2019-04-11 | End: 2019-04-11

## 2019-04-11 RX ORDER — ACETAMINOPHEN 500 MG
1000 TABLET ORAL EVERY 6 HOURS
Status: DISPENSED | OUTPATIENT
Start: 2019-04-12 | End: 2019-04-14

## 2019-04-11 RX ORDER — METHOCARBAMOL 750 MG/1
750 TABLET, FILM COATED ORAL 3 TIMES DAILY
Status: COMPLETED | OUTPATIENT
Start: 2019-04-11 | End: 2019-04-12

## 2019-04-11 RX ORDER — FENTANYL CITRATE 50 UG/ML
INJECTION, SOLUTION INTRAMUSCULAR; INTRAVENOUS
Status: DISCONTINUED | OUTPATIENT
Start: 2019-04-11 | End: 2019-04-11

## 2019-04-11 RX ORDER — LIDOCAINE HCL/PF 100 MG/5ML
SYRINGE (ML) INTRAVENOUS
Status: DISCONTINUED | OUTPATIENT
Start: 2019-04-11 | End: 2019-04-11

## 2019-04-11 RX ADMIN — ACETAMINOPHEN 1000 MG: 10 INJECTION, SOLUTION INTRAVENOUS at 08:04

## 2019-04-11 RX ADMIN — OXYCODONE HYDROCHLORIDE 10 MG: 10 TABLET ORAL at 05:04

## 2019-04-11 RX ADMIN — VANCOMYCIN HYDROCHLORIDE 1000 MG: 1 INJECTION, POWDER, LYOPHILIZED, FOR SOLUTION INTRAVENOUS at 10:04

## 2019-04-11 RX ADMIN — MIDAZOLAM HYDROCHLORIDE 1 MG: 1 INJECTION, SOLUTION INTRAMUSCULAR; INTRAVENOUS at 11:04

## 2019-04-11 RX ADMIN — PHENYLEPHRINE HYDROCHLORIDE 100 MCG: 10 INJECTION INTRAVENOUS at 01:04

## 2019-04-11 RX ADMIN — HYDROMORPHONE HYDROCHLORIDE 0.2 MG: 1 INJECTION, SOLUTION INTRAMUSCULAR; INTRAVENOUS; SUBCUTANEOUS at 03:04

## 2019-04-11 RX ADMIN — METOPROLOL TARTRATE 1 MG: 5 INJECTION, SOLUTION INTRAVENOUS at 01:04

## 2019-04-11 RX ADMIN — HYDROMORPHONE HYDROCHLORIDE 0.2 MG: 1 INJECTION, SOLUTION INTRAMUSCULAR; INTRAVENOUS; SUBCUTANEOUS at 02:04

## 2019-04-11 RX ADMIN — FENTANYL CITRATE 50 MCG: 50 INJECTION, SOLUTION INTRAMUSCULAR; INTRAVENOUS at 11:04

## 2019-04-11 RX ADMIN — CEFAZOLIN 1 G: 1 INJECTION, POWDER, FOR SOLUTION INTRAMUSCULAR; INTRAVENOUS at 02:04

## 2019-04-11 RX ADMIN — ESMOLOL HYDROCHLORIDE 80 MG: 10 INJECTION INTRAVENOUS at 11:04

## 2019-04-11 RX ADMIN — METRONIDAZOLE 500 MG: 500 INJECTION, SOLUTION INTRAVENOUS at 06:04

## 2019-04-11 RX ADMIN — CEFEPIME 2 G: 2 INJECTION, POWDER, FOR SOLUTION INTRAVENOUS at 03:04

## 2019-04-11 RX ADMIN — METOPROLOL TARTRATE 1 MG: 5 INJECTION, SOLUTION INTRAVENOUS at 12:04

## 2019-04-11 RX ADMIN — SODIUM CHLORIDE, SODIUM GLUCONATE, SODIUM ACETATE, POTASSIUM CHLORIDE, MAGNESIUM CHLORIDE, SODIUM PHOSPHATE, DIBASIC, AND POTASSIUM PHOSPHATE: .53; .5; .37; .037; .03; .012; .00082 INJECTION, SOLUTION INTRAVENOUS at 11:04

## 2019-04-11 RX ADMIN — ROPIVACAINE HYDROCHLORIDE 10 ML: 5 INJECTION, SOLUTION EPIDURAL; INFILTRATION; PERINEURAL at 11:04

## 2019-04-11 RX ADMIN — KETAMINE HYDROCHLORIDE 30 MG: 10 INJECTION, SOLUTION INTRAMUSCULAR; INTRAVENOUS at 12:04

## 2019-04-11 RX ADMIN — PHENYLEPHRINE HYDROCHLORIDE 100 MCG: 10 INJECTION INTRAVENOUS at 11:04

## 2019-04-11 RX ADMIN — METHOCARBAMOL TABLETS 750 MG: 750 TABLET, COATED ORAL at 02:04

## 2019-04-11 RX ADMIN — OXYCODONE HYDROCHLORIDE 10 MG: 10 TABLET ORAL at 06:04

## 2019-04-11 RX ADMIN — ACETAMINOPHEN 1000 MG: 10 INJECTION, SOLUTION INTRAVENOUS at 12:04

## 2019-04-11 RX ADMIN — CEFEPIME 2 G: 2 INJECTION, POWDER, FOR SOLUTION INTRAVENOUS at 11:04

## 2019-04-11 RX ADMIN — FENTANYL CITRATE 50 MCG: 50 INJECTION, SOLUTION INTRAMUSCULAR; INTRAVENOUS at 12:04

## 2019-04-11 RX ADMIN — NEOSTIGMINE METHYLSULFATE 4 MG: 1 INJECTION INTRAVENOUS at 01:04

## 2019-04-11 RX ADMIN — KETOROLAC TROMETHAMINE 30 MG: 30 INJECTION, SOLUTION INTRAMUSCULAR; INTRAVENOUS at 01:04

## 2019-04-11 RX ADMIN — CELECOXIB 400 MG: 200 CAPSULE ORAL at 02:04

## 2019-04-11 RX ADMIN — SODIUM CHLORIDE: 0.9 INJECTION, SOLUTION INTRAVENOUS at 10:04

## 2019-04-11 RX ADMIN — LIDOCAINE HYDROCHLORIDE 100 MG: 20 INJECTION, SOLUTION INTRAVENOUS at 11:04

## 2019-04-11 RX ADMIN — ROPIVACAINE HYDROCHLORIDE 8 ML/HR: 2 INJECTION, SOLUTION EPIDURAL; INFILTRATION at 02:04

## 2019-04-11 RX ADMIN — METHOCARBAMOL TABLETS 750 MG: 750 TABLET, COATED ORAL at 08:04

## 2019-04-11 RX ADMIN — ZOLPIDEM TARTRATE 5 MG: 5 TABLET ORAL at 09:04

## 2019-04-11 RX ADMIN — CEFAZOLIN 1 G: 1 INJECTION, POWDER, FOR SOLUTION INTRAMUSCULAR; INTRAVENOUS at 06:04

## 2019-04-11 RX ADMIN — ONDANSETRON 4 MG: 2 INJECTION INTRAMUSCULAR; INTRAVENOUS at 01:04

## 2019-04-11 RX ADMIN — PHENYLEPHRINE HYDROCHLORIDE 100 MCG: 10 INJECTION INTRAVENOUS at 12:04

## 2019-04-11 RX ADMIN — FENTANYL CITRATE 25 MCG: 50 INJECTION, SOLUTION INTRAMUSCULAR; INTRAVENOUS at 01:04

## 2019-04-11 RX ADMIN — METRONIDAZOLE 500 MG: 500 INJECTION, SOLUTION INTRAVENOUS at 02:04

## 2019-04-11 RX ADMIN — ROPIVACAINE HYDROCHLORIDE 20 ML: 5 INJECTION, SOLUTION EPIDURAL; INFILTRATION; PERINEURAL at 11:04

## 2019-04-11 RX ADMIN — CEFAZOLIN 1 G: 1 INJECTION, POWDER, FOR SOLUTION INTRAMUSCULAR; INTRAVENOUS at 11:04

## 2019-04-11 RX ADMIN — FENTANYL CITRATE 50 MCG: 50 INJECTION INTRAMUSCULAR; INTRAVENOUS at 11:04

## 2019-04-11 RX ADMIN — OXYCODONE HYDROCHLORIDE 10 MG: 10 TABLET ORAL at 02:04

## 2019-04-11 RX ADMIN — METRONIDAZOLE 500 MG: 500 INJECTION, SOLUTION INTRAVENOUS at 11:04

## 2019-04-11 RX ADMIN — ESMOLOL HYDROCHLORIDE 20 MG: 10 INJECTION INTRAVENOUS at 11:04

## 2019-04-11 RX ADMIN — METOPROLOL SUCCINATE 50 MG: 50 TABLET, EXTENDED RELEASE ORAL at 08:04

## 2019-04-11 RX ADMIN — PHENYLEPHRINE HYDROCHLORIDE 50 MCG: 10 INJECTION INTRAVENOUS at 01:04

## 2019-04-11 RX ADMIN — PROPOFOL 200 MG: 10 INJECTION, EMULSION INTRAVENOUS at 11:04

## 2019-04-11 RX ADMIN — PREGABALIN 150 MG: 50 CAPSULE ORAL at 08:04

## 2019-04-11 RX ADMIN — GLYCOPYRROLATE 0.6 MG: 0.2 INJECTION, SOLUTION INTRAMUSCULAR; INTRAVENOUS at 01:04

## 2019-04-11 RX ADMIN — OXYCODONE HYDROCHLORIDE 10 MG: 10 TABLET ORAL at 01:04

## 2019-04-11 RX ADMIN — METOPROLOL TARTRATE 1 MG: 5 INJECTION, SOLUTION INTRAVENOUS at 11:04

## 2019-04-11 RX ADMIN — PHENYLEPHRINE HYDROCHLORIDE 200 MCG: 10 INJECTION INTRAVENOUS at 01:04

## 2019-04-11 RX ADMIN — ROCURONIUM BROMIDE 40 MG: 10 INJECTION, SOLUTION INTRAVENOUS at 11:04

## 2019-04-11 NOTE — H&P
Pranay Colindres  04/10/2019     HPI:  Patient is a 59 y.o. male with a h/o HTN, arthritis, and cerebral palsy, known PAD s/p previous aorto-bifem and L fem/pop tobacco abuse who presented to the ED 3/20/19 as a transfer from [facility] with a complaint of pulseless left foot; underwent emergent L AKA.  By history, it likely occluded in early March 2019 (he was ambulating in early March); in the ER, he had a retracted L foot with no motor/sensory.  I noted intra-op only one lateral PFA branch that was patent; no cautery was used.  Now comes with some fat necrosis L AKA stump and ischemic R  Foot rest pain.     Prior history of Aorto-bifem for what sounds like aorto-iliac occlusive disease and prior left Fem-AK pop bypass by Dr. Jose Lan in [place], [place]. Prior to admission, he had progressive left leg pain for past 2-3 weeks and was found in the ER to be unable to wiggle toes at baseline.      Tobacco use: Current smoker though states quick in mid-March 2019          Past Medical History:   Diagnosis Date    Allergy      Arthritis      Hypertension      Neuropathy              Past Surgical History:   Procedure Laterality Date    ABDOMINAL SURGERY        AMPUTATION         right foot 5th digit    AMPUTATION, ABOVE KNEE Left 3/20/2019     Performed by Eligio Olmos MD at [facility] 2ND F[facility]    COLONOSCOPY   04/23/2018    COLONOSCOPY N/A 4/23/2018     Performed by Jeramy [facility]elan MD at [facility] ENDO    ESOPHAGOGASTRODUODENOSCOPY   04/23/2018    ESOPHAGOGASTRODUODENOSCOPY (EGD) N/A 4/23/2018     Performed by Jeramy Castelan MD at [facility] ENDO    HIP SURGERY Bilateral       x2      History reviewed. No pertinent family history.  Social History               Socioeconomic History    Marital status: Single       Spouse name: Not on file    Number of children: Not on file    Years of education: Not on file    Highest education level: Not on file   Occupational History    Not on file   Social  Needs    Financial resource strain: Not on file    Food insecurity:       Worry: Not on file       Inability: Not on file    Transportation needs:       Medical: Not on file       Non-medical: Not on file   Tobacco Use    Smoking status: Former Smoker       Types: Cigarettes       Last attempt to quit: 3/12/2019       Years since quittin.0    Smokeless tobacco: Never Used   Substance and Sexual Activity    Alcohol use: Yes       Comment: 1-2    Drug use: No    Sexual activity: Not on file   Lifestyle    Physical activity:       Days per week: Not on file       Minutes per session: Not on file    Stress: Not on file   Relationships    Social connections:       Talks on phone: Not on file       Gets together: Not on file       Attends [employer] service: Not on file       Active member of club or organization: Not on file       Attends meetings of clubs or organizations: Not on file       Relationship status: Not on file   Other Topics Concern    Not on file   Social History Narrative    Not on file            Current Outpatient Medications:     amLODIPine (NORVASC) 10 MG tablet, Take 1 tablet (10 mg total) by mouth once daily., Disp: 30 tablet, Rfl: 3    apixaban (ELIQUIS) 2.5 mg Tab, Take 1 tablet (2.5 mg total) by mouth 2 (two) times daily., Disp: 60 tablet, Rfl: 3    aspirin (ECOTRIN) 81 MG EC tablet, Take 1 tablet (81 mg total) by mouth once daily., Disp: 30 tablet, Rfl: 11    atorvastatin (LIPITOR) 40 MG tablet, Take 1 tablet (40 mg total) by mouth once daily., Disp: 90 tablet, Rfl: 3    HYDROcodone-acetaminophen (NORCO) 7.5-325 mg per tablet, Take 1 tablet by mouth every 4 (four) hours as needed for Pain., Disp: 20 tablet, Rfl: 0    latanoprost 0.005 % ophthalmic solution, , Disp: , Rfl:     metoprolol succinate (TOPROL-XL) 50 MG 24 hr tablet, Take 50 mg by mouth once daily., Disp: , Rfl:     pantoprazole (PROTONIX) 40 MG tablet, , Disp: , Rfl:     senna (SENOKOT) 8.6 mg tablet, Take 1  tablet by mouth once daily., Disp: , Rfl:     traMADol (ULTRAM) 50 mg tablet, Take 1 tablet (50 mg total) by mouth every 6 (six) hours as needed for Pain., Disp: 3 tablet, Rfl: 0    gabapentin (NEURONTIN) 300 MG capsule, Take 1 capsule (300 mg total) by mouth every evening., Disp: 30 capsule, Rfl: 6     REVIEW OF SYSTEMS:  General: negative; ENT: negative; Allergy and Immunology: negative; Hematological and Lymphatic: Negative; Endocrine: negative; Respiratory: no cough, shortness of breath, or wheezing; Cardiovascular: no chest pain or dyspnea on exertion; Gastrointestinal: no abdominal pain/back, change in bowel habits, or bloody stools; Genito-Urinary: no dysuria, trouble voiding, or hematuria; Musculoskeletal: negative  Neurological: no TIA or stroke symptoms; Psychiatric: no nervousness, anxiety or depression.     PHYSICAL EXAM:   Right Arm BP - Sittin/82 (04/10/19 1053)  Left Arm BP - Sittin/84 (04/10/19 1053)  Pulse: 104  Temp: 98.3 °F (36.8 °C)       General appearance:      Alert, well-appearing, and in no distress.  Oriented to person, place, and time                 Neurological:      Normal speech, no focal findings noted; CN II - XII grossly intact           Musculoskeletal:      Digits/nail without cyanosis/clubbing.  Normal muscle strength/tone.                               Neck:     Supple, no significant adenopathy; thyroid is not enlarged                                               No carotid bruit can be auscultated                              Chest:     Clear to auscultation, no wheezes, rales or rhonchi, symmetric air entry                                               No use of accessory muscles                           Cardiac:     Normal rate and regular rhythm, S1 and S2 normal; PMI non-displaced                        Abdomen:     Soft, nontender, nondistended, no masses or organomegaly                                               No rebound tenderness noted; bowel  sounds normal                                               Pulsatile aortic mass is not palpable.                                               No groin adenopathy                    Extremities:      No femoral pulses bilaterally                                               L AKA stump - with some edema and evidence of suture line fat necrosis; medial and lateral tip ~2-3mm have dry necrosis                                               R foot is chronically ischemic and   LAB RESULTS:        Lab Results   Component Value Date     K 3.9 04/09/2019     K 4.0 04/07/2019     K 3.7 04/02/2019     CREATININE 0.5 04/09/2019     CREATININE 0.8 04/07/2019     CREATININE 0.7 04/02/2019            Lab Results   Component Value Date     WBC 17.15 (H) 04/07/2019     WBC 13.59 (H) 03/28/2019     WBC 12.92 (H) 03/27/2019     HCT 31.5 (L) 04/07/2019     HCT 34.2 (L) 03/28/2019     HCT 35.0 (L) 03/27/2019      (H) 04/07/2019      (H) 03/28/2019      (H) 03/27/2019      No results found for: HGBA1C      IMAGING:  Per outside medical records, he had a previous left lower extremity bypass with some aortic bifemoral stent two years ago at [facility].   CTA of bilateral lower extremity shows complete thrombosis of aortic stent grafts, thrombosis of left complete femoral to distal superficial femoral stent graft.     IMP/PLAN:  59 y.o. male with h/o HTN, arthritis, and cerebral palsy, tobacco abuse, PVD with previous aorto-bifem and  L fem/pop in [place] - underwent emergent L AKA for a paralyzed and retracted L foot with no motor/sensory.  I noted intra-op only one lateral PFA branch that was patent; no cautery was used.  Now comes with some fat necrosis L AKA stump and ischemic R  Foot rest pain.  Family does not want to return to the outside surgeon who has done previous bypasses.  Pelvic collaterals via right IIA which then ultimately collateralizes left PFA, though diminutive     Not surprisingly due to  occluded aorto-bifem and poor pelvic collateral flow from the R IIA to the L PFA, he now comes with some fat necrosis L AKA stump and ischemic R  Foot rest pain. Needs admit for hydration, full labs and R AKA, L AKA stump washout / wound vac.  If L AKA cannot be managed with wound vac, will need future conversion to L hip disarticulation.      Eligio Olmos MD FACS  Vascular/Endovascular Surgery

## 2019-04-11 NOTE — NURSING
Spoke with dr matteo montez to inform him that the right stomp was reinforced with gauze, stated will be by to see pt.

## 2019-04-11 NOTE — NURSING
Pt arrived back to the unit via hospital bed, awake, alert resp even and unlabored. Skin warm and dry to touch,. Wound vac intact to left stump .Transparent  Dressing intact to right residual limb . Safety measures in place.

## 2019-04-11 NOTE — PROGRESS NOTES
1020-Dr. Ronquillo notified that floor nurse could not find surgical or blood consent. He verbalized understanding and stated he would get all new consents at this time.

## 2019-04-11 NOTE — BRIEF OP NOTE
"[facility]  Brief Operative Note    SUMMARY     Surgery Date: 4/11/2019     Surgeon(s) and Role:     * Thalia Moreno MD - Primary    Assisting Surgeon: Jamir Braun MD    Pre-op Diagnosis:   Atherosclerosis with Ischemic Rest Pain, Right foot     Infected Left Above Knee amputation     Thrombosed Aorto-Femoral Bypass    Post-op Diagnosis:  Same    Procedure:  Right Above Knee amputation     Left Above Knee Amputation - open     VAC placement - Left Above Knee amputation, 250 sq cm    Anesthesia:   General    Description of Procedure: Right AKA for ischemic rest pain with non-reconstructible vascular disease. Infected Left AK stump required reamputation 3-4" higher. Left open with VAC dressing    Description of the findings of the procedure: Purulent drainage and fat necrosis in left AK. Resected to healthy bleeding tissue     Estimated Blood Loss: 100mL         Specimens:   Specimen (12h ago, onward)    Start     Ordered    04/11/19 1337  Specimen to Pathology - Surgery  Once     Comments:  #1 RIGHT ABOVE THE KNEE AMPUTATION-DIRTY AND INFECTED#2 LEFT ABOVE THE KNEE AMPUTATION-DIRTY AND INFECTEDOR #5     Start Status     04/11/19 1337 Collected (04/11/19 1339) Order ID: [id number]       04/11/19 1339        "

## 2019-04-11 NOTE — SUBJECTIVE & OBJECTIVE
Medications:  Continuous Infusions:   lactated ringers 100 mL/hr at 04/10/19 1609     Scheduled Meds:   ceFAZolin (ANCEF) IVPB  1 g Intravenous Q8H    metoprolol succinate  50 mg Oral Daily    metronidazole  500 mg Intravenous Q8H     PRN Meds:acetaminophen, acetaminophen, fentaNYL, midazolam, ondansetron, oxyCODONE, oxyCODONE, promethazine (PHENERGAN) IVPB, sodium chloride 0.9%, vancomycin (VANCOCIN) IVPB     Objective:     Vital Signs (Most Recent):  Temp: 97.9 °F (36.6 °C) (04/11/19 0759)  Pulse: 88 (04/11/19 0759)  Resp: 16 (04/11/19 0759)  BP: (!) 164/83 (04/11/19 0759)  SpO2: 97 % (04/11/19 0403) Vital Signs (24h Range):  Temp:  [96.5 °F (35.8 °C)-98.6 °F (37 °C)] 97.9 °F (36.6 °C)  Pulse:  [] 88  Resp:  [16-18] 16  SpO2:  [95 %-97 %] 97 %  BP: (127-164)/(75-87) 164/83         Physical Exam   Constitutional: He is oriented to person, place, and time. He appears well-developed and well-nourished.   Neck: No JVD present. No tracheal deviation present.   Cardiovascular: Normal rate and regular rhythm.   Pulmonary/Chest: Effort normal. No respiratory distress.   Abdominal: Soft. He exhibits no distension. There is no tenderness. There is no guarding.   Musculoskeletal: He exhibits no edema.   L AKA with sanguinopurulent drainage. Staples in place.  Right leg without palpable DP or PT pulses, edematous and violaceous foot. No motor nor sensation intact.   Neurological: He is alert and oriented to person, place, and time.   Skin: Skin is warm and dry.   Nursing note and vitals reviewed.      Significant Labs:  CBC:   Recent Labs   Lab 04/11/19  0548   WBC 9.70   RBC 3.95*   HGB 10.7*   HCT 31.1*   *   MCV 79*   MCH 27.1   MCHC 34.4     CMP:   Recent Labs   Lab 04/11/19  0548   GLU 92   CALCIUM 9.5   ALBUMIN 2.6*   PROT 7.1      K 4.0   CO2 29   CL 99   BUN 10   CREATININE 0.6   ALKPHOS 147*   ALT 26   AST 47*   BILITOT 0.5       Significant Diagnostics:  I have reviewed all pertinent imaging  results/findings within the past 24 hours.

## 2019-04-11 NOTE — PROGRESS NOTES
[facility]  Vascular Surgery  Progress Note    Patient Name: Pranay Colindres  MRN: [id number]  Admission Date: 4/10/2019  Primary Care Provider: Jenaro Torres MD    Subjective:     Interval History: No acute events overnight.  To OR today for L AKA washout with wound vac and new Right AKA.     Post-Op Info:  Procedure(s) (LRB):  AMPUTATION, ABOVE KNEE (Right)  DEBRIDEMENT, WOUND (Left)  APPLICATION, WOUND VAC (Left)           Medications:  Continuous Infusions:   lactated ringers 100 mL/hr at 04/10/19 1609     Scheduled Meds:   ceFAZolin (ANCEF) IVPB  1 g Intravenous Q8H    metoprolol succinate  50 mg Oral Daily    metronidazole  500 mg Intravenous Q8H     PRN Meds:acetaminophen, acetaminophen, fentaNYL, midazolam, ondansetron, oxyCODONE, oxyCODONE, promethazine (PHENERGAN) IVPB, sodium chloride 0.9%, vancomycin (VANCOCIN) IVPB     Objective:     Vital Signs (Most Recent):  Temp: 97.9 °F (36.6 °C) (04/11/19 0759)  Pulse: 88 (04/11/19 0759)  Resp: 16 (04/11/19 0759)  BP: (!) 164/83 (04/11/19 0759)  SpO2: 97 % (04/11/19 0403) Vital Signs (24h Range):  Temp:  [96.5 °F (35.8 °C)-98.6 °F (37 °C)] 97.9 °F (36.6 °C)  Pulse:  [] 88  Resp:  [16-18] 16  SpO2:  [95 %-97 %] 97 %  BP: (127-164)/(75-87) 164/83         Physical Exam   Constitutional: He is oriented to person, place, and time. He appears well-developed and well-nourished.   Neck: No JVD present. No tracheal deviation present.   Cardiovascular: Normal rate and regular rhythm.   Pulmonary/Chest: Effort normal. No respiratory distress.   Abdominal: Soft. He exhibits no distension. There is no tenderness. There is no guarding.   Musculoskeletal: He exhibits no edema.   L AKA with sanguinopurulent drainage. Staples in place.  Right leg without palpable DP or PT pulses, edematous and violaceous foot. No motor nor sensation intact.   Neurological: He is alert and oriented to person, place, and time.   Skin: Skin is warm and dry.   Nursing  note and vitals reviewed.      Significant Labs:  CBC:   Recent Labs   Lab 04/11/19  0548   WBC 9.70   RBC 3.95*   HGB 10.7*   HCT 31.1*   *   MCV 79*   MCH 27.1   MCHC 34.4     CMP:   Recent Labs   Lab 04/11/19  0548   GLU 92   CALCIUM 9.5   ALBUMIN 2.6*   PROT 7.1      K 4.0   CO2 29   CL 99   BUN 10   CREATININE 0.6   ALKPHOS 147*   ALT 26   AST 47*   BILITOT 0.5       Significant Diagnostics:  I have reviewed all pertinent imaging results/findings within the past 24 hours.    Assessment/Plan:     Critical lower limb ischemia  59 y.o. male with h/o HTN, arthritis, and cerebral palsy, tobacco abuse, PVD with previous aorto-bifem and L fem/pop. S/p emergent L AKA 3/20/19. Presents with fat necrosis L AKA stump and ischemic R foot rest pain.    - NPO  - To OR today for L AKA washout/debridement with wound vac placement and Right AKA  - Consent obtained  - Broad spectrum abx: ancef, flagyl. Vanc periop.  - mIVF  - pain control prn              Denisha Chambers MD  Vascular Surgery  [facility]

## 2019-04-11 NOTE — PLAN OF CARE
Extended Emergency Contact Information  Primary Emergency Contact: Jessica Colindres   [facility]  Home Phone: [phone]  Mobile Phone: [phone]  Relation: Other    Jenaro Torres MD  [address] [facility] [facility] [address]    Payor: HU[employer] MANAGED MEDICARE / Plan: HUMANA SNP (SPECIAL NEEDS PLAN) / Product Type: Medicare Advantage /       [place] - [address] [address]  [address] [place] [address]  Phone: [phone] Fax: [phone]       04/11/19 1522   Discharge Assessment   Confirmed/corrected address and phone number on facesheet? No   Assessment information obtained from? Medical Record   Expected Length of Stay (days) 4   Communicated expected length of stay with patient/caregiver no   Prior to hospitilization cognitive status: Unable to Assess   Prior to hospitalization functional status: Assistive Equipment   Current cognitive status: Unable to Assess   Current Functional Status: Assistive Equipment   Lives With sibling(s)   Able to Return to Prior Arrangements yes   Is patient able to care for self after discharge? Unable to determine at this time (comments)   Readmission Within the Last 30 Days previous discharge plan unsuccessful   Equipment Currently Used at Home walker, standard   Discharge Plan A Rehab   Discharge Plan B Skilled Nursing Facility   Patient/Family in Agreement with Plan unable to assess

## 2019-04-11 NOTE — PROGRESS NOTES
1015-Dr. Olmos's resident notified that pt does not have a consent. She stated that blood and surgical consent was signed last night. Juan J, nurse on 5th floor called and notified that consents did not make it down to dosc with pt. She stated there were no consents in the chart on the floor at this time.

## 2019-04-11 NOTE — NURSING TRANSFER
Nursing Transfer Note      4/11/2019     Transfer To: [id number]    Transfer via bed    Transfer with room air    Transported by nurse and patient escort    Medicines sent: yes    Chart send with patient: Yes    Notified: friend

## 2019-04-11 NOTE — ASSESSMENT & PLAN NOTE
59 y.o. male with h/o HTN, arthritis, and cerebral palsy, tobacco abuse, PVD with previous aorto-bifem and L fem/pop. S/p emergent L AKA 3/20/19. Presents with fat necrosis L AKA stump and ischemic R foot rest pain.    - NPO  - To OR today for L AKA washout/debridement with wound vac placement and Right AKA  - Consent obtained  - Broad spectrum abx: ancef, flagyl. Vanc periop.  - mIVF  - pain control prn

## 2019-04-11 NOTE — ANESTHESIA PROCEDURE NOTES
Femoral Nerve Catheter (RIGHT LEG)    Patient location during procedure: pre-op   Block not for primary anesthetic.  Reason for block: at surgeon's request and post-op pain management   Post-op Pain Location: R leg pain  Start time: 4/11/2019 11:00 AM  Timeout: 4/11/2019 11:00 AM   End time: 4/11/2019 11:10 AM  Staffing  Anesthesiologist: Candy Edouard MD  Resident/CRNA: Ignacio Nguyen MD  Performed: resident/CRNA   Preanesthetic Checklist  Completed: patient identified, site marked, surgical consent, pre-op evaluation, timeout performed, IV checked, risks and benefits discussed and monitors and equipment checked  Peripheral Block  Patient position: supine  Prep: ChloraPrep and site prepped and draped  Patient monitoring: heart rate, cardiac monitor, continuous pulse ox, continuous capnometry and frequent blood pressure checks  Block type: femoral  Laterality: right  Injection technique: continuous  Needle  Needle type: Tuohy   Needle gauge: 17 G  Needle length: 3.5 in  Needle localization: anatomical landmarks and ultrasound guidance  Catheter type: spring wound  Catheter size: 19 G  Test dose: lidocaine 1.5% with Epi 1-to-200,000 and negative   -ultrasound image captured on disc.  Assessment  Injection assessment: negative aspiration, negative parasthesia and local visualized surrounding nerve  Paresthesia pain: none  Heart rate change: no  Slow fractionated injection: yes  Additional Notes  VSS.  [facility] RN monitoring vitals throughout procedure.  Patient tolerated procedure well.     30 cc of 0.25% of ropivacaine w/ epi used for block

## 2019-04-11 NOTE — ANESTHESIA PROCEDURE NOTES
Femoral Nerve Single Injection Block (LEFT LEG)    Patient location during procedure: pre-op   Block not for primary anesthetic.  Reason for block: at surgeon's request and post-op pain management   Post-op Pain Location: Left leg pain  Start time: 4/11/2019 11:00 AM  Timeout: 4/11/2019 11:00 AM   End time: 4/11/2019 11:05 AM  Staffing  Anesthesiologist: Candy Edouard MD  Performed: anesthesiologist   Preanesthetic Checklist  Completed: patient identified, site marked, surgical consent, pre-op evaluation, timeout performed, IV checked, risks and benefits discussed and monitors and equipment checked  Peripheral Block  Patient position: supine  Prep: ChloraPrep  Patient monitoring: heart rate, cardiac monitor, continuous pulse ox, continuous capnometry and frequent blood pressure checks  Block type: femoral  Laterality: left  Injection technique: single shot  Needle  Needle type: Stimuplex   Needle gauge: 22 G  Needle length: 2 in  Needle localization: anatomical landmarks and ultrasound guidance   -ultrasound image captured on disc.  Assessment  Injection assessment: negative aspiration, negative parasthesia and local visualized surrounding nerve  Paresthesia pain: none  Heart rate change: no  Slow fractionated injection: yes  Additional Notes  VSS.  [facility] RN monitoring vitals throughout procedure.  Patient tolerated procedure well.     20 cc of 0.25% ropivacaine used for block.

## 2019-04-11 NOTE — OP NOTE
"4/11/2019    Surgeon(s) and Role:     * Thalia Moreno MD - Primary     Assisting Surgeon: Jamir Braun MD     Pre-op Diagnosis:     Atherosclerosis with Ischemic Rest Pain, Right foot                                      Infected Left Above Knee amputation                                      Thrombosed Aorto-Femoral Bypass     Post-op Diagnosis:  Same     Procedure:                  Right Above Knee amputation                                      Left Above Knee Amputation - open                                      VAC placement - Left Above Knee amputation, 250 sq cm     Anesthesia:                General     Description of Procedure: Right AKA for ischemic rest pain with non-reconstructible vascular disease. Infected Left AK stump required reamputation 3-4" higher. Left open with VAC dressing     Description of the findings of the procedure: Purulent drainage and fat necrosis in left AK. Resected to healthy bleeding tissue      Estimated Blood Loss: 100mL    Complications:  None; patient tolerated the procedure well.           Disposition: PACU.           Condition: stable    Procedure Details:  The patient was seen in the Holding Room. The risks, benefits, complications, treatment options, and expected outcomes were discussed with the patient. The patient concurred with the proposed plan, giving informed consent.  The site of surgery properly noted/marked.   The patient was taken to the operating room and underwent general anesthesia was tolerated well was prepped and draped in the usual sterile fashion. Left AKA stump was initially isolated from the operative field. The right distal leg was isolated with an amputation bag and a Koban. The prep was performed in the thigh up to the level of the groin and sterile dressings were meticulously applied. A standard fishmouth incision was made and the dissection carried down circumferentially with a 10 blade using the electrocautery subsequently through the " subcutaneous tissue. Dissection was carried down to the level of the femoral vessels which were individually ligated with 0 silk suture and suture ligature. The dissection was carried further to the  level of the femur which was elevated with the periosteal elevator. The bone saw was used for the amputation of the femur. The amputation knife was used for the remainder of the soft tissues. The sciatic nerve was identified and retracted and ligated with 0 silk suture. Meticulous hemostasis was obtained with electrocautery and antibiotic and irrigation used. With meticulous hemostasis multiple interrupted 0 Vicryl sutures were placed to approximate the anterior and posterior fascia. Subsequently running 2-0 Vicryl suture was used for the subcutaneous tissue followed by closure with staples everting the skin under no tension. A sterile dressing was applied and right AKA stump was isolated from the field.   Then left AKA stump was brought back into the field and using #10 blade and electrocautery, circumferential incision was made approximately 5cm above the edge of the stump. Using electrocautery muscles and soft tissue was divided down to the femur. There was significant amount of necrotic/infected muscles surrounding the femur tracking up cephalad. All of this non-viable tissue was debrided and removed until healthy, red, bleeding tissue. Femur was transected approx 5cm until clean healthy tissue. Wound was irrigated with copious amounts of antibiotic irrigation. Adequate hemostasis was obtained with electrocautery and Wound vac was placed.     Patient will require left hip disarticulation.     Patient tolerated procedure well and transferred to PACU.     Dr. Moreno was scrubbed and present for entire procedure.    Jamir Braun MD  Vascular/Endovascular Surgery Fellow

## 2019-04-11 NOTE — TRANSFER OF CARE
"Anesthesia Transfer of Care Note    Patient: Pranay Colindres    Procedure(s) Performed: Procedure(s) (LRB):  AMPUTATION, ABOVE KNEE (Right)  DEBRIDEMENT, WOUND (Left)  APPLICATION, WOUND VAC (Left)    Patient location: PACU    Anesthesia Type: general    Transport from OR: Transported from OR on 6-10 L/min O2 by face mask with adequate spontaneous ventilation    Post pain: adequate analgesia    Post assessment: no apparent anesthetic complications    Post vital signs: stable    Level of consciousness: awake    Nausea/Vomiting: no nausea/vomiting    Complications: none    Transfer of care protocol was followed      Last vitals:   Visit Vitals  /62 (BP Location: Right arm, Patient Position: Lying)   Pulse 77   Temp 36.4 °C (97.6 °F) (Oral)   Resp 18   Ht 5' 8" (1.727 m)   Wt 67.1 kg (148 lb)   SpO2 100%   BMI 22.50 kg/m²     "

## 2019-04-12 ENCOUNTER — ANESTHESIA EVENT (OUTPATIENT)
Dept: SURGERY | Facility: HOSPITAL | Age: 59
DRG: 240 | End: 2019-04-12
Payer: MEDICARE

## 2019-04-12 LAB
ALBUMIN SERPL BCP-MCNC: 2 G/DL (ref 3.5–5.2)
ALP SERPL-CCNC: 98 U/L (ref 55–135)
ALT SERPL W/O P-5'-P-CCNC: 15 U/L (ref 10–44)
ANION GAP SERPL CALC-SCNC: 10 MMOL/L (ref 8–16)
AST SERPL-CCNC: 58 U/L (ref 10–40)
BASOPHILS # BLD AUTO: 0.05 K/UL (ref 0–0.2)
BASOPHILS NFR BLD: 0.4 % (ref 0–1.9)
BILIRUB SERPL-MCNC: 0.3 MG/DL (ref 0.1–1)
BUN SERPL-MCNC: 8 MG/DL (ref 6–20)
CALCIUM SERPL-MCNC: 8.3 MG/DL (ref 8.7–10.5)
CHLORIDE SERPL-SCNC: 104 MMOL/L (ref 95–110)
CO2 SERPL-SCNC: 23 MMOL/L (ref 23–29)
CREAT SERPL-MCNC: 0.6 MG/DL (ref 0.5–1.4)
DIFFERENTIAL METHOD: ABNORMAL
EOSINOPHIL # BLD AUTO: 0.2 K/UL (ref 0–0.5)
EOSINOPHIL NFR BLD: 2.1 % (ref 0–8)
ERYTHROCYTE [DISTWIDTH] IN BLOOD BY AUTOMATED COUNT: 13.8 % (ref 11.5–14.5)
EST. GFR  (AFRICAN AMERICAN): >60 ML/MIN/1.73 M^2
EST. GFR  (NON AFRICAN AMERICAN): >60 ML/MIN/1.73 M^2
GLUCOSE SERPL-MCNC: 80 MG/DL (ref 70–110)
HCT VFR BLD AUTO: 23.3 % (ref 40–54)
HGB BLD-MCNC: 8.2 G/DL (ref 14–18)
IMM GRANULOCYTES # BLD AUTO: 0.05 K/UL (ref 0–0.04)
IMM GRANULOCYTES NFR BLD AUTO: 0.4 % (ref 0–0.5)
LYMPHOCYTES # BLD AUTO: 1.1 K/UL (ref 1–4.8)
LYMPHOCYTES NFR BLD: 9.7 % (ref 18–48)
MAGNESIUM SERPL-MCNC: 1.3 MG/DL (ref 1.6–2.6)
MCH RBC QN AUTO: 27.1 PG (ref 27–31)
MCHC RBC AUTO-ENTMCNC: 35.2 G/DL (ref 32–36)
MCV RBC AUTO: 77 FL (ref 82–98)
MONOCYTES # BLD AUTO: 1 K/UL (ref 0.3–1)
MONOCYTES NFR BLD: 8.4 % (ref 4–15)
NEUTROPHILS # BLD AUTO: 9.2 K/UL (ref 1.8–7.7)
NEUTROPHILS NFR BLD: 79 % (ref 38–73)
NRBC BLD-RTO: 0 /100 WBC
PHOSPHATE SERPL-MCNC: 3.2 MG/DL (ref 2.7–4.5)
PLATELET # BLD AUTO: 424 K/UL (ref 150–350)
PMV BLD AUTO: 10.1 FL (ref 9.2–12.9)
POTASSIUM SERPL-SCNC: 3.9 MMOL/L (ref 3.5–5.1)
PROT SERPL-MCNC: 5.2 G/DL (ref 6–8.4)
RBC # BLD AUTO: 3.03 M/UL (ref 4.6–6.2)
SODIUM SERPL-SCNC: 137 MMOL/L (ref 136–145)
WBC # BLD AUTO: 11.69 K/UL (ref 3.9–12.7)

## 2019-04-12 PROCEDURE — 25000003 PHARM REV CODE 250: Performed by: STUDENT IN AN ORGANIZED HEALTH CARE EDUCATION/TRAINING PROGRAM

## 2019-04-12 PROCEDURE — S0030 INJECTION, METRONIDAZOLE: HCPCS | Performed by: STUDENT IN AN ORGANIZED HEALTH CARE EDUCATION/TRAINING PROGRAM

## 2019-04-12 PROCEDURE — 63600175 PHARM REV CODE 636 W HCPCS: Performed by: STUDENT IN AN ORGANIZED HEALTH CARE EDUCATION/TRAINING PROGRAM

## 2019-04-12 PROCEDURE — 85025 COMPLETE CBC W/AUTO DIFF WBC: CPT

## 2019-04-12 PROCEDURE — 99231 PR SUBSEQUENT HOSPITAL CARE,LEVL I: ICD-10-PCS | Mod: ,,, | Performed by: ANESTHESIOLOGY

## 2019-04-12 PROCEDURE — 99231 SBSQ HOSP IP/OBS SF/LOW 25: CPT | Mod: ,,, | Performed by: ANESTHESIOLOGY

## 2019-04-12 PROCEDURE — 36415 COLL VENOUS BLD VENIPUNCTURE: CPT

## 2019-04-12 PROCEDURE — 83735 ASSAY OF MAGNESIUM: CPT

## 2019-04-12 PROCEDURE — 80053 COMPREHEN METABOLIC PANEL: CPT

## 2019-04-12 PROCEDURE — 11000001 HC ACUTE MED/SURG PRIVATE ROOM

## 2019-04-12 PROCEDURE — 63600175 PHARM REV CODE 636 W HCPCS: Performed by: SURGERY

## 2019-04-12 PROCEDURE — 84100 ASSAY OF PHOSPHORUS: CPT

## 2019-04-12 RX ORDER — POTASSIUM CHLORIDE 20 MEQ/1
20 TABLET, EXTENDED RELEASE ORAL ONCE
Status: COMPLETED | OUTPATIENT
Start: 2019-04-12 | End: 2019-04-12

## 2019-04-12 RX ORDER — HYDROMORPHONE HYDROCHLORIDE 1 MG/ML
0.5 INJECTION, SOLUTION INTRAMUSCULAR; INTRAVENOUS; SUBCUTANEOUS ONCE
Status: COMPLETED | OUTPATIENT
Start: 2019-04-12 | End: 2019-04-12

## 2019-04-12 RX ORDER — CIPROFLOXACIN 2 MG/ML
400 INJECTION, SOLUTION INTRAVENOUS
Status: DISCONTINUED | OUTPATIENT
Start: 2019-04-12 | End: 2019-04-22

## 2019-04-12 RX ORDER — MAGNESIUM SULFATE HEPTAHYDRATE 40 MG/ML
2 INJECTION, SOLUTION INTRAVENOUS ONCE
Status: COMPLETED | OUTPATIENT
Start: 2019-04-12 | End: 2019-04-12

## 2019-04-12 RX ADMIN — ROPIVACAINE HYDROCHLORIDE 8 ML/HR: 2 INJECTION, SOLUTION EPIDURAL; INFILTRATION at 12:04

## 2019-04-12 RX ADMIN — OXYCODONE HYDROCHLORIDE 10 MG: 10 TABLET ORAL at 01:04

## 2019-04-12 RX ADMIN — METRONIDAZOLE 500 MG: 500 INJECTION, SOLUTION INTRAVENOUS at 06:04

## 2019-04-12 RX ADMIN — CEFAZOLIN 1 G: 1 INJECTION, POWDER, FOR SOLUTION INTRAMUSCULAR; INTRAVENOUS at 06:04

## 2019-04-12 RX ADMIN — CEFEPIME 2 G: 2 INJECTION, POWDER, FOR SOLUTION INTRAVENOUS at 06:04

## 2019-04-12 RX ADMIN — MAGNESIUM SULFATE 2 G: 2 INJECTION INTRAVENOUS at 09:04

## 2019-04-12 RX ADMIN — ACETAMINOPHEN 1000 MG: 500 TABLET ORAL at 06:04

## 2019-04-12 RX ADMIN — ROPIVACAINE HYDROCHLORIDE 8 ML/HR: 2 INJECTION, SOLUTION EPIDURAL; INFILTRATION at 09:04

## 2019-04-12 RX ADMIN — METOPROLOL SUCCINATE 50 MG: 50 TABLET, EXTENDED RELEASE ORAL at 09:04

## 2019-04-12 RX ADMIN — SODIUM CHLORIDE, SODIUM LACTATE, POTASSIUM CHLORIDE, AND CALCIUM CHLORIDE: .6; .31; .03; .02 INJECTION, SOLUTION INTRAVENOUS at 01:04

## 2019-04-12 RX ADMIN — METRONIDAZOLE 500 MG: 500 INJECTION, SOLUTION INTRAVENOUS at 03:04

## 2019-04-12 RX ADMIN — METHOCARBAMOL TABLETS 750 MG: 750 TABLET, COATED ORAL at 09:04

## 2019-04-12 RX ADMIN — ZOLPIDEM TARTRATE 5 MG: 5 TABLET ORAL at 09:04

## 2019-04-12 RX ADMIN — CEFAZOLIN 1 G: 1 INJECTION, POWDER, FOR SOLUTION INTRAMUSCULAR; INTRAVENOUS at 03:04

## 2019-04-12 RX ADMIN — POTASSIUM CHLORIDE 20 MEQ: 1500 TABLET, EXTENDED RELEASE ORAL at 09:04

## 2019-04-12 RX ADMIN — CEFAZOLIN 1 G: 1 INJECTION, POWDER, FOR SOLUTION INTRAMUSCULAR; INTRAVENOUS at 11:04

## 2019-04-12 RX ADMIN — HYDROMORPHONE HYDROCHLORIDE 0.5 MG: 1 INJECTION, SOLUTION INTRAMUSCULAR; INTRAVENOUS; SUBCUTANEOUS at 01:04

## 2019-04-12 RX ADMIN — CEFEPIME 2 G: 2 INJECTION, POWDER, FOR SOLUTION INTRAVENOUS at 03:04

## 2019-04-12 RX ADMIN — PREGABALIN 150 MG: 50 CAPSULE ORAL at 09:04

## 2019-04-12 RX ADMIN — METRONIDAZOLE 500 MG: 500 INJECTION, SOLUTION INTRAVENOUS at 11:04

## 2019-04-12 RX ADMIN — METHOCARBAMOL TABLETS 750 MG: 750 TABLET, COATED ORAL at 03:04

## 2019-04-12 RX ADMIN — ACETAMINOPHEN 1000 MG: 500 TABLET ORAL at 01:04

## 2019-04-12 RX ADMIN — OXYCODONE HYDROCHLORIDE 10 MG: 10 TABLET ORAL at 09:04

## 2019-04-12 RX ADMIN — CIPROFLOXACIN 400 MG: 2 INJECTION, SOLUTION INTRAVENOUS at 05:04

## 2019-04-12 RX ADMIN — ACETAMINOPHEN 1000 MG: 500 TABLET ORAL at 11:04

## 2019-04-12 RX ADMIN — CELECOXIB 200 MG: 200 CAPSULE ORAL at 09:04

## 2019-04-12 NOTE — ANESTHESIA POST-OP PAIN MANAGEMENT
Acute Pain Service Progress Note    Pranay Colindres is a 59 y.o., male, [id number].    Surgery:  Amputation, Above Knee  Debridement, Wound left  Debridement, wound VAC    Post Op Day #: 1    Catheter type: perineural  femoral    Infusion type: Ropivacaine 0.2%  8 basal    Problem List:    Active Hospital Problems    Diagnosis  POA    *Critical lower limb ischemia [I99.8]  Yes      Resolved Hospital Problems   No resolved problems to display.       Subjective:     General appearance of alert, oriented, no complaints   Pain with rest: 2    Numbers   Pain with movement: 6    Numbers   Side Effects    1. Pruritis No    2. Nausea No    3. Motor Blockade No, 0=Ability to raise lower extremities off bed    4. Sedation No, 1=awake and alert    Objective:       Catheter site clean, dry, intact        Vitals   Vitals:    04/12/19 0747   BP: 135/81   Pulse: 99   Resp: 14   Temp: 35.9 °C (96.6 °F)        Labs    Admission on 04/10/2019   Component Date Value Ref Range Status    aPTT 04/10/2019 24.2  21.0 - 32.0 sec Final    WBC 04/10/2019 9.58  3.90 - 12.70 K/uL Final    RBC 04/10/2019 4.20* 4.60 - 6.20 M/uL Final    Hemoglobin 04/10/2019 11.5* 14.0 - 18.0 g/dL Final    Hematocrit 04/10/2019 32.9* 40.0 - 54.0 % Final    MCV 04/10/2019 78* 82 - 98 fL Final    MCH 04/10/2019 27.4  27.0 - 31.0 pg Final    MCHC 04/10/2019 35.0  32.0 - 36.0 g/dL Final    RDW 04/10/2019 13.7  11.5 - 14.5 % Final    Platelets 04/10/2019 571* 150 - 350 K/uL Final    MPV 04/10/2019 10.3  9.2 - 12.9 fL Final    Immature Granulocytes 04/10/2019 0.3  0.0 - 0.5 % Final    Gran # (ANC) 04/10/2019 7.3  1.8 - 7.7 K/uL Final    Immature Grans (Abs) 04/10/2019 0.03  0.00 - 0.04 K/uL Final    Lymph # 04/10/2019 1.5  1.0 - 4.8 K/uL Final    Mono # 04/10/2019 0.4  0.3 - 1.0 K/uL Final    Eos # 04/10/2019 0.3  0.0 - 0.5 K/uL Final    Baso # 04/10/2019 0.06  0.00 - 0.20 K/uL Final    nRBC 04/10/2019 0  0 /100 WBC Final    Gran% 04/10/2019 75.7*  38.0 - 73.0 % Final    Lymph% 04/10/2019 16.0* 18.0 - 48.0 % Final    Mono% 04/10/2019 4.6  4.0 - 15.0 % Final    Eosinophil% 04/10/2019 2.8  0.0 - 8.0 % Final    Basophil% 04/10/2019 0.6  0.0 - 1.9 % Final    Differential Method 04/10/2019 Automated   Final    Sodium 04/10/2019 136  136 - 145 mmol/L Final    Potassium 04/10/2019 3.7  3.5 - 5.1 mmol/L Final    Chloride 04/10/2019 98  95 - 110 mmol/L Final    CO2 04/10/2019 28  23 - 29 mmol/L Final    Glucose 04/10/2019 133* 70 - 110 mg/dL Final    BUN, Bld 04/10/2019 13  6 - 20 mg/dL Final    Creatinine 04/10/2019 0.7  0.5 - 1.4 mg/dL Final    Calcium 04/10/2019 9.9  8.7 - 10.5 mg/dL Final    Total Protein 04/10/2019 7.4  6.0 - 8.4 g/dL Final    Albumin 04/10/2019 2.8* 3.5 - 5.2 g/dL Final    Total Bilirubin 04/10/2019 0.4  0.1 - 1.0 mg/dL Final    Alkaline Phosphatase 04/10/2019 162* 55 - 135 U/L Final    AST 04/10/2019 54* 10 - 40 U/L Final    ALT 04/10/2019 34  10 - 44 U/L Final    Anion Gap 04/10/2019 10  8 - 16 mmol/L Final    eGFR if African American 04/10/2019 >60.0  >60 mL/min/1.73 m^2 Final    eGFR if non African American 04/10/2019 >60.0  >60 mL/min/1.73 m^2 Final    Magnesium 04/10/2019 1.7  1.6 - 2.6 mg/dL Final    Phosphorus 04/10/2019 3.4  2.7 - 4.5 mg/dL Final    Prothrombin Time 04/10/2019 10.1  9.0 - 12.5 sec Final    INR 04/10/2019 1.0  0.8 - 1.2 Final    WBC 04/11/2019 9.70  3.90 - 12.70 K/uL Final    RBC 04/11/2019 3.95* 4.60 - 6.20 M/uL Final    Hemoglobin 04/11/2019 10.7* 14.0 - 18.0 g/dL Final    Hematocrit 04/11/2019 31.1* 40.0 - 54.0 % Final    MCV 04/11/2019 79* 82 - 98 fL Final    MCH 04/11/2019 27.1  27.0 - 31.0 pg Final    MCHC 04/11/2019 34.4  32.0 - 36.0 g/dL Final    RDW 04/11/2019 13.7  11.5 - 14.5 % Final    Platelets 04/11/2019 488* 150 - 350 K/uL Final    MPV 04/11/2019 10.6  9.2 - 12.9 fL Final    Immature Granulocytes 04/11/2019 0.3  0.0 - 0.5 % Final    Gran # (ANC) 04/11/2019 6.4  1.8 -  7.7 K/uL Final    Immature Grans (Abs) 04/11/2019 0.03  0.00 - 0.04 K/uL Final    Lymph # 04/11/2019 1.7  1.0 - 4.8 K/uL Final    Mono # 04/11/2019 1.1* 0.3 - 1.0 K/uL Final    Eos # 04/11/2019 0.5  0.0 - 0.5 K/uL Final    Baso # 04/11/2019 0.06  0.00 - 0.20 K/uL Final    nRBC 04/11/2019 0  0 /100 WBC Final    Gran% 04/11/2019 65.7  38.0 - 73.0 % Final    Lymph% 04/11/2019 17.6* 18.0 - 48.0 % Final    Mono% 04/11/2019 11.0  4.0 - 15.0 % Final    Eosinophil% 04/11/2019 4.8  0.0 - 8.0 % Final    Basophil% 04/11/2019 0.6  0.0 - 1.9 % Final    Differential Method 04/11/2019 Automated   Final    Sodium 04/11/2019 136  136 - 145 mmol/L Final    Potassium 04/11/2019 4.0  3.5 - 5.1 mmol/L Final    Chloride 04/11/2019 99  95 - 110 mmol/L Final    CO2 04/11/2019 29  23 - 29 mmol/L Final    Glucose 04/11/2019 92  70 - 110 mg/dL Final    BUN, Bld 04/11/2019 10  6 - 20 mg/dL Final    Creatinine 04/11/2019 0.6  0.5 - 1.4 mg/dL Final    Calcium 04/11/2019 9.5  8.7 - 10.5 mg/dL Final    Total Protein 04/11/2019 7.1  6.0 - 8.4 g/dL Final    Albumin 04/11/2019 2.6* 3.5 - 5.2 g/dL Final    Total Bilirubin 04/11/2019 0.5  0.1 - 1.0 mg/dL Final    Alkaline Phosphatase 04/11/2019 147* 55 - 135 U/L Final    AST 04/11/2019 47* 10 - 40 U/L Final    ALT 04/11/2019 26  10 - 44 U/L Final    Anion Gap 04/11/2019 8  8 - 16 mmol/L Final    eGFR if African American 04/11/2019 >60.0  >60 mL/min/1.73 m^2 Final    eGFR if non African American 04/11/2019 >60.0  >60 mL/min/1.73 m^2 Final    Magnesium 04/11/2019 1.4* 1.6 - 2.6 mg/dL Final    Phosphorus 04/11/2019 3.8  2.7 - 4.5 mg/dL Final    Anaerobic Culture 04/11/2019 Culture in progress   Preliminary    Gram Stain Result 04/11/2019 Few WBC's   Final    Gram Stain Result 04/11/2019 No organisms seen   Final    Gram Stain Result 04/11/2019 Few WBC's   Final    Gram Stain Result 04/11/2019 No organisms seen   Final    Anaerobic Culture 04/11/2019 Culture in progress    Preliminary    WBC 04/12/2019 11.69  3.90 - 12.70 K/uL Final    RBC 04/12/2019 3.03* 4.60 - 6.20 M/uL Final    Hemoglobin 04/12/2019 8.2* 14.0 - 18.0 g/dL Final    Hematocrit 04/12/2019 23.3* 40.0 - 54.0 % Final    MCV 04/12/2019 77* 82 - 98 fL Final    MCH 04/12/2019 27.1  27.0 - 31.0 pg Final    MCHC 04/12/2019 35.2  32.0 - 36.0 g/dL Final    RDW 04/12/2019 13.8  11.5 - 14.5 % Final    Platelets 04/12/2019 424* 150 - 350 K/uL Final    MPV 04/12/2019 10.1  9.2 - 12.9 fL Final    Immature Granulocytes 04/12/2019 0.4  0.0 - 0.5 % Final    Gran # (ANC) 04/12/2019 9.2* 1.8 - 7.7 K/uL Final    Immature Grans (Abs) 04/12/2019 0.05* 0.00 - 0.04 K/uL Final    Lymph # 04/12/2019 1.1  1.0 - 4.8 K/uL Final    Mono # 04/12/2019 1.0  0.3 - 1.0 K/uL Final    Eos # 04/12/2019 0.2  0.0 - 0.5 K/uL Final    Baso # 04/12/2019 0.05  0.00 - 0.20 K/uL Final    nRBC 04/12/2019 0  0 /100 WBC Final    Gran% 04/12/2019 79.0* 38.0 - 73.0 % Final    Lymph% 04/12/2019 9.7* 18.0 - 48.0 % Final    Mono% 04/12/2019 8.4  4.0 - 15.0 % Final    Eosinophil% 04/12/2019 2.1  0.0 - 8.0 % Final    Basophil% 04/12/2019 0.4  0.0 - 1.9 % Final    Differential Method 04/12/2019 Automated   Final    Sodium 04/12/2019 137  136 - 145 mmol/L Final    Potassium 04/12/2019 3.9  3.5 - 5.1 mmol/L Final    Chloride 04/12/2019 104  95 - 110 mmol/L Final    CO2 04/12/2019 23  23 - 29 mmol/L Final    Glucose 04/12/2019 80  70 - 110 mg/dL Final    BUN, Bld 04/12/2019 8  6 - 20 mg/dL Final    Creatinine 04/12/2019 0.6  0.5 - 1.4 mg/dL Final    Calcium 04/12/2019 8.3* 8.7 - 10.5 mg/dL Final    Total Protein 04/12/2019 5.2* 6.0 - 8.4 g/dL Final    Albumin 04/12/2019 2.0* 3.5 - 5.2 g/dL Final    Total Bilirubin 04/12/2019 0.3  0.1 - 1.0 mg/dL Final    Alkaline Phosphatase 04/12/2019 98  55 - 135 U/L Final    AST 04/12/2019 58* 10 - 40 U/L Final    ALT 04/12/2019 15  10 - 44 U/L Final    Anion Gap 04/12/2019 10  8 - 16 mmol/L Final     eGFR if African American 04/12/2019 >60.0  >60 mL/min/1.73 m^2 Final    eGFR if non African American 04/12/2019 >60.0  >60 mL/min/1.73 m^2 Final    Magnesium 04/12/2019 1.3* 1.6 - 2.6 mg/dL Final    Phosphorus 04/12/2019 3.2  2.7 - 4.5 mg/dL Final        Meds   Current Facility-Administered Medications   Medication Dose Route Frequency Provider Last Rate Last Dose    acetaminophen tablet 1,000 mg  1,000 mg Oral Q6H Donald Pratt MD   1,000 mg at 04/12/19 0651    ceFAZolin injection 1 g  1 g Intravenous Q8H Denisha Chambers MD   1 g at 04/12/19 0652    ceFEPIme injection 2 g  2 g Intravenous Q8H Jamir Braun MD   2 g at 04/12/19 0652    celecoxib capsule 200 mg  200 mg Oral Daily Ignacio Nguyen MD        lactated ringers infusion   Intravenous Continuous Denisha Chambers  mL/hr at 04/12/19 0134      methocarbamol tablet 750 mg  750 mg Oral TID Ignacio Nguyen MD   750 mg at 04/11/19 2054    metoprolol succinate (TOPROL-XL) 24 hr tablet 50 mg  50 mg Oral Daily Camacho Pete MD   50 mg at 04/11/19 0826    metronidazole IVPB 500 mg  500 mg Intravenous Q8H Denisha Chambers  mL/hr at 04/12/19 0651 500 mg at 04/12/19 0651    ondansetron injection 4 mg  4 mg Intravenous Q6H PRN Denisha Chambers MD        ondansetron injection 4 mg  4 mg Intravenous Q12H PRN Ignacio Nguyen MD        oxyCODONE immediate release tablet 5 mg  5 mg Oral Q4H PRN Denisha Chambers MD        oxyCODONE immediate release tablet Tab 10 mg  10 mg Oral Q4H PRN Denisha Chambers MD   10 mg at 04/11/19 1852    pregabalin capsule 150 mg  150 mg Oral QHS Ignacio Nguyen MD   150 mg at 04/11/19 2054    promethazine (PHENERGAN) 6.25 mg in dextrose 5 % 50 mL IVPB  6.25 mg Intravenous Q6H PRN Denisha Chambers MD        ropivacaine (PF) 2 mg/ml (0.2%) infusion  8 mL/hr Perineural Continuous Ignacio Nguyen MD 8 mL/hr at 04/12/19 0009 8 mL/hr at 04/12/19 0009     sodium chloride 0.9% flush 10 mL  10 mL Intravenous PRN James Montero MD        sodium chloride 0.9% flush 3 mL  3 mL Intravenous PRN Denisha Chambers MD        vancomycin in dextrose 5 % 1 gram/250 mL IVPB 1,000 mg  1,000 mg Intravenous On Call Procedure Denisha Chambers MD   1,000 mg at 04/11/19 1042    zolpidem tablet 5 mg  5 mg Oral Nightly PRN Isaías Ronquillo MD   5 mg at 04/11/19 2102         Assessment:     Pain control adequate    Plan:     Patient doing well, continue present treatment.    Evaluator Oliverio Wilson

## 2019-04-12 NOTE — ASSESSMENT & PLAN NOTE
59 y.o. male with h/o HTN, arthritis, and cerebral palsy, tobacco abuse, PVD with previous aorto-bifem and L fem/pop. S/p emergent L AKA 3/20/19. Presents with fat necrosis L AKA stump and ischemic R foot rest pain. 1 Day Post-Op from L AKA washout/debridement with wound vac placement and Right AKA on 4/11/19. Left AKA with tracking muscle necrosis and purulence.    - Cardiac diet  - mIVF to 100cc/hr  - Ropivicaine pain catheter in place, appreciate anesthesia assistance  - Bedside wound vac change today (04/13/19)  - Broad spectrum abx: cipro, flagyl, ancef  - pain control prn  - PT/OT  - Pulmonary toilet    Dispo: Wound vac changed today due to increase in WBC o/n. Tissue without evidence of infection/necrosis. Plan to return to OR for left hip disarticulation on Tuesday 4/16

## 2019-04-12 NOTE — ADDENDUM NOTE
Addendum  created 04/12/19 1058 by Ignacio Nguyen MD    Diagnosis association updated, Intraprocedure Blocks edited, Pend clinical note, Sign clinical note

## 2019-04-12 NOTE — ASSESSMENT & PLAN NOTE
59 y.o. male with h/o HTN, arthritis, and cerebral palsy, tobacco abuse, PVD with previous aorto-bifem and L fem/pop. S/p emergent L AKA 3/20/19. Presents with fat necrosis L AKA stump and ischemic R foot rest pain. 1 Day Post-Op from L AKA washout/debridement with wound vac placement and Right AKA on 4/11/19. Left AKA with tracking muscle necrosis and purulence.    - Cardiac diet  - decrease mIVF to 50cc/hr  - Ropivicaine pain catheter in place, appreciate anesthesia assistance  - Bedside wound vac change today  - Broad spectrum abx: ancef, flagyl  - pain control prn    Dispo: plan to return to OR for left hip disarticulation on Tuesday 4/16

## 2019-04-12 NOTE — PLAN OF CARE
POC reviewed with pt and he verbalized understanding. pt dressings CDI, woundvac in place and funtional. pt AAOx4, VSS, no acute distress noted. instructed to call for assistance if needed, call light in reach. will continue to monitor.

## 2019-04-12 NOTE — PROGRESS NOTES
[facility]  Vascular Surgery  Progress Note    Patient Name: Pranay Colindres  MRN: [id number]  Admission Date: 4/10/2019  Primary Care Provider: Jenaro Torres MD    Subjective:     Interval History: No acute events overnight.  Pain well controlled on ropivicaine catheter. Tolerating diet. AF, VSS. Wound vac in place with SS output.     Post-Op Info:  Procedure(s) (LRB):  AMPUTATION, ABOVE KNEE (Right)  DEBRIDEMENT, WOUND (Left)  APPLICATION, WOUND VAC (Left)   1 Day Post-Op       Medications:  Continuous Infusions:   lactated ringers 100 mL/hr at 04/12/19 0134    ropivacaine (PF) 2 mg/ml (0.2%) 8 mL/hr (04/12/19 0009)     Scheduled Meds:   acetaminophen  1,000 mg Oral Q6H    ceFAZolin (ANCEF) IVPB  1 g Intravenous Q8H    ceFEPime (MAXIPIME) IVPB  2 g Intravenous Q8H    celecoxib  200 mg Oral Daily    methocarbamol  750 mg Oral TID    metoprolol succinate  50 mg Oral Daily    metronidazole  500 mg Intravenous Q8H    pregabalin  150 mg Oral QHS     PRN Meds:ondansetron, ondansetron, oxyCODONE, oxyCODONE, promethazine (PHENERGAN) IVPB, sodium chloride 0.9%, sodium chloride 0.9%, vancomycin (VANCOCIN) IVPB, zolpidem     Objective:     Vital Signs (Most Recent):  Temp: 97 °F (36.1 °C) (04/12/19 0400)  Pulse: 90 (04/12/19 0400)  Resp: 18 (04/12/19 0400)  BP: 133/83 (04/12/19 0400)  SpO2: 99 % (04/12/19 0400) Vital Signs (24h Range):  Temp:  [97 °F (36.1 °C)-97.9 °F (36.6 °C)] 97 °F (36.1 °C)  Pulse:  [] 90  Resp:  [10-19] 18  SpO2:  [98 %-100 %] 99 %  BP: (119-164)/(62-89) 133/83     Date 04/12/19 0700 - 04/13/19 0659   Shift 4149-9047 9287-2452 6002-8830 24 Hour Total   INTAKE   Shift Total(mL/kg)       OUTPUT   Urine(mL/kg/hr) 225   225   Shift Total(mL/kg) 225(3.4)   225(3.4)   Weight (kg) 67.1 67.1 67.1 67.1       Physical Exam   Constitutional: He is oriented to person, place, and time. He appears well-developed and well-nourished.   Neck: No JVD present. No tracheal deviation  present.   Cardiovascular: Normal rate and regular rhythm.   Pulmonary/Chest: Effort normal. No respiratory distress.   Abdominal: Soft. He exhibits no distension. There is no tenderness. There is no guarding.   Musculoskeletal: He exhibits no edema.   L AKA with wound vac in place, good seal  R AKA with kerlix and light compression with ACE, dressing not saturated   Neurological: He is alert and oriented to person, place, and time.   Skin: Skin is warm and dry.   Nursing note and vitals reviewed.      Significant Labs:  CBC:   Recent Labs   Lab 04/12/19  0605   WBC 11.69   RBC 3.03*   HGB 8.2*   HCT 23.3*   *   MCV 77*   MCH 27.1   MCHC 35.2     CMP:   Recent Labs   Lab 04/12/19  0605   GLU 80   CALCIUM 8.3*   ALBUMIN 2.0*   PROT 5.2*      K 3.9   CO2 23      BUN 8   CREATININE 0.6   ALKPHOS 98   ALT 15   AST 58*   BILITOT 0.3       Significant Diagnostics:  I have reviewed all pertinent imaging results/findings within the past 24 hours.    Assessment/Plan:     * Critical lower limb ischemia  59 y.o. male with h/o HTN, arthritis, and cerebral palsy, tobacco abuse, PVD with previous aorto-bifem and L fem/pop. S/p emergent L AKA 3/20/19. Presents with fat necrosis L AKA stump and ischemic R foot rest pain. 1 Day Post-Op from L AKA washout/debridement with wound vac placement and Right AKA on 4/11/19. Left AKA with tracking muscle necrosis and purulence.    - Cardiac diet  - decrease mIVF to 50cc/hr  - Ropivicaine pain catheter in place, appreciate anesthesia assistance  - Bedside wound vac change today  - Broad spectrum abx: ancef, flagyl  - pain control prn    Dispo: plan to return to OR for left hip disarticulation on Tuesday 4/16        Denisha Chambers MD  Vascular Surgery  [facility]

## 2019-04-12 NOTE — SUBJECTIVE & OBJECTIVE
Medications:  Continuous Infusions:   lactated ringers 100 mL/hr at 04/12/19 0134    ropivacaine (PF) 2 mg/ml (0.2%) 8 mL/hr (04/12/19 0009)     Scheduled Meds:   acetaminophen  1,000 mg Oral Q6H    ceFAZolin (ANCEF) IVPB  1 g Intravenous Q8H    ceFEPime (MAXIPIME) IVPB  2 g Intravenous Q8H    celecoxib  200 mg Oral Daily    methocarbamol  750 mg Oral TID    metoprolol succinate  50 mg Oral Daily    metronidazole  500 mg Intravenous Q8H    pregabalin  150 mg Oral QHS     PRN Meds:ondansetron, ondansetron, oxyCODONE, oxyCODONE, promethazine (PHENERGAN) IVPB, sodium chloride 0.9%, sodium chloride 0.9%, vancomycin (VANCOCIN) IVPB, zolpidem     Objective:     Vital Signs (Most Recent):  Temp: 97 °F (36.1 °C) (04/12/19 0400)  Pulse: 90 (04/12/19 0400)  Resp: 18 (04/12/19 0400)  BP: 133/83 (04/12/19 0400)  SpO2: 99 % (04/12/19 0400) Vital Signs (24h Range):  Temp:  [97 °F (36.1 °C)-97.9 °F (36.6 °C)] 97 °F (36.1 °C)  Pulse:  [] 90  Resp:  [10-19] 18  SpO2:  [98 %-100 %] 99 %  BP: (119-164)/(62-89) 133/83     Date 04/12/19 0700 - 04/13/19 0659   Shift 9931-0187 2740-1433 2702-5589 24 Hour Total   INTAKE   Shift Total(mL/kg)       OUTPUT   Urine(mL/kg/hr) 225   225   Shift Total(mL/kg) 225(3.4)   225(3.4)   Weight (kg) 67.1 67.1 67.1 67.1       Physical Exam   Constitutional: He is oriented to person, place, and time. He appears well-developed and well-nourished.   Neck: No JVD present. No tracheal deviation present.   Cardiovascular: Normal rate and regular rhythm.   Pulmonary/Chest: Effort normal. No respiratory distress.   Abdominal: Soft. He exhibits no distension. There is no tenderness. There is no guarding.   Musculoskeletal: He exhibits no edema.   L AKA with wound vac in place, good seal  R AKA with kerlix and light compression with ACE, dressing not saturated   Neurological: He is alert and oriented to person, place, and time.   Skin: Skin is warm and dry.   Nursing note and vitals  reviewed.      Significant Labs:  CBC:   Recent Labs   Lab 04/12/19  0605   WBC 11.69   RBC 3.03*   HGB 8.2*   HCT 23.3*   *   MCV 77*   MCH 27.1   MCHC 35.2     CMP:   Recent Labs   Lab 04/12/19  0605   GLU 80   CALCIUM 8.3*   ALBUMIN 2.0*   PROT 5.2*      K 3.9   CO2 23      BUN 8   CREATININE 0.6   ALKPHOS 98   ALT 15   AST 58*   BILITOT 0.3       Significant Diagnostics:  I have reviewed all pertinent imaging results/findings within the past 24 hours.

## 2019-04-13 LAB
ALBUMIN SERPL BCP-MCNC: 2 G/DL (ref 3.5–5.2)
ALP SERPL-CCNC: 100 U/L (ref 55–135)
ALT SERPL W/O P-5'-P-CCNC: 17 U/L (ref 10–44)
ANION GAP SERPL CALC-SCNC: 7 MMOL/L (ref 8–16)
AST SERPL-CCNC: 93 U/L (ref 10–40)
BACTERIA SPEC AEROBE CULT: NORMAL
BACTERIA SPEC AEROBE CULT: NORMAL
BASOPHILS # BLD AUTO: 0.05 K/UL (ref 0–0.2)
BASOPHILS NFR BLD: 0.4 % (ref 0–1.9)
BILIRUB SERPL-MCNC: 0.2 MG/DL (ref 0.1–1)
BUN SERPL-MCNC: 8 MG/DL (ref 6–20)
CALCIUM SERPL-MCNC: 8.8 MG/DL (ref 8.7–10.5)
CHLORIDE SERPL-SCNC: 101 MMOL/L (ref 95–110)
CO2 SERPL-SCNC: 26 MMOL/L (ref 23–29)
CREAT SERPL-MCNC: 0.6 MG/DL (ref 0.5–1.4)
DIFFERENTIAL METHOD: ABNORMAL
EOSINOPHIL # BLD AUTO: 0.4 K/UL (ref 0–0.5)
EOSINOPHIL NFR BLD: 2.8 % (ref 0–8)
ERYTHROCYTE [DISTWIDTH] IN BLOOD BY AUTOMATED COUNT: 13.6 % (ref 11.5–14.5)
EST. GFR  (AFRICAN AMERICAN): >60 ML/MIN/1.73 M^2
EST. GFR  (NON AFRICAN AMERICAN): >60 ML/MIN/1.73 M^2
GLUCOSE SERPL-MCNC: 137 MG/DL (ref 70–110)
HCT VFR BLD AUTO: 26.1 % (ref 40–54)
HGB BLD-MCNC: 9.3 G/DL (ref 14–18)
IMM GRANULOCYTES # BLD AUTO: 0.08 K/UL (ref 0–0.04)
IMM GRANULOCYTES NFR BLD AUTO: 0.6 % (ref 0–0.5)
LYMPHOCYTES # BLD AUTO: 1.4 K/UL (ref 1–4.8)
LYMPHOCYTES NFR BLD: 10.9 % (ref 18–48)
MAGNESIUM SERPL-MCNC: 1.5 MG/DL (ref 1.6–2.6)
MCH RBC QN AUTO: 27.7 PG (ref 27–31)
MCHC RBC AUTO-ENTMCNC: 35.6 G/DL (ref 32–36)
MCV RBC AUTO: 78 FL (ref 82–98)
MONOCYTES # BLD AUTO: 0.8 K/UL (ref 0.3–1)
MONOCYTES NFR BLD: 6.1 % (ref 4–15)
NEUTROPHILS # BLD AUTO: 10.4 K/UL (ref 1.8–7.7)
NEUTROPHILS NFR BLD: 79.2 % (ref 38–73)
NRBC BLD-RTO: 0 /100 WBC
PHOSPHATE SERPL-MCNC: 3 MG/DL (ref 2.7–4.5)
PLATELET # BLD AUTO: 480 K/UL (ref 150–350)
PMV BLD AUTO: 10 FL (ref 9.2–12.9)
POTASSIUM SERPL-SCNC: 3.8 MMOL/L (ref 3.5–5.1)
PROT SERPL-MCNC: 5.8 G/DL (ref 6–8.4)
RBC # BLD AUTO: 3.36 M/UL (ref 4.6–6.2)
SODIUM SERPL-SCNC: 134 MMOL/L (ref 136–145)
WBC # BLD AUTO: 13.09 K/UL (ref 3.9–12.7)

## 2019-04-13 PROCEDURE — 63600175 PHARM REV CODE 636 W HCPCS: Performed by: STUDENT IN AN ORGANIZED HEALTH CARE EDUCATION/TRAINING PROGRAM

## 2019-04-13 PROCEDURE — S0030 INJECTION, METRONIDAZOLE: HCPCS | Performed by: STUDENT IN AN ORGANIZED HEALTH CARE EDUCATION/TRAINING PROGRAM

## 2019-04-13 PROCEDURE — 99231 PR SUBSEQUENT HOSPITAL CARE,LEVL I: ICD-10-PCS | Mod: ,,, | Performed by: ANESTHESIOLOGY

## 2019-04-13 PROCEDURE — 25000003 PHARM REV CODE 250: Performed by: SURGERY

## 2019-04-13 PROCEDURE — 80053 COMPREHEN METABOLIC PANEL: CPT

## 2019-04-13 PROCEDURE — 99231 SBSQ HOSP IP/OBS SF/LOW 25: CPT | Mod: ,,, | Performed by: ANESTHESIOLOGY

## 2019-04-13 PROCEDURE — 63600175 PHARM REV CODE 636 W HCPCS: Performed by: SURGERY

## 2019-04-13 PROCEDURE — 63600175 PHARM REV CODE 636 W HCPCS

## 2019-04-13 PROCEDURE — 25000003 PHARM REV CODE 250: Performed by: STUDENT IN AN ORGANIZED HEALTH CARE EDUCATION/TRAINING PROGRAM

## 2019-04-13 PROCEDURE — 36415 COLL VENOUS BLD VENIPUNCTURE: CPT

## 2019-04-13 PROCEDURE — 84100 ASSAY OF PHOSPHORUS: CPT

## 2019-04-13 PROCEDURE — 11000001 HC ACUTE MED/SURG PRIVATE ROOM

## 2019-04-13 PROCEDURE — 85025 COMPLETE CBC W/AUTO DIFF WBC: CPT

## 2019-04-13 PROCEDURE — 83735 ASSAY OF MAGNESIUM: CPT

## 2019-04-13 RX ORDER — HYDROMORPHONE HYDROCHLORIDE 2 MG/ML
INJECTION, SOLUTION INTRAMUSCULAR; INTRAVENOUS; SUBCUTANEOUS
Status: COMPLETED
Start: 2019-04-13 | End: 2019-04-13

## 2019-04-13 RX ORDER — LANOLIN ALCOHOL/MO/W.PET/CERES
800 CREAM (GRAM) TOPICAL DAILY
Status: DISCONTINUED | OUTPATIENT
Start: 2019-04-13 | End: 2019-04-13

## 2019-04-13 RX ORDER — LANOLIN ALCOHOL/MO/W.PET/CERES
800 CREAM (GRAM) TOPICAL 2 TIMES DAILY
Status: COMPLETED | OUTPATIENT
Start: 2019-04-13 | End: 2019-04-13

## 2019-04-13 RX ORDER — HYDROMORPHONE HYDROCHLORIDE 1 MG/ML
0.5 INJECTION, SOLUTION INTRAMUSCULAR; INTRAVENOUS; SUBCUTANEOUS ONCE
Status: COMPLETED | OUTPATIENT
Start: 2019-04-13 | End: 2019-04-13

## 2019-04-13 RX ORDER — HYDROMORPHONE HYDROCHLORIDE 1 MG/ML
0.5 INJECTION, SOLUTION INTRAMUSCULAR; INTRAVENOUS; SUBCUTANEOUS ONCE
Status: DISCONTINUED | OUTPATIENT
Start: 2019-04-13 | End: 2019-04-15

## 2019-04-13 RX ADMIN — METRONIDAZOLE 500 MG: 500 INJECTION, SOLUTION INTRAVENOUS at 03:04

## 2019-04-13 RX ADMIN — CEFAZOLIN 1 G: 1 INJECTION, POWDER, FOR SOLUTION INTRAMUSCULAR; INTRAVENOUS at 02:04

## 2019-04-13 RX ADMIN — METRONIDAZOLE 500 MG: 500 INJECTION, SOLUTION INTRAVENOUS at 10:04

## 2019-04-13 RX ADMIN — MAGNESIUM OXIDE TAB 400 MG (241.3 MG ELEMENTAL MG) 800 MG: 400 (241.3 MG) TAB at 08:04

## 2019-04-13 RX ADMIN — OXYCODONE HYDROCHLORIDE 5 MG: 5 TABLET ORAL at 06:04

## 2019-04-13 RX ADMIN — ROPIVACAINE HYDROCHLORIDE 8 ML/HR: 2 INJECTION, SOLUTION EPIDURAL; INFILTRATION at 03:04

## 2019-04-13 RX ADMIN — SODIUM CHLORIDE, SODIUM LACTATE, POTASSIUM CHLORIDE, AND CALCIUM CHLORIDE: .6; .31; .03; .02 INJECTION, SOLUTION INTRAVENOUS at 11:04

## 2019-04-13 RX ADMIN — ZOLPIDEM TARTRATE 5 MG: 5 TABLET ORAL at 11:04

## 2019-04-13 RX ADMIN — OXYCODONE HYDROCHLORIDE 10 MG: 10 TABLET ORAL at 10:04

## 2019-04-13 RX ADMIN — METRONIDAZOLE 500 MG: 500 INJECTION, SOLUTION INTRAVENOUS at 06:04

## 2019-04-13 RX ADMIN — ACETAMINOPHEN 1000 MG: 500 TABLET ORAL at 11:04

## 2019-04-13 RX ADMIN — HYDROMORPHONE HYDROCHLORIDE 2 MG: 2 INJECTION INTRAMUSCULAR; INTRAVENOUS; SUBCUTANEOUS at 09:04

## 2019-04-13 RX ADMIN — MAGNESIUM OXIDE TAB 400 MG (241.3 MG ELEMENTAL MG) 800 MG: 400 (241.3 MG) TAB at 11:04

## 2019-04-13 RX ADMIN — CEFAZOLIN 1 G: 1 INJECTION, POWDER, FOR SOLUTION INTRAMUSCULAR; INTRAVENOUS at 10:04

## 2019-04-13 RX ADMIN — ACETAMINOPHEN 1000 MG: 500 TABLET ORAL at 06:04

## 2019-04-13 RX ADMIN — PREGABALIN 150 MG: 50 CAPSULE ORAL at 08:04

## 2019-04-13 RX ADMIN — CIPROFLOXACIN 400 MG: 2 INJECTION, SOLUTION INTRAVENOUS at 06:04

## 2019-04-13 RX ADMIN — HYDROMORPHONE HYDROCHLORIDE 0.5 MG: 1 INJECTION, SOLUTION INTRAMUSCULAR; INTRAVENOUS; SUBCUTANEOUS at 08:04

## 2019-04-13 RX ADMIN — CELECOXIB 200 MG: 200 CAPSULE ORAL at 08:04

## 2019-04-13 RX ADMIN — METOPROLOL SUCCINATE 50 MG: 50 TABLET, EXTENDED RELEASE ORAL at 08:04

## 2019-04-13 RX ADMIN — CEFAZOLIN 1 G: 1 INJECTION, POWDER, FOR SOLUTION INTRAMUSCULAR; INTRAVENOUS at 06:04

## 2019-04-13 RX ADMIN — CIPROFLOXACIN 400 MG: 2 INJECTION, SOLUTION INTRAVENOUS at 04:04

## 2019-04-13 NOTE — SUBJECTIVE & OBJECTIVE
Interval Hx: NAEON. AFVSS. Pain controlled. Wound vac in place with good suction. WBC increased o/n to 13 from 11.6. Now on cipro, prior cefepime.       Medications:  Continuous Infusions:   lactated ringers 100 mL/hr at 04/13/19 0818    ropivacaine (PF) 2 mg/ml (0.2%) 8 mL/hr (04/12/19 2146)     Scheduled Meds:   acetaminophen  1,000 mg Oral Q6H    ceFAZolin (ANCEF) IVPB  1 g Intravenous Q8H    celecoxib  200 mg Oral Daily    ciprofloxacin  400 mg Intravenous Q12H    HYDROmorphone  0.5 mg Intravenous Once    metoprolol succinate  50 mg Oral Daily    metronidazole  500 mg Intravenous Q8H    pregabalin  150 mg Oral QHS     PRN Meds:ondansetron, ondansetron, oxyCODONE, oxyCODONE, promethazine (PHENERGAN) IVPB, sodium chloride 0.9%, sodium chloride 0.9%, vancomycin (VANCOCIN) IVPB, zolpidem     Objective:     Vital Signs (Most Recent):  Temp: 97.1 °F (36.2 °C) (04/13/19 0855)  Pulse: 92 (04/13/19 0855)  Resp: 18 (04/13/19 0855)  BP: (!) 154/90 (04/13/19 0855)  SpO2: 100 % (04/13/19 0855) Vital Signs (24h Range):  Temp:  [96.8 °F (36 °C)-98.3 °F (36.8 °C)] 97.1 °F (36.2 °C)  Pulse:  [] 92  Resp:  [16-18] 18  SpO2:  [96 %-100 %] 100 %  BP: (137-154)/(74-91) 154/90         Physical Exam   Constitutional: He is oriented to person, place, and time. He appears well-developed and well-nourished.   Neck: No JVD present. No tracheal deviation present.   Cardiovascular: Normal rate and regular rhythm.   Pulmonary/Chest: Effort normal. No respiratory distress.   Abdominal: Soft. He exhibits no distension. There is no tenderness. There is no guarding.   Musculoskeletal: He exhibits no edema.   L AKA with healthy red, perfusing tissue, no purulence or necrotic tissue noted, wound vac changed today  R AKA incision c/d/i, mepore dressing applied   Neurological: He is alert and oriented to person, place, and time.   Skin: Skin is warm and dry.   Nursing note and vitals reviewed.      Significant Labs:  CBC:   Recent  Labs   Lab 04/13/19  0549   WBC 13.09*   RBC 3.36*   HGB 9.3*   HCT 26.1*   *   MCV 78*   MCH 27.7   MCHC 35.6     CMP:   Recent Labs   Lab 04/13/19 0549   *   CALCIUM 8.8   ALBUMIN 2.0*   PROT 5.8*   *   K 3.8   CO2 26      BUN 8   CREATININE 0.6   ALKPHOS 100   ALT 17   AST 93*   BILITOT 0.2       Significant Diagnostics:  I have reviewed all pertinent imaging results/findings within the past 24 hours.

## 2019-04-13 NOTE — PROGRESS NOTES
Doctor at bedside for Lt stump woundvac drsg change. Premedicated with Dilaudid 0.5 mg per MD order. Will monitor.

## 2019-04-13 NOTE — PROGRESS NOTES
[facility]  Vascular Surgery  Progress Note    Patient Name: Pranay Colindres  MRN: [id number]  Admission Date: 4/10/2019  Primary Care Provider: Jenaro Torres MD    Subjective:       Post-Op Info:  Procedure(s) (LRB):  AMPUTATION, ABOVE KNEE (Right)  DEBRIDEMENT, WOUND (Left)  APPLICATION, WOUND VAC (Left)   2 Days Post-Op     Interval Hx: NAEON. AFVSS. Pain controlled. Wound vac in place with good suction. WBC increased o/n to 13 from 11.6. Now on cipro, prior cefepime.       Medications:  Continuous Infusions:   lactated ringers 100 mL/hr at 04/13/19 0818    ropivacaine (PF) 2 mg/ml (0.2%) 8 mL/hr (04/12/19 2146)     Scheduled Meds:   acetaminophen  1,000 mg Oral Q6H    ceFAZolin (ANCEF) IVPB  1 g Intravenous Q8H    celecoxib  200 mg Oral Daily    ciprofloxacin  400 mg Intravenous Q12H    HYDROmorphone  0.5 mg Intravenous Once    metoprolol succinate  50 mg Oral Daily    metronidazole  500 mg Intravenous Q8H    pregabalin  150 mg Oral QHS     PRN Meds:ondansetron, ondansetron, oxyCODONE, oxyCODONE, promethazine (PHENERGAN) IVPB, sodium chloride 0.9%, sodium chloride 0.9%, vancomycin (VANCOCIN) IVPB, zolpidem     Objective:     Vital Signs (Most Recent):  Temp: 97.1 °F (36.2 °C) (04/13/19 0855)  Pulse: 92 (04/13/19 0855)  Resp: 18 (04/13/19 0855)  BP: (!) 154/90 (04/13/19 0855)  SpO2: 100 % (04/13/19 0855) Vital Signs (24h Range):  Temp:  [96.8 °F (36 °C)-98.3 °F (36.8 °C)] 97.1 °F (36.2 °C)  Pulse:  [] 92  Resp:  [16-18] 18  SpO2:  [96 %-100 %] 100 %  BP: (137-154)/(74-91) 154/90         Physical Exam   Constitutional: He is oriented to person, place, and time. He appears well-developed and well-nourished.   Neck: No JVD present. No tracheal deviation present.   Cardiovascular: Normal rate and regular rhythm.   Pulmonary/Chest: Effort normal. No respiratory distress.   Abdominal: Soft. He exhibits no distension. There is no tenderness. There is no guarding.   Musculoskeletal:  He exhibits no edema.   L AKA with healthy red, perfusing tissue, no purulence or necrotic tissue noted, wound vac changed today  R AKA incision c/d/i, mepore dressing applied   Neurological: He is alert and oriented to person, place, and time.   Skin: Skin is warm and dry.   Nursing note and vitals reviewed.      Significant Labs:  CBC:   Recent Labs   Lab 04/13/19  0549   WBC 13.09*   RBC 3.36*   HGB 9.3*   HCT 26.1*   *   MCV 78*   MCH 27.7   MCHC 35.6     CMP:   Recent Labs   Lab 04/13/19  0549   *   CALCIUM 8.8   ALBUMIN 2.0*   PROT 5.8*   *   K 3.8   CO2 26      BUN 8   CREATININE 0.6   ALKPHOS 100   ALT 17   AST 93*   BILITOT 0.2       Significant Diagnostics:  I have reviewed all pertinent imaging results/findings within the past 24 hours.    Assessment/Plan:     * Critical lower limb ischemia  59 y.o. male with h/o HTN, arthritis, and cerebral palsy, tobacco abuse, PVD with previous aorto-bifem and L fem/pop. S/p emergent L AKA 3/20/19. Presents with fat necrosis L AKA stump and ischemic R foot rest pain. 1 Day Post-Op from L AKA washout/debridement with wound vac placement and Right AKA on 4/11/19. Left AKA with tracking muscle necrosis and purulence.    - Cardiac diet  - mIVF to 100cc/hr  - Ropivicaine pain catheter in place, appreciate anesthesia assistance  - Bedside wound vac change today (04/13/19)  - Broad spectrum abx: cipro, flagyl, ancef  - pain control prn  - PT/OT  - Pulmonary toilet    Dispo: Wound vac changed today due to increase in WBC o/n. Tissue without evidence of infection/necrosis. Plan to return to OR for left hip disarticulation on Tuesday 4/16        Hugo Bansal MD  Vascular Surgery  [facility]

## 2019-04-13 NOTE — ANESTHESIA POST-OP PAIN MANAGEMENT
Acute Pain Service Progress Note    Pranay Colindres is a 59 y.o., male, [id number].    Surgery:  Amputation, Above Knee  Debridement, Wound left  Debridement, wound VAC        Post Op Day #: 2    Catheter type: perineural  femoral    Infusion type: Ropivacaine 0.2%  8mL/hr basal    Problem List:    Active Hospital Problems    Diagnosis  POA    *Critical lower limb ischemia [I99.8]  Yes      Resolved Hospital Problems   No resolved problems to display.       Subjective:     General appearance of alert, oriented, no complaints   Pain with rest: 3    Numbers   Pain with movement: 5    Numbers   Side Effects    1. Pruritis No    2. Nausea No    3. Motor Blockade No, 0=Ability to raise lower extremities off bed    4. Sedation No, 1=awake and alert    Objective:     Catheter site clean, dry, intact      Vitals   Vitals:    04/13/19 0855   BP: (!) 154/90   Pulse: 92   Resp: 18   Temp: 36.2 °C (97.1 °F)        Labs    Admission on 04/10/2019   Component Date Value Ref Range Status    aPTT 04/10/2019 24.2  21.0 - 32.0 sec Final    WBC 04/10/2019 9.58  3.90 - 12.70 K/uL Final    RBC 04/10/2019 4.20* 4.60 - 6.20 M/uL Final    Hemoglobin 04/10/2019 11.5* 14.0 - 18.0 g/dL Final    Hematocrit 04/10/2019 32.9* 40.0 - 54.0 % Final    MCV 04/10/2019 78* 82 - 98 fL Final    MCH 04/10/2019 27.4  27.0 - 31.0 pg Final    MCHC 04/10/2019 35.0  32.0 - 36.0 g/dL Final    RDW 04/10/2019 13.7  11.5 - 14.5 % Final    Platelets 04/10/2019 571* 150 - 350 K/uL Final    MPV 04/10/2019 10.3  9.2 - 12.9 fL Final    Immature Granulocytes 04/10/2019 0.3  0.0 - 0.5 % Final    Gran # (ANC) 04/10/2019 7.3  1.8 - 7.7 K/uL Final    Immature Grans (Abs) 04/10/2019 0.03  0.00 - 0.04 K/uL Final    Lymph # 04/10/2019 1.5  1.0 - 4.8 K/uL Final    Mono # 04/10/2019 0.4  0.3 - 1.0 K/uL Final    Eos # 04/10/2019 0.3  0.0 - 0.5 K/uL Final    Baso # 04/10/2019 0.06  0.00 - 0.20 K/uL Final    nRBC 04/10/2019 0  0 /100 WBC Final    Gran%  04/10/2019 75.7* 38.0 - 73.0 % Final    Lymph% 04/10/2019 16.0* 18.0 - 48.0 % Final    Mono% 04/10/2019 4.6  4.0 - 15.0 % Final    Eosinophil% 04/10/2019 2.8  0.0 - 8.0 % Final    Basophil% 04/10/2019 0.6  0.0 - 1.9 % Final    Differential Method 04/10/2019 Automated   Final    Sodium 04/10/2019 136  136 - 145 mmol/L Final    Potassium 04/10/2019 3.7  3.5 - 5.1 mmol/L Final    Chloride 04/10/2019 98  95 - 110 mmol/L Final    CO2 04/10/2019 28  23 - 29 mmol/L Final    Glucose 04/10/2019 133* 70 - 110 mg/dL Final    BUN, Bld 04/10/2019 13  6 - 20 mg/dL Final    Creatinine 04/10/2019 0.7  0.5 - 1.4 mg/dL Final    Calcium 04/10/2019 9.9  8.7 - 10.5 mg/dL Final    Total Protein 04/10/2019 7.4  6.0 - 8.4 g/dL Final    Albumin 04/10/2019 2.8* 3.5 - 5.2 g/dL Final    Total Bilirubin 04/10/2019 0.4  0.1 - 1.0 mg/dL Final    Alkaline Phosphatase 04/10/2019 162* 55 - 135 U/L Final    AST 04/10/2019 54* 10 - 40 U/L Final    ALT 04/10/2019 34  10 - 44 U/L Final    Anion Gap 04/10/2019 10  8 - 16 mmol/L Final    eGFR if African American 04/10/2019 >60.0  >60 mL/min/1.73 m^2 Final    eGFR if non African American 04/10/2019 >60.0  >60 mL/min/1.73 m^2 Final    Magnesium 04/10/2019 1.7  1.6 - 2.6 mg/dL Final    Phosphorus 04/10/2019 3.4  2.7 - 4.5 mg/dL Final    Prothrombin Time 04/10/2019 10.1  9.0 - 12.5 sec Final    INR 04/10/2019 1.0  0.8 - 1.2 Final    WBC 04/11/2019 9.70  3.90 - 12.70 K/uL Final    RBC 04/11/2019 3.95* 4.60 - 6.20 M/uL Final    Hemoglobin 04/11/2019 10.7* 14.0 - 18.0 g/dL Final    Hematocrit 04/11/2019 31.1* 40.0 - 54.0 % Final    MCV 04/11/2019 79* 82 - 98 fL Final    MCH 04/11/2019 27.1  27.0 - 31.0 pg Final    MCHC 04/11/2019 34.4  32.0 - 36.0 g/dL Final    RDW 04/11/2019 13.7  11.5 - 14.5 % Final    Platelets 04/11/2019 488* 150 - 350 K/uL Final    MPV 04/11/2019 10.6  9.2 - 12.9 fL Final    Immature Granulocytes 04/11/2019 0.3  0.0 - 0.5 % Final    Gran # (ANC)  04/11/2019 6.4  1.8 - 7.7 K/uL Final    Immature Grans (Abs) 04/11/2019 0.03  0.00 - 0.04 K/uL Final    Lymph # 04/11/2019 1.7  1.0 - 4.8 K/uL Final    Mono # 04/11/2019 1.1* 0.3 - 1.0 K/uL Final    Eos # 04/11/2019 0.5  0.0 - 0.5 K/uL Final    Baso # 04/11/2019 0.06  0.00 - 0.20 K/uL Final    nRBC 04/11/2019 0  0 /100 WBC Final    Gran% 04/11/2019 65.7  38.0 - 73.0 % Final    Lymph% 04/11/2019 17.6* 18.0 - 48.0 % Final    Mono% 04/11/2019 11.0  4.0 - 15.0 % Final    Eosinophil% 04/11/2019 4.8  0.0 - 8.0 % Final    Basophil% 04/11/2019 0.6  0.0 - 1.9 % Final    Differential Method 04/11/2019 Automated   Final    Sodium 04/11/2019 136  136 - 145 mmol/L Final    Potassium 04/11/2019 4.0  3.5 - 5.1 mmol/L Final    Chloride 04/11/2019 99  95 - 110 mmol/L Final    CO2 04/11/2019 29  23 - 29 mmol/L Final    Glucose 04/11/2019 92  70 - 110 mg/dL Final    BUN, Bld 04/11/2019 10  6 - 20 mg/dL Final    Creatinine 04/11/2019 0.6  0.5 - 1.4 mg/dL Final    Calcium 04/11/2019 9.5  8.7 - 10.5 mg/dL Final    Total Protein 04/11/2019 7.1  6.0 - 8.4 g/dL Final    Albumin 04/11/2019 2.6* 3.5 - 5.2 g/dL Final    Total Bilirubin 04/11/2019 0.5  0.1 - 1.0 mg/dL Final    Alkaline Phosphatase 04/11/2019 147* 55 - 135 U/L Final    AST 04/11/2019 47* 10 - 40 U/L Final    ALT 04/11/2019 26  10 - 44 U/L Final    Anion Gap 04/11/2019 8  8 - 16 mmol/L Final    eGFR if African American 04/11/2019 >60.0  >60 mL/min/1.73 m^2 Final    eGFR if non African American 04/11/2019 >60.0  >60 mL/min/1.73 m^2 Final    Magnesium 04/11/2019 1.4* 1.6 - 2.6 mg/dL Final    Phosphorus 04/11/2019 3.8  2.7 - 4.5 mg/dL Final    Anaerobic Culture 04/11/2019 Culture in progress   Preliminary    Aerobic Bacterial Culture 04/11/2019    Preliminary                    Value:PRESUMPTIVE PROTEUS SPECIES  Few  Identification and susceptibility pending      Gram Stain Result 04/11/2019 Few WBC's   Final    Gram Stain Result 04/11/2019 No  organisms seen   Final    AFB Culture & Smear 04/11/2019 Culture in progress   Preliminary    AFB CULTURE STAIN 04/11/2019 No acid fast bacilli seen.   Final    Gram Stain Result 04/11/2019 Few WBC's   Final    Gram Stain Result 04/11/2019 No organisms seen   Final    Anaerobic Culture 04/11/2019 Culture in progress   Preliminary    Aerobic Bacterial Culture 04/11/2019    Preliminary                    Value:PRESUMPTIVE PROTEUS SPECIES  Few  Identification and susceptibility pending  Skin scottie also present      AFB Culture & Smear 04/11/2019 Culture in progress   Preliminary    AFB CULTURE STAIN 04/11/2019 No acid fast bacilli seen.   Final    WBC 04/12/2019 11.69  3.90 - 12.70 K/uL Final    RBC 04/12/2019 3.03* 4.60 - 6.20 M/uL Final    Hemoglobin 04/12/2019 8.2* 14.0 - 18.0 g/dL Final    Hematocrit 04/12/2019 23.3* 40.0 - 54.0 % Final    MCV 04/12/2019 77* 82 - 98 fL Final    MCH 04/12/2019 27.1  27.0 - 31.0 pg Final    MCHC 04/12/2019 35.2  32.0 - 36.0 g/dL Final    RDW 04/12/2019 13.8  11.5 - 14.5 % Final    Platelets 04/12/2019 424* 150 - 350 K/uL Final    MPV 04/12/2019 10.1  9.2 - 12.9 fL Final    Immature Granulocytes 04/12/2019 0.4  0.0 - 0.5 % Final    Gran # (ANC) 04/12/2019 9.2* 1.8 - 7.7 K/uL Final    Immature Grans (Abs) 04/12/2019 0.05* 0.00 - 0.04 K/uL Final    Lymph # 04/12/2019 1.1  1.0 - 4.8 K/uL Final    Mono # 04/12/2019 1.0  0.3 - 1.0 K/uL Final    Eos # 04/12/2019 0.2  0.0 - 0.5 K/uL Final    Baso # 04/12/2019 0.05  0.00 - 0.20 K/uL Final    nRBC 04/12/2019 0  0 /100 WBC Final    Gran% 04/12/2019 79.0* 38.0 - 73.0 % Final    Lymph% 04/12/2019 9.7* 18.0 - 48.0 % Final    Mono% 04/12/2019 8.4  4.0 - 15.0 % Final    Eosinophil% 04/12/2019 2.1  0.0 - 8.0 % Final    Basophil% 04/12/2019 0.4  0.0 - 1.9 % Final    Differential Method 04/12/2019 Automated   Final    Sodium 04/12/2019 137  136 - 145 mmol/L Final    Potassium 04/12/2019 3.9  3.5 - 5.1 mmol/L Final     Chloride 04/12/2019 104  95 - 110 mmol/L Final    CO2 04/12/2019 23  23 - 29 mmol/L Final    Glucose 04/12/2019 80  70 - 110 mg/dL Final    BUN, Bld 04/12/2019 8  6 - 20 mg/dL Final    Creatinine 04/12/2019 0.6  0.5 - 1.4 mg/dL Final    Calcium 04/12/2019 8.3* 8.7 - 10.5 mg/dL Final    Total Protein 04/12/2019 5.2* 6.0 - 8.4 g/dL Final    Albumin 04/12/2019 2.0* 3.5 - 5.2 g/dL Final    Total Bilirubin 04/12/2019 0.3  0.1 - 1.0 mg/dL Final    Alkaline Phosphatase 04/12/2019 98  55 - 135 U/L Final    AST 04/12/2019 58* 10 - 40 U/L Final    ALT 04/12/2019 15  10 - 44 U/L Final    Anion Gap 04/12/2019 10  8 - 16 mmol/L Final    eGFR if African American 04/12/2019 >60.0  >60 mL/min/1.73 m^2 Final    eGFR if non African American 04/12/2019 >60.0  >60 mL/min/1.73 m^2 Final    Magnesium 04/12/2019 1.3* 1.6 - 2.6 mg/dL Final    Phosphorus 04/12/2019 3.2  2.7 - 4.5 mg/dL Final    WBC 04/13/2019 13.09* 3.90 - 12.70 K/uL Final    RBC 04/13/2019 3.36* 4.60 - 6.20 M/uL Final    Hemoglobin 04/13/2019 9.3* 14.0 - 18.0 g/dL Final    Hematocrit 04/13/2019 26.1* 40.0 - 54.0 % Final    MCV 04/13/2019 78* 82 - 98 fL Final    MCH 04/13/2019 27.7  27.0 - 31.0 pg Final    MCHC 04/13/2019 35.6  32.0 - 36.0 g/dL Final    RDW 04/13/2019 13.6  11.5 - 14.5 % Final    Platelets 04/13/2019 480* 150 - 350 K/uL Final    MPV 04/13/2019 10.0  9.2 - 12.9 fL Final    Immature Granulocytes 04/13/2019 0.6* 0.0 - 0.5 % Final    Gran # (ANC) 04/13/2019 10.4* 1.8 - 7.7 K/uL Final    Immature Grans (Abs) 04/13/2019 0.08* 0.00 - 0.04 K/uL Final    Lymph # 04/13/2019 1.4  1.0 - 4.8 K/uL Final    Mono # 04/13/2019 0.8  0.3 - 1.0 K/uL Final    Eos # 04/13/2019 0.4  0.0 - 0.5 K/uL Final    Baso # 04/13/2019 0.05  0.00 - 0.20 K/uL Final    nRBC 04/13/2019 0  0 /100 WBC Final    Gran% 04/13/2019 79.2* 38.0 - 73.0 % Final    Lymph% 04/13/2019 10.9* 18.0 - 48.0 % Final    Mono% 04/13/2019 6.1  4.0 - 15.0 % Final    Eosinophil%  04/13/2019 2.8  0.0 - 8.0 % Final    Basophil% 04/13/2019 0.4  0.0 - 1.9 % Final    Differential Method 04/13/2019 Automated   Final    Sodium 04/13/2019 134* 136 - 145 mmol/L Final    Potassium 04/13/2019 3.8  3.5 - 5.1 mmol/L Final    Chloride 04/13/2019 101  95 - 110 mmol/L Final    CO2 04/13/2019 26  23 - 29 mmol/L Final    Glucose 04/13/2019 137* 70 - 110 mg/dL Final    BUN, Bld 04/13/2019 8  6 - 20 mg/dL Final    Creatinine 04/13/2019 0.6  0.5 - 1.4 mg/dL Final    Calcium 04/13/2019 8.8  8.7 - 10.5 mg/dL Final    Total Protein 04/13/2019 5.8* 6.0 - 8.4 g/dL Final    Albumin 04/13/2019 2.0* 3.5 - 5.2 g/dL Final    Total Bilirubin 04/13/2019 0.2  0.1 - 1.0 mg/dL Final    Alkaline Phosphatase 04/13/2019 100  55 - 135 U/L Final    AST 04/13/2019 93* 10 - 40 U/L Final    ALT 04/13/2019 17  10 - 44 U/L Final    Anion Gap 04/13/2019 7* 8 - 16 mmol/L Final    eGFR if African American 04/13/2019 >60.0  >60 mL/min/1.73 m^2 Final    eGFR if non African American 04/13/2019 >60.0  >60 mL/min/1.73 m^2 Final    Magnesium 04/13/2019 1.5* 1.6 - 2.6 mg/dL Final    Phosphorus 04/13/2019 3.0  2.7 - 4.5 mg/dL Final        Meds   Current Facility-Administered Medications   Medication Dose Route Frequency Provider Last Rate Last Dose    acetaminophen tablet 1,000 mg  1,000 mg Oral Q6H Donald Pratt MD   1,000 mg at 04/13/19 0645    ceFAZolin injection 1 g  1 g Intravenous Q8H Denisha Chambers MD   1 g at 04/13/19 0645    celecoxib capsule 200 mg  200 mg Oral Daily Ignacio Nguyen MD   200 mg at 04/13/19 0857    ciprofloxacin (CIPRO)400mg/200ml D5W IVPB 400 mg  400 mg Intravenous Q12H Jamir Braun  mL/hr at 04/13/19 0456 400 mg at 04/13/19 0456    HYDROmorphone injection 0.5 mg  0.5 mg Intravenous Once Hugo Bansal MD        lactated ringers infusion   Intravenous Continuous Jamir Braun  mL/hr at 04/13/19 0818      magnesium oxide tablet 800 mg  800 mg Oral BID Hugo  Pietro Bansal MD        metoprolol succinate (TOPROL-XL) 24 hr tablet 50 mg  50 mg Oral Daily Camacho Pete MD   50 mg at 04/13/19 0857    metronidazole IVPB 500 mg  500 mg Intravenous Q8H Denisha Chambers  mL/hr at 04/13/19 0645 500 mg at 04/13/19 0645    ondansetron injection 4 mg  4 mg Intravenous Q6H PRN Denisha Chambers MD        ondansetron injection 4 mg  4 mg Intravenous Q12H PRN Ignacio Nguyen MD        oxyCODONE immediate release tablet 5 mg  5 mg Oral Q4H PRN Denisha Chambers MD        oxyCODONE immediate release tablet Tab 10 mg  10 mg Oral Q4H PRN Denisha Chambers MD   10 mg at 04/12/19 1353    pregabalin capsule 150 mg  150 mg Oral QHS Ignacio Nguyen MD   150 mg at 04/12/19 2146    promethazine (PHENERGAN) 6.25 mg in dextrose 5 % 50 mL IVPB  6.25 mg Intravenous Q6H PRN Denisha Chambers MD        ropivacaine (PF) 2 mg/ml (0.2%) infusion  8 mL/hr Perineural Continuous Ignacio Nguyen MD 8 mL/hr at 04/12/19 2146 8 mL/hr at 04/12/19 2146    sodium chloride 0.9% flush 10 mL  10 mL Intravenous PRN James Montero MD        sodium chloride 0.9% flush 3 mL  3 mL Intravenous PRN Denisha Chambers MD        vancomycin in dextrose 5 % 1 gram/250 mL IVPB 1,000 mg  1,000 mg Intravenous On Call Procedure Denisha Chambers MD   1,000 mg at 04/11/19 1042    zolpidem tablet 5 mg  5 mg Oral Nightly PRN Isaías Ronquillo MD   5 mg at 04/12/19 2146     Assessment:     Pain control adequate    Plan:     Patient doing well, continue present treatment.  In good spirits, took oxycodone 10mg twice yesterday.  Has temporary expected increase in pain with wound vac change.    Sharath Bran MD PGY3/CA2  Anesthesiology  [facility]  Pager: [phone]    Staff:   Patient seen on rounds with Dr. Bran.  Patient is doing well-states pain is well controlled.  Will continue present regimen.

## 2019-04-13 NOTE — PT/OT/SLP PROGRESS
Occupational Therapy      Patient Name:  Pranay Colindres   MRN:  [id number]    OT eval and treat orders received and acknowledged. OT attempted eval in PM, however, pt requests therapist to return tomorrow. Pt states he is in a lot of pain today and would like to rest. OT to return tomorrow for eval.     Mandy Sanders, OT  4/13/2019

## 2019-04-14 PROCEDURE — 25000003 PHARM REV CODE 250: Performed by: STUDENT IN AN ORGANIZED HEALTH CARE EDUCATION/TRAINING PROGRAM

## 2019-04-14 PROCEDURE — 63600175 PHARM REV CODE 636 W HCPCS: Performed by: SURGERY

## 2019-04-14 PROCEDURE — 97165 OT EVAL LOW COMPLEX 30 MIN: CPT

## 2019-04-14 PROCEDURE — 63600175 PHARM REV CODE 636 W HCPCS: Performed by: STUDENT IN AN ORGANIZED HEALTH CARE EDUCATION/TRAINING PROGRAM

## 2019-04-14 PROCEDURE — 97110 THERAPEUTIC EXERCISES: CPT

## 2019-04-14 PROCEDURE — S0030 INJECTION, METRONIDAZOLE: HCPCS | Performed by: STUDENT IN AN ORGANIZED HEALTH CARE EDUCATION/TRAINING PROGRAM

## 2019-04-14 PROCEDURE — 11000001 HC ACUTE MED/SURG PRIVATE ROOM

## 2019-04-14 PROCEDURE — 97162 PT EVAL MOD COMPLEX 30 MIN: CPT

## 2019-04-14 RX ADMIN — CEFAZOLIN 1 G: 1 INJECTION, POWDER, FOR SOLUTION INTRAMUSCULAR; INTRAVENOUS at 10:04

## 2019-04-14 RX ADMIN — CIPROFLOXACIN 400 MG: 2 INJECTION, SOLUTION INTRAVENOUS at 05:04

## 2019-04-14 RX ADMIN — METRONIDAZOLE 500 MG: 500 INJECTION, SOLUTION INTRAVENOUS at 03:04

## 2019-04-14 RX ADMIN — ROPIVACAINE HYDROCHLORIDE 8 ML/HR: 2 INJECTION, SOLUTION EPIDURAL; INFILTRATION at 02:04

## 2019-04-14 RX ADMIN — ROPIVACAINE HYDROCHLORIDE 8 ML/HR: 2 INJECTION, SOLUTION EPIDURAL; INFILTRATION at 03:04

## 2019-04-14 RX ADMIN — METOPROLOL SUCCINATE 50 MG: 50 TABLET, EXTENDED RELEASE ORAL at 11:04

## 2019-04-14 RX ADMIN — CEFAZOLIN 1 G: 1 INJECTION, POWDER, FOR SOLUTION INTRAMUSCULAR; INTRAVENOUS at 03:04

## 2019-04-14 RX ADMIN — CELECOXIB 200 MG: 200 CAPSULE ORAL at 10:04

## 2019-04-14 RX ADMIN — OXYCODONE HYDROCHLORIDE 10 MG: 10 TABLET ORAL at 10:04

## 2019-04-14 RX ADMIN — ZOLPIDEM TARTRATE 5 MG: 5 TABLET ORAL at 10:04

## 2019-04-14 RX ADMIN — METRONIDAZOLE 500 MG: 500 INJECTION, SOLUTION INTRAVENOUS at 10:04

## 2019-04-14 RX ADMIN — PREGABALIN 150 MG: 50 CAPSULE ORAL at 09:04

## 2019-04-14 RX ADMIN — ACETAMINOPHEN 1000 MG: 500 TABLET ORAL at 12:04

## 2019-04-14 RX ADMIN — ACETAMINOPHEN 1000 MG: 500 TABLET ORAL at 10:04

## 2019-04-14 NOTE — ASSESSMENT & PLAN NOTE
59 y.o. male with h/o HTN, arthritis, and cerebral palsy, tobacco abuse, PVD with previous aorto-bifem and L fem/pop. S/p emergent L AKA 3/20/19. Presents with fat necrosis L AKA stump and ischemic R foot rest pain. 3 Days Post-Op from L AKA washout/debridement with wound vac placement and Right AKA on 4/11/19. Left AKA with tracking muscle necrosis and purulence.    - Cardiac diet  - mIVF to 100cc/hr  - Ropivicaine pain catheter remain in place, appreciate anesthesia assistance  - Bedside wound vac change today (04/13/19)  - Broad spectrum abx: cipro, flagyl, ancef  - Cx grew ESBL  - pain control prn  - PT/OT  - Pulmonary toilet    Dispo: L AKA culture grew proteus. On broad spectrum. On schedule for hip disarticulation Tuesday.

## 2019-04-14 NOTE — PROGRESS NOTES
[facility]  Vascular Surgery  Progress Note    Patient Name: Pranay Colindres  MRN: [id number]  Admission Date: 4/10/2019  Primary Care Provider: Jenaro Torres MD    Subjective:       Post-Op Info:  Procedure(s) (LRB):  AMPUTATION, ABOVE KNEE (Right)  DEBRIDEMENT, WOUND (Left)  APPLICATION, WOUND VAC (Left)   3 Days Post-Op     Interval Hx: NAEON. AFVSS. Pain controlled. Wound vac in place with good suction. Tolerating diet. Left AKA culture grew proteus. Epic down for past 16 hours.      Medications:  Continuous Infusions:   lactated ringers 100 mL/hr at 04/13/19 1133    ropivacaine (PF) 2 mg/ml (0.2%) 8 mL/hr (04/14/19 0225)     Scheduled Meds:   ceFAZolin (ANCEF) IVPB  1 g Intravenous Q8H    celecoxib  200 mg Oral Daily    ciprofloxacin  400 mg Intravenous Q12H    HYDROmorphone  0.5 mg Intravenous Once    metoprolol succinate  50 mg Oral Daily    metronidazole  500 mg Intravenous Q8H    pregabalin  150 mg Oral QHS     PRN Meds:ondansetron, ondansetron, oxyCODONE, oxyCODONE, promethazine (PHENERGAN) IVPB, sodium chloride 0.9%, sodium chloride 0.9%, vancomycin (VANCOCIN) IVPB, zolpidem     Objective:     Vital Signs (Most Recent):  Temp: 98 °F (36.7 °C) (04/13/19 2310)  Pulse: 99 (04/13/19 2310)  Resp: 18 (04/13/19 2310)  BP: 131/85 (04/13/19 2310)  SpO2: 99 % (04/13/19 2310) Vital Signs (24h Range):  Temp:  [98 °F (36.7 °C)-98.3 °F (36.8 °C)] 98 °F (36.7 °C)  Pulse:  [99] 99  Resp:  [18] 18  SpO2:  [99 %-100 %] 99 %  BP: (131-145)/(85-89) 131/85         Physical Exam   Constitutional: He is oriented to person, place, and time. He appears well-developed and well-nourished.   Neck: No JVD present. No tracheal deviation present.   Cardiovascular: Normal rate and regular rhythm.   Pulmonary/Chest: Effort normal. No respiratory distress.   Abdominal: Soft. He exhibits no distension. There is no tenderness. There is no guarding.   Musculoskeletal: He exhibits no edema.   L AKA with healthy  red, perfusing tissue, no purulence or necrotic tissue noted, wound vac changed 04/13  R AKA incision c/d/i, mepore dressing reapplied   Neurological: He is alert and oriented to person, place, and time.   Skin: Skin is warm and dry.   Nursing note and vitals reviewed.      Significant Labs:  CBC:   Recent Labs   Lab 04/13/19  0549   WBC 13.09*   RBC 3.36*   HGB 9.3*   HCT 26.1*   *   MCV 78*   MCH 27.7   MCHC 35.6     CMP:   Recent Labs   Lab 04/13/19  0549   *   CALCIUM 8.8   ALBUMIN 2.0*   PROT 5.8*   *   K 3.8   CO2 26      BUN 8   CREATININE 0.6   ALKPHOS 100   ALT 17   AST 93*   BILITOT 0.2       Significant Diagnostics:  I have reviewed all pertinent imaging results/findings within the past 24 hours.    Assessment/Plan:     * Critical lower limb ischemia  59 y.o. male with h/o HTN, arthritis, and cerebral palsy, tobacco abuse, PVD with previous aorto-bifem and L fem/pop. S/p emergent L AKA 3/20/19. Presents with fat necrosis L AKA stump and ischemic R foot rest pain. 3 Days Post-Op from L AKA washout/debridement with wound vac placement and Right AKA on 4/11/19. Left AKA with tracking muscle necrosis and purulence.    - Cardiac diet  - mIVF to 100cc/hr  - Ropivicaine pain catheter remain in place, appreciate anesthesia assistance  - Bedside wound vac change today (04/13/19)  - Broad spectrum abx: cipro, flagyl, ancef  - Cx grew ESBL  - pain control prn  - PT/OT  - Pulmonary toilet    Dispo: L AKA culture grew proteus. On broad spectrum. On schedule for hip disarticulation Tuesday.        Hugo Bansal MD  Vascular Surgery  [facility]

## 2019-04-14 NOTE — SUBJECTIVE & OBJECTIVE
Interval Hx: NAEON. AFVSS. Pain controlled. Wound vac in place with good suction. Tolerating diet. Left AKA culture grew proteus. Epic down for past 16 hours.      Medications:  Continuous Infusions:   lactated ringers 100 mL/hr at 04/13/19 1133    ropivacaine (PF) 2 mg/ml (0.2%) 8 mL/hr (04/14/19 0225)     Scheduled Meds:   ceFAZolin (ANCEF) IVPB  1 g Intravenous Q8H    celecoxib  200 mg Oral Daily    ciprofloxacin  400 mg Intravenous Q12H    HYDROmorphone  0.5 mg Intravenous Once    metoprolol succinate  50 mg Oral Daily    metronidazole  500 mg Intravenous Q8H    pregabalin  150 mg Oral QHS     PRN Meds:ondansetron, ondansetron, oxyCODONE, oxyCODONE, promethazine (PHENERGAN) IVPB, sodium chloride 0.9%, sodium chloride 0.9%, vancomycin (VANCOCIN) IVPB, zolpidem     Objective:     Vital Signs (Most Recent):  Temp: 98 °F (36.7 °C) (04/13/19 2310)  Pulse: 99 (04/13/19 2310)  Resp: 18 (04/13/19 2310)  BP: 131/85 (04/13/19 2310)  SpO2: 99 % (04/13/19 2310) Vital Signs (24h Range):  Temp:  [98 °F (36.7 °C)-98.3 °F (36.8 °C)] 98 °F (36.7 °C)  Pulse:  [99] 99  Resp:  [18] 18  SpO2:  [99 %-100 %] 99 %  BP: (131-145)/(85-89) 131/85         Physical Exam   Constitutional: He is oriented to person, place, and time. He appears well-developed and well-nourished.   Neck: No JVD present. No tracheal deviation present.   Cardiovascular: Normal rate and regular rhythm.   Pulmonary/Chest: Effort normal. No respiratory distress.   Abdominal: Soft. He exhibits no distension. There is no tenderness. There is no guarding.   Musculoskeletal: He exhibits no edema.   L AKA with healthy red, perfusing tissue, no purulence or necrotic tissue noted, wound vac changed 04/13  R AKA incision c/d/i, mepore dressing reapplied   Neurological: He is alert and oriented to person, place, and time.   Skin: Skin is warm and dry.   Nursing note and vitals reviewed.      Significant Labs:  CBC:   Recent Labs   Lab 04/13/19  0549   WBC 13.09*    RBC 3.36*   HGB 9.3*   HCT 26.1*   *   MCV 78*   MCH 27.7   MCHC 35.6     CMP:   Recent Labs   Lab 04/13/19  0549   *   CALCIUM 8.8   ALBUMIN 2.0*   PROT 5.8*   *   K 3.8   CO2 26      BUN 8   CREATININE 0.6   ALKPHOS 100   ALT 17   AST 93*   BILITOT 0.2       Significant Diagnostics:  I have reviewed all pertinent imaging results/findings within the past 24 hours.

## 2019-04-15 LAB
ALBUMIN SERPL BCP-MCNC: 2.3 G/DL (ref 3.5–5.2)
ALP SERPL-CCNC: 101 U/L (ref 55–135)
ALT SERPL W/O P-5'-P-CCNC: 16 U/L (ref 10–44)
ANION GAP SERPL CALC-SCNC: 11 MMOL/L (ref 8–16)
AST SERPL-CCNC: 77 U/L (ref 10–40)
BASOPHILS # BLD AUTO: 0.07 K/UL (ref 0–0.2)
BASOPHILS NFR BLD: 0.5 % (ref 0–1.9)
BILIRUB SERPL-MCNC: 0.2 MG/DL (ref 0.1–1)
BUN SERPL-MCNC: 7 MG/DL (ref 6–20)
CALCIUM SERPL-MCNC: 9.1 MG/DL (ref 8.7–10.5)
CHLORIDE SERPL-SCNC: 101 MMOL/L (ref 95–110)
CO2 SERPL-SCNC: 25 MMOL/L (ref 23–29)
CREAT SERPL-MCNC: 0.6 MG/DL (ref 0.5–1.4)
DIFFERENTIAL METHOD: ABNORMAL
EOSINOPHIL # BLD AUTO: 0.6 K/UL (ref 0–0.5)
EOSINOPHIL NFR BLD: 4.3 % (ref 0–8)
ERYTHROCYTE [DISTWIDTH] IN BLOOD BY AUTOMATED COUNT: 13.5 % (ref 11.5–14.5)
EST. GFR  (AFRICAN AMERICAN): >60 ML/MIN/1.73 M^2
EST. GFR  (NON AFRICAN AMERICAN): >60 ML/MIN/1.73 M^2
GLUCOSE SERPL-MCNC: 94 MG/DL (ref 70–110)
HCT VFR BLD AUTO: 28.2 % (ref 40–54)
HGB BLD-MCNC: 10.2 G/DL (ref 14–18)
IMM GRANULOCYTES # BLD AUTO: 0.21 K/UL (ref 0–0.04)
IMM GRANULOCYTES NFR BLD AUTO: 1.5 % (ref 0–0.5)
LYMPHOCYTES # BLD AUTO: 2.4 K/UL (ref 1–4.8)
LYMPHOCYTES NFR BLD: 17 % (ref 18–48)
MAGNESIUM SERPL-MCNC: 1.5 MG/DL (ref 1.6–2.6)
MCH RBC QN AUTO: 27.7 PG (ref 27–31)
MCHC RBC AUTO-ENTMCNC: 36.2 G/DL (ref 32–36)
MCV RBC AUTO: 77 FL (ref 82–98)
MONOCYTES # BLD AUTO: 1.2 K/UL (ref 0.3–1)
MONOCYTES NFR BLD: 8.4 % (ref 4–15)
NEUTROPHILS # BLD AUTO: 9.5 K/UL (ref 1.8–7.7)
NEUTROPHILS NFR BLD: 68.3 % (ref 38–73)
NRBC BLD-RTO: 0 /100 WBC
PHOSPHATE SERPL-MCNC: 3.4 MG/DL (ref 2.7–4.5)
PLATELET # BLD AUTO: 490 K/UL (ref 150–350)
PMV BLD AUTO: 10.3 FL (ref 9.2–12.9)
POTASSIUM SERPL-SCNC: 4.2 MMOL/L (ref 3.5–5.1)
PROT SERPL-MCNC: 6.3 G/DL (ref 6–8.4)
RBC # BLD AUTO: 3.68 M/UL (ref 4.6–6.2)
SODIUM SERPL-SCNC: 137 MMOL/L (ref 136–145)
WBC # BLD AUTO: 13.86 K/UL (ref 3.9–12.7)

## 2019-04-15 PROCEDURE — 63600175 PHARM REV CODE 636 W HCPCS: Performed by: STUDENT IN AN ORGANIZED HEALTH CARE EDUCATION/TRAINING PROGRAM

## 2019-04-15 PROCEDURE — 25000003 PHARM REV CODE 250: Performed by: STUDENT IN AN ORGANIZED HEALTH CARE EDUCATION/TRAINING PROGRAM

## 2019-04-15 PROCEDURE — 63600175 PHARM REV CODE 636 W HCPCS: Performed by: SURGERY

## 2019-04-15 PROCEDURE — 25000003 PHARM REV CODE 250: Performed by: SURGERY

## 2019-04-15 PROCEDURE — 36415 COLL VENOUS BLD VENIPUNCTURE: CPT

## 2019-04-15 PROCEDURE — 11000001 HC ACUTE MED/SURG PRIVATE ROOM

## 2019-04-15 PROCEDURE — 80053 COMPREHEN METABOLIC PANEL: CPT

## 2019-04-15 PROCEDURE — 84100 ASSAY OF PHOSPHORUS: CPT

## 2019-04-15 PROCEDURE — S0030 INJECTION, METRONIDAZOLE: HCPCS | Performed by: STUDENT IN AN ORGANIZED HEALTH CARE EDUCATION/TRAINING PROGRAM

## 2019-04-15 PROCEDURE — 83735 ASSAY OF MAGNESIUM: CPT

## 2019-04-15 PROCEDURE — 86920 COMPATIBILITY TEST SPIN: CPT

## 2019-04-15 PROCEDURE — 85025 COMPLETE CBC W/AUTO DIFF WBC: CPT

## 2019-04-15 RX ORDER — BISACODYL 10 MG
10 SUPPOSITORY, RECTAL RECTAL DAILY PRN
Status: DISCONTINUED | OUTPATIENT
Start: 2019-04-15 | End: 2019-04-25 | Stop reason: HOSPADM

## 2019-04-15 RX ORDER — BISACODYL 10 MG
10 SUPPOSITORY, RECTAL RECTAL DAILY PRN
Status: DISCONTINUED | OUTPATIENT
Start: 2019-04-15 | End: 2019-04-15

## 2019-04-15 RX ORDER — LANOLIN ALCOHOL/MO/W.PET/CERES
800 CREAM (GRAM) TOPICAL ONCE
Status: COMPLETED | OUTPATIENT
Start: 2019-04-15 | End: 2019-04-15

## 2019-04-15 RX ADMIN — CELECOXIB 200 MG: 200 CAPSULE ORAL at 08:04

## 2019-04-15 RX ADMIN — SODIUM CHLORIDE, SODIUM LACTATE, POTASSIUM CHLORIDE, AND CALCIUM CHLORIDE: .6; .31; .03; .02 INJECTION, SOLUTION INTRAVENOUS at 03:04

## 2019-04-15 RX ADMIN — OXYCODONE HYDROCHLORIDE 10 MG: 10 TABLET ORAL at 06:04

## 2019-04-15 RX ADMIN — BISACODYL 10 MG: 10 SUPPOSITORY RECTAL at 12:04

## 2019-04-15 RX ADMIN — PREGABALIN 150 MG: 50 CAPSULE ORAL at 10:04

## 2019-04-15 RX ADMIN — METOPROLOL SUCCINATE 50 MG: 50 TABLET, EXTENDED RELEASE ORAL at 08:04

## 2019-04-15 RX ADMIN — ZOLPIDEM TARTRATE 5 MG: 5 TABLET ORAL at 10:04

## 2019-04-15 RX ADMIN — CEFAZOLIN 1 G: 1 INJECTION, POWDER, FOR SOLUTION INTRAMUSCULAR; INTRAVENOUS at 06:04

## 2019-04-15 RX ADMIN — METRONIDAZOLE 500 MG: 500 INJECTION, SOLUTION INTRAVENOUS at 06:04

## 2019-04-15 RX ADMIN — OXYCODONE HYDROCHLORIDE 10 MG: 10 TABLET ORAL at 10:04

## 2019-04-15 RX ADMIN — CIPROFLOXACIN 400 MG: 2 INJECTION, SOLUTION INTRAVENOUS at 05:04

## 2019-04-15 RX ADMIN — ROPIVACAINE HYDROCHLORIDE 8 ML/HR: 2 INJECTION, SOLUTION EPIDURAL; INFILTRATION at 01:04

## 2019-04-15 RX ADMIN — MAGNESIUM OXIDE TAB 400 MG (241.3 MG ELEMENTAL MG) 800 MG: 400 (241.3 MG) TAB at 08:04

## 2019-04-15 NOTE — PLAN OF CARE
Problem: Adult Inpatient Plan of Care  Goal: Plan of Care Review  Pranay Colindres is a 59 y.o. male admitted to [facility] 4/10/19 for Critical lower limb ischemia; underwent (R) AKA on 4/11 with wound vac placement to previous (L) AKA due to infection. Pranay Colindres tolerated evaluation well today. He was in good spirits, amenable to mobilizing. Requires mod-max (A) to transition from supine to sitting, max (A) to scoot pivot from bed into wheelchair due to weakness and frequent posterior LOB. Once in wheelchair, he was able to propel himself 300 ft in hallways using his (B)UE, navigates obstacles and turns without difficulty. Up in wheelchair participating in UE theraband exercise with OT upon my leaving. Checked on patient in early PM at ~1400, nsg had safely assisted back to bed; pt in good spirits, comfortable in bed. Discussed PT role, POC, goals and recommendations with patient and/or family; verbalized understanding. Pranay Colindres would benefit from acute PT services to promote mobility during this admission and improve return to PLOF.    Recs for return to [facility] upon discharge.    Delvin Hollis, PT  4/15/2019

## 2019-04-15 NOTE — PROGRESS NOTES
[facility]  Vascular Surgery  Progress Note    Patient Name: Pranay Colindres  MRN: [id number]  Admission Date: 4/10/2019  Primary Care Provider: Jenaro Torres MD    Subjective:     Interval History: No acute events overnight.  Remains afebrile. WBC stably elevated at 13.8. Vac to L AKA with good seal.     Post-Op Info:  Procedure(s) (LRB):  AMPUTATION, ABOVE KNEE (Right)  DEBRIDEMENT, WOUND (Left)  APPLICATION, WOUND VAC (Left)   4 Days Post-Op       Medications:  Continuous Infusions:   lactated ringers 100 mL/hr at 04/13/19 1133    ropivacaine (PF) 2 mg/ml (0.2%) 8 mL/hr (04/15/19 0134)     Scheduled Meds:   ceFAZolin (ANCEF) IVPB  1 g Intravenous Q8H    celecoxib  200 mg Oral Daily    ciprofloxacin  400 mg Intravenous Q12H    HYDROmorphone  0.5 mg Intravenous Once    metoprolol succinate  50 mg Oral Daily    metronidazole  500 mg Intravenous Q8H    pregabalin  150 mg Oral QHS     PRN Meds:ondansetron, ondansetron, oxyCODONE, oxyCODONE, promethazine (PHENERGAN) IVPB, sodium chloride 0.9%, sodium chloride 0.9%, vancomycin (VANCOCIN) IVPB, zolpidem     Objective:     Vital Signs (Most Recent):  Temp: 98.4 °F (36.9 °C) (04/14/19 2330)  Pulse: 94 (04/14/19 2330)  Resp: 18 (04/14/19 2330)  BP: (!) 142/90 (04/14/19 2330)  SpO2: 98 % (04/14/19 2330) Vital Signs (24h Range):  Temp:  [98.1 °F (36.7 °C)-98.4 °F (36.9 °C)] 98.4 °F (36.9 °C)  Pulse:  [90-94] 94  Resp:  [18] 18  SpO2:  [98 %] 98 %  BP: (142-143)/(80-90) 142/90         Physical Exam   Constitutional: He is oriented to person, place, and time. He appears well-developed and well-nourished.   Neck: No JVD present. No tracheal deviation present.   Cardiovascular: Normal rate and regular rhythm.   Pulmonary/Chest: Effort normal. No respiratory distress.   Abdominal: Soft. He exhibits no distension. There is no tenderness. There is no guarding.   Musculoskeletal: He exhibits no edema.   L AKA with healthy red, perfusing tissue, no  purulence or necrotic tissue noted, wound vac changed 04/13  R AKA incision c/d/i, mepore dressing reapplied   Neurological: He is alert and oriented to person, place, and time.   Skin: Skin is warm and dry.   Nursing note and vitals reviewed.      Significant Labs:  CBC:   Recent Labs   Lab 04/15/19  0350   WBC 13.86*   RBC 3.68*   HGB 10.2*   HCT 28.2*   *   MCV 77*   MCH 27.7   MCHC 36.2*     CMP:   Recent Labs   Lab 04/15/19  0350   GLU 94   CALCIUM 9.1   ALBUMIN 2.3*   PROT 6.3      K 4.2   CO2 25      BUN 7   CREATININE 0.6   ALKPHOS 101   ALT 16   AST 77*   BILITOT 0.2       Significant Diagnostics:  I have reviewed all pertinent imaging results/findings within the past 24 hours.    Assessment/Plan:     * Critical lower limb ischemia  59 y.o. male with h/o HTN, arthritis, and cerebral palsy, tobacco abuse, PVD with previous aorto-bifem and L fem/pop. S/p emergent L AKA 3/20/19. Presents with fat necrosis L AKA stump and ischemic R foot rest pain. 4 Days Post-Op from L AKA washout/debridement with wound vac placement and Right AKA on 4/11/19. Left AKA with tracking muscle necrosis and purulence.    - Cardiac diet, NPO at midnight  - mIVF at 100cc/hr  - Ropivicaine pain catheter remain in place, appreciate anesthesia assistance  - Bedside wound vac change (04/13/19)  - L AKA wound cx grew Proteus Mirabilus: continue IV cipro  - pain control prn  - PT/OT  - Pulmonary toilet    Dispo: On schedule for hip disarticulation tomorrow, 4/16. Will obtain consent today.      Denisha Chambers MD  Vascular Surgery  [facility]

## 2019-04-15 NOTE — PT/OT/SLP EVAL
Occupational Therapy   Evaluation/ Treatment    Name: Pranay Colindres  MRN: [id number]  Admitting Diagnosis:  Critical lower limb ischemia 4 Days Post-Op    Recommendations:     Discharge Recommendations: nursing facility, skilled  Discharge Equipment Recommendations:  commode  Barriers to discharge:  None    Assessment:     Pranay Colindres is a 59 y.o. male with a medical diagnosis of Critical lower limb ischemia.  He presents with the following performance deficits affecting function: weakness, impaired endurance, decreased lower extremity function, impaired self care skills, impaired functional mobilty, impaired balance, pain.      OT eval complete. Pt tolerated session well. Pt presents w/ good overall B UE strength and ROM. He currently requires assistance for all functional mobility and LB self-care tasks. He presents w/ decreased balance while sitting EOB as he attempts to release one UE from bed rail due to B residual limb pain. Pt continues to benefit from skilled OT to address the above listed impairments.     Rehab Prognosis: Good; patient would benefit from acute skilled OT services to address these deficits and reach maximum level of function.       Plan:     Patient to be seen 4 x/week to address the above listed problems via self-care/home management, therapeutic activities, therapeutic exercises  · Plan of Care Expires: 05/12/19  · Plan of Care Reviewed with: patient    Subjective     Chief Complaint: pain in residual limbs  Patient/Family Comments/goals: to return to SNF where he was receiving therapy prior to current admit     Occupational Profile:  Living Environment: Pt lives with his brother in [facility] w/ ramp entry; he uses a walk-in shower  Previous level of function: PTA, pt was receiving therapy at a SNF in [place] s/p L AKA. He required min- CGA for all functional tasks and RW for functional mobility   Equipment Used at Home:  wheelchair, shower chair, walker, standard(electric  scooter)  Assistance upon Discharge: Pt states he will be returning to the SNF and will have assistance from the staff    Pain/Comfort:  ·      Patients cultural, spiritual, Synagogue conflicts given the current situation: no    Objective:     Communicated with: RN prior to session.  Patient found HOB elevated with peripheral IV, wound vac upon OT entry to room.    General Precautions: Standard, fall   Orthopedic Precautions:(B AKA)   Braces:       Occupational Performance:    Bed Mobility:    · Patient completed Scooting/Bridging with moderate assistance  · Patient completed Supine to Sit with maximal assistance    Functional Mobility/Transfers:  · Patient completed Bed <> Chair Transfer using Scoot Pivot technique with maximal assistance with w/c  · Functional Mobility: Pt propelled self ~300 ft in w/c w/ no verbal cues for navigation and avoidance of obstacles.      Cognitive/Visual Perceptual:  Cognitive/Psychosocial Skills:     -       Oriented to: Person, Place and Time   -       Follows Commands/attention:Follows multistep  commands  -       Communication: clear/fluent    Physical Exam:  Upper Extremity Range of Motion:     -       Right Upper Extremity: WFL  -       Left Upper Extremity: WFL  Upper Extremity Strength:    -       Right Upper Extremity: WFL  -       Left Upper Extremity: WFL   Strength:    -       Right Upper Extremity: WFL  -       Left Upper Extremity: WFL  Fine Motor Coordination:    -       Intact    AMPAC 6 Click ADL:  AMPAC Total Score:      Treatment & Education:  - OT role/POC  - importance of OOB activity to maximize recovery   - Safety w/ functional mobility; hand placement to ensure safe transfers  - Educated pt on UE strengthening exercises w/ resistive band to maintain B UE strength and endurance   Education:    Patient left up in chair with all lines intact, call button in reach and RN notified    GOALS:   Multidisciplinary Problems     Occupational Therapy Goals         Problem: Occupational Therapy Goal    Goal Priority Disciplines Outcome Interventions   Occupational Therapy Goal     OT, PT/OT Ongoing (interventions implemented as appropriate)    Description:  Eval performed on 4/14/2019  Goals to be met by: 4/21/2019     Patient will increase functional independence with ADLs by performing:    UE Dressing with Contact Guard Assistance.  LE Dressing with Moderate Assistance.  Grooming while sitting up in w/c with Modified [place].  Toileting from bedside commode with Minimal Assistance for hygiene and clothing management.   Supine to sit with Contact Guard Assistance.  Toilet transfer to bedside commode via scooting technique with Moderate Assistance.                      History:     Past Medical History:   Diagnosis Date    Allergy     Arthritis     Hypertension     Neuropathy        Past Surgical History:   Procedure Laterality Date    ABDOMINAL SURGERY      AMPUTATION      right foot 5th digit    AMPUTATION, ABOVE KNEE Right 4/11/2019    Performed by Thalia Moreno MD at [facility] OR 2ND FLR    AMPUTATION, ABOVE KNEE Left 3/20/2019    Performed by Eligio Olmos MD at [facility] OR 2ND [facility]LR    APPLICATION, WOUND VAC Left 4/11/2019    Performed by Thalia Moreno MD at [facility] OR 2ND [facility]LR    COLONOSCOPY  04/23/2018    COLONOSCOPY N/A 4/23/2018    Performed by [facility]amy [facility]elan [facility] at [facility]    DEBRIDEMENT, WOUND Left 4/11/2019    Performed by Thalia [facility]no MD at [facility] OR 2ND [facility]LR    ESOPHAGOGASTRODUODENOSCOPY  04/23/2018    ESOPHAGOGASTRODUODENOSCOPY (EGD) N/A 4/23/2018    Performed by [facility]an [facility] at [facility]    HIP SURGERY Bilateral     x2       Time Tracking:     OT Date of Treatment: 04/14/19  OT Start Time: 1133  OT Stop Time: 1202  OT Total Time (min): 29 min    Billable Minutes:Evaluation 19  Therapeutic Exercise 10    Mandy Sanders, OT  4/15/2019

## 2019-04-15 NOTE — ADDENDUM NOTE
Addendum  created 04/15/19 0947 by Antionette Lozoya RN    LDA properties accepted, Order list changed

## 2019-04-15 NOTE — ANESTHESIA POST-OP PAIN MANAGEMENT
Acute Pain Service Progress Note    Pranay Colindres is a 59 y.o., male, [id number].    Surgery:  Pre-op Diagnosis: Critical lower limb ischemia [I99.8], POD#4 s/p R AKA, lkeft lower extremity wound debridement, application of wound vac to left lower extremity.    Procedure(s):  AMPUTATION, ABOVE KNEE left hip disarticulation         Post Op Day #: 4    Catheter type: perineural  femoral    Infusion type: Ropivacaine 0.2%  8 basal    Problem List:    Active Hospital Problems    Diagnosis  POA    *Critical lower limb ischemia [I99.8]  Yes      Resolved Hospital Problems   No resolved problems to display.       Subjective:     General appearance of alert, oriented, no complaints   Pain with rest: 3    Numbers   Pain with movement: 3    Numbers   Side Effects    1. Pruritis No    2. Nausea No    3. Motor Blockade No, 0=Ability to raise lower extremities off bed    4. Sedation No, 1=awake and alert    Objective:       Catheter site clean, dry, intact        Vitals   Vitals:    04/15/19 0745   BP: 133/88   Pulse: 96   Resp: 17   Temp: 36.8 °C (98.2 °F)        Labs    Admission on 04/10/2019   No results displayed because visit has over 200 results.           Meds   Current Facility-Administered Medications   Medication Dose Route Frequency Provider Last Rate Last Dose    celecoxib capsule 200 mg  200 mg Oral Daily Ignacio Nguyen MD   200 mg at 04/13/19 0857    ciprofloxacin (CIPRO)400mg/200ml D5W IVPB 400 mg  400 mg Intravenous Q12H Jamir Braun  mL/hr at 04/15/19 0522 400 mg at 04/15/19 0522    HYDROmorphone injection 0.5 mg  0.5 mg Intravenous Once Hugo Bansal MD        lactated ringers infusion   Intravenous Continuous Jamir Braun  mL/hr at 04/13/19 1133      magnesium oxide tablet 800 mg  800 mg Oral Once Denisha Chambers MD        metoprolol succinate (TOPROL-XL) 24 hr tablet 50 mg  50 mg Oral Daily Camacho Pete MD   50 mg at 04/13/19 0857    ondansetron injection 4  mg  4 mg Intravenous Q6H PRN Denisha Chambers MD        ondansetron injection 4 mg  4 mg Intravenous Q12H PRN Ignacio Nguyen MD        oxyCODONE immediate release tablet 5 mg  5 mg Oral Q4H PRN Denisha Chambers MD   5 mg at 04/13/19 1821    oxyCODONE immediate release tablet Tab 10 mg  10 mg Oral Q4H PRN Denisha Chambers MD   10 mg at 04/14/19 2226    pregabalin capsule 150 mg  150 mg Oral QHS Ignacio Nguyen MD   150 mg at 04/14/19 2115    promethazine (PHENERGAN) 6.25 mg in dextrose 5 % 50 mL IVPB  6.25 mg Intravenous Q6H PRN Denisha Chambers MD        ropivacaine (PF) 2 mg/ml (0.2%) infusion  8 mL/hr Perineural Continuous Ignacio Nguyen MD 8 mL/hr at 04/15/19 0134 8 mL/hr at 04/15/19 0134    sodium chloride 0.9% flush 10 mL  10 mL Intravenous PRN James Montero MD        sodium chloride 0.9% flush 3 mL  3 mL Intravenous PRN Denisha Chambers MD        vancomycin in dextrose 5 % 1 gram/250 mL IVPB 1,000 mg  1,000 mg Intravenous On Call Procedure Denisha Chambers MD   1,000 mg at 04/11/19 1042    zolpidem tablet 5 mg  5 mg Oral Nightly PRN Isaías Ronquillo MD   5 mg at 04/14/19 2226       Assessment:     Pain control adequate    Plan:    Discontinued perineural catheter due to positive wound culture (p mirabilis); on ciprofloxacin. Patient on schedule for hip disarticulation on 4/16. Continue to monitor.     Evaluator Oliverio Wilson

## 2019-04-15 NOTE — PLAN OF CARE
Problem: Occupational Therapy Goal  Goal: Occupational Therapy Goal  Eval performed on 4/14/2019  Goals to be met by: 4/21/2019     Patient will increase functional independence with ADLs by performing:    UE Dressing with Contact Guard Assistance.  LE Dressing with Moderate Assistance.  Grooming while sitting up in w/c with Modified [place].  Toileting from bedside commode with Minimal Assistance for hygiene and clothing management.   Supine to sit with Contact Guard Assistance.  Toilet transfer to bedside commode via scooting technique with Moderate Assistance.    Outcome: Ongoing (interventions implemented as appropriate)  OT eval complete. Pt tolerated session well. Pt's functional outcomes established today based on his presenting functional level.     Comments: Cont OT POC

## 2019-04-15 NOTE — SUBJECTIVE & OBJECTIVE
Medications:  Continuous Infusions:   lactated ringers 100 mL/hr at 04/13/19 1133    ropivacaine (PF) 2 mg/ml (0.2%) 8 mL/hr (04/15/19 0134)     Scheduled Meds:   ceFAZolin (ANCEF) IVPB  1 g Intravenous Q8H    celecoxib  200 mg Oral Daily    ciprofloxacin  400 mg Intravenous Q12H    HYDROmorphone  0.5 mg Intravenous Once    metoprolol succinate  50 mg Oral Daily    metronidazole  500 mg Intravenous Q8H    pregabalin  150 mg Oral QHS     PRN Meds:ondansetron, ondansetron, oxyCODONE, oxyCODONE, promethazine (PHENERGAN) IVPB, sodium chloride 0.9%, sodium chloride 0.9%, vancomycin (VANCOCIN) IVPB, zolpidem     Objective:     Vital Signs (Most Recent):  Temp: 98.4 °F (36.9 °C) (04/14/19 2330)  Pulse: 94 (04/14/19 2330)  Resp: 18 (04/14/19 2330)  BP: (!) 142/90 (04/14/19 2330)  SpO2: 98 % (04/14/19 2330) Vital Signs (24h Range):  Temp:  [98.1 °F (36.7 °C)-98.4 °F (36.9 °C)] 98.4 °F (36.9 °C)  Pulse:  [90-94] 94  Resp:  [18] 18  SpO2:  [98 %] 98 %  BP: (142-143)/(80-90) 142/90         Physical Exam   Constitutional: He is oriented to person, place, and time. He appears well-developed and well-nourished.   Neck: No JVD present. No tracheal deviation present.   Cardiovascular: Normal rate and regular rhythm.   Pulmonary/Chest: Effort normal. No respiratory distress.   Abdominal: Soft. He exhibits no distension. There is no tenderness. There is no guarding.   Musculoskeletal: He exhibits no edema.   L AKA with healthy red, perfusing tissue, no purulence or necrotic tissue noted, wound vac changed 04/13  R AKA incision c/d/i, mepore dressing reapplied   Neurological: He is alert and oriented to person, place, and time.   Skin: Skin is warm and dry.   Nursing note and vitals reviewed.      Significant Labs:  CBC:   Recent Labs   Lab 04/15/19  0350   WBC 13.86*   RBC 3.68*   HGB 10.2*   HCT 28.2*   *   MCV 77*   MCH 27.7   MCHC 36.2*     CMP:   Recent Labs   Lab 04/15/19  0350   GLU 94   CALCIUM 9.1   ALBUMIN  2.3*   PROT 6.3      K 4.2   CO2 25      BUN 7   CREATININE 0.6   ALKPHOS 101   ALT 16   AST 77*   BILITOT 0.2       Significant Diagnostics:  I have reviewed all pertinent imaging results/findings within the past 24 hours.

## 2019-04-15 NOTE — PT/OT/SLP EVAL
Physical Therapy  Evaluation and Treatment    Pranay Colindres   [id number]    Time Tracking:     PT Received On: 04/14/19   PT Start Time: 1120   PT Stop Time: 1145   PT Total Time (min): 25 min    Billable Minutes: Evaluation 25      Recommendations:     Discharge recommendations: Skilled Nursing Facility (long-term); return to previous NH with SNF component     Equipment recommendations: Bedside Commode (has wheelchair, electric scooter, standard walker, shower chair)    Barriers to Discharge: None    Patient Information:     Recent Surgery: s/p (R) AKA on 4/11/19; previous (L) AKA infected with wound vac placement    Diagnosis: Critical lower limb ischemia    General Precautions: Standard, fall    Orthopedic Precautions: BLE NWB; bilateral AKA    Assessment:     Pranay Colindres is a 59 y.o. male [place] [facility] [place] Critical lower limb ischemia; underwent (R) AKA on 4/11 with wound vac placement to previous (L) AKA due to infection. Pranay Colindres tolerated evaluation well today. He was in good spirits, amenable to mobilizing. Requires mod-max (A) to transition from supine to sitting, max (A) to scoot pivot from bed into wheelchair due to weakness and frequent posterior LOB. Once in wheelchair, he was able to propel himself 300 ft in hallways using his (B)UE, navigates obstacles and turns without difficulty. Up in wheelchair participating in UE theraband exercise with OT upon my leaving. Checked on patient in early PM at ~1400, nsg had safely assisted back to bed; pt in good spirits, comfortable in bed. Discussed PT role, POC, goals and recommendations with patient and/or family; verbalized understanding. Pranay Colindres would benefit from acute PT services to promote mobility during this admission and improve return to PLOF.    Problem List: weakness, decreased endurance, impaired self-care skills, impaired mobility, decreased sitting or standing balance, orthopedic and/or sternal precautions and  pain    Rehab Prognosis: Fair; patient would benefit from acute skilled PT services to address these deficits and reach maximum level of function.    Plan:     Patient to be seen 3 x/week to address the above listed problems via therapeutic activities, therapeutic exercises, neuromuscular re-education, wheelchair management/training    Plan of Care Expires: 05/12/19  Plan of Care reviewed with: patient    Subjective:     Communicated with RN prior to evaluation, appropriate to see for evaluation.    Pt found supine in bed (HOB elevated) upon PT entry to room, agreeable to evaluation.    Does this patient have any cultural, spiritual, Buddhism conflicts given the current situation? Patient has no barriers to learning. Patient verbalizes understanding of his/her program and goals and demonstrates them correctly. No cultural, spiritual, or educational needs identified.    Past Medical History:   Diagnosis Date    Allergy     Arthritis     Hypertension     Neuropathy      Past Surgical History:   Procedure Laterality Date    ABDOMINAL SURGERY      AMPUTATION      right foot 5th digit    AMPUTATION, ABOVE KNEE Right 4/11/2019    Performed by [facility] at [facility] OR 2ND FLR    AMPUTATION, ABOVE KNEE Left 3/20/2019    Performed by [facility]ligio Olmos [facility] at [facility] OR 2ND FLR    APPLICATION, WOUND VAC Left 4/11/2019    Performed by [facility] at [facility] OR 2ND FLR    COLONOSCOPY  04/23/2018    COLONOSCOPY N/A 4/23/2018    Performed by [facility]an [facility] at [facility] ENDO    DEBRIDEMENT, WOUND Left 4/11/2019    Performed by [facility] at [facility] OR 2ND FLR    ESOPHAGOGASTRODUODENOSCOPY  04/23/2018    ESOPHAGOGASTRODUODENOSCOPY (EGD) N/A 4/23/2018    Performed by [facility]an MD at [facility] ENDO    HIP SURGERY Bilateral     x2     Living Environment:  Prior to this admit, pt was a NH resident but states he was getting 3 hours of PT/OT each day. Prior to NH, he was living at home with his  brother in a 1 SH with ramp access.    PLOF:  Prior to admission, patient was primarily wheelchair bound due to (R) AKA but doing some standing work with PT/OT at NH. Able to propel his own wheelchair, requiring assist for ADL's.    DME:  Patient owns or has access to the following DME: Shower Chair, Wheelchair, Standard Walker and electric scooter    Upon discharge, patient will have assistance from NH facility.    Objective:     Patient found with: perineural catheter, peripheral IV, wound vac to LLE    Pain:  Pain Rating 1: did not rate but does wince in pain during mobility; otherwise pleasant and talkative when resting  Pain Rating Post-Intervention 1: did not rate, pleasant in chair    Cognitive Exam:  Patient is oriented to Person, Place, Time and Situation.  Patient follows 100% of single-step commands.    Sensation:   Intact to residual limbs    Lower Extremity Range of Motion:  Right Lower Extremity: limited hip ext s/p AKA  Left Lower Extremity: limited hip ext s/p AKA    Functional Mobility:    · Bed Mobility:  · Supine to Sitting: max (A)  · Scooting towards EOB in sitting: max (A), frequent LOB posteriorly when scooting    · Transfers:  · Bed to Chair: max (A) from bed to chair with no AD via therapist led scoot pivot x 1 trial    · Gait:  · NT 2* (B) AKA    · Balance:  · Static Sit: Max Assist at EOB, losing balance posteriorly for all scooting and EOB sitting    · Wheelchair Propulsion:  · Pt propelled Standard wheelchair x 300 feet on Level tile with  Bilateral upper extremity with [place].    Additional Therapeutic Activity/Exercises:     1. Discussed PT role, POC, goals and recommendations with patient and/or family; verbalized understanding.    2. Up in wheelchair participating in UE theraband exercise with OT upon my leaving. Checked on patient in early PM at ~1400, nsg had safely assisted back to bed; pt in good spirits, comfortable in bed.    Patient was left sitting up in wheelchair with  all lines intact, call button in reach and OT present.    Clinical Decision Making for Evaluation Complexity:  1. Body System(s) Examination: 3  2. Clinical Presentation: Evolving  3. Evaluation Complexity: Moderate    GOALS:   Multidisciplinary Problems     Physical Therapy Goals        Problem: Physical Therapy Goal    Goal Priority Disciplines Outcome Goal Variances Interventions   Physical Therapy Goal     PT, PT/OT      Description:  Goals to be met by: 19     Patient will increase functional independence with mobility by performin. Supine to sit with Contact Guard Assistance - Not met  2. Sit to supine with Contact Guard Assistance - Not met  3. Bed to chair transfer with Stand-by Assistance via scoot pivot into wheelchair - Not met  4. Wheelchair propulsion x 500 feet with [place] using bilateral upper extremities - Not met  5. Sitting at edge of bed x 5 minutes with Contact Guard Assistance - Not met  6. Lower extremity exercise program x 10 reps per handout, with supervision - Not met                    Delvin Hollis, PT  4/15/2019

## 2019-04-15 NOTE — ASSESSMENT & PLAN NOTE
59 y.o. male with h/o HTN, arthritis, and cerebral palsy, tobacco abuse, PVD with previous aorto-bifem and L fem/pop. S/p emergent L AKA 3/20/19. Presents with fat necrosis L AKA stump and ischemic R foot rest pain. 4 Days Post-Op from L AKA washout/debridement with wound vac placement and Right AKA on 4/11/19. Left AKA with tracking muscle necrosis and purulence.    - Cardiac diet, NPO at midnight  - mIVF at 100cc/hr  - Ropivicaine pain catheter remain in place, appreciate anesthesia assistance  - Bedside wound vac change (04/13/19)  - L AKA wound cx grew Proteus Mirabilus: continue IV cipro  - pain control prn  - PT/OT  - Pulmonary toilet    Dispo: On schedule for hip disarticulation tomorrow, 4/16. Will obtain consent today.

## 2019-04-15 NOTE — ANESTHESIA PREPROCEDURE EVALUATION
[facility]  Anesthesia Pre-Operative Evaluation         Patient Name: Pranay Colindres  YOB: 1960  MRN: [id number]    SUBJECTIVE:     Pre-operative evaluation for Procedure(s) (LRB):  AMPUTATION, ABOVE KNEE left hip disarticulation (Left)     04/15/2019    Pranay Colindres is a 59 y.o. male w/ a significant PMHx of Cerebral palsy, HTN, PVD with previous aorto-bifem and L fem/pop S/p emergent L AKA 3/20/19 for critical limb ischemia. [place] [facility] [place] fat necrosis L AKA stump and ischemic R foot rest pain. 4 Days Post-Op from L AKA washout/debridement with wound vac placement and Right AKA on 4/11/19. Left AKA with tracking muscle necrosis and purulence. Cultures from left AKA growing Proteus Mirabilis currently on Ciprofloxacin IV.        Patient now presents for the above procedure(s).      LDA: None documented.       Prev airway: Easy mask; Abrams 2 Grade I view.     Drips: None documented.   lactated ringers 100 mL/hr at 04/13/19 1133    ropivacaine (PF) 2 mg/ml (0.2%) 8 mL/hr (04/15/19 0134)       Patient Active Problem List   Diagnosis    Encounter for screening colonoscopy    Foot pain, right    Neuritis    Critical lower limb ischemia    Other cerebral palsy    Impaired mobility and activities of daily living    PVD (peripheral vascular disease)       Review of patient's allergies indicates:  No Known Allergies    Current Inpatient Medications:   celecoxib  200 mg Oral Daily    ciprofloxacin  400 mg Intravenous Q12H    HYDROmorphone  0.5 mg Intravenous Once    magnesium oxide  800 mg Oral Once    metoprolol succinate  50 mg Oral Daily    pregabalin  150 mg Oral QHS       No current facility-administered medications on file prior to encounter.      Current Outpatient Medications on File Prior to Encounter   Medication Sig Dispense Refill     amLODIPine (NORVASC) 10 MG tablet Take 1 tablet (10 mg total) by mouth once daily. 30 tablet 3    apixaban (ELIQUIS) 2.5 mg Tab Take 1 tablet (2.5 mg total) by mouth 2 (two) times daily. 60 tablet 3    aspirin (ECOTRIN) 81 MG EC tablet Take 1 tablet (81 mg total) by mouth once daily. 30 tablet 11    atorvastatin (LIPITOR) 40 MG tablet Take 1 tablet (40 mg total) by mouth once daily. 90 tablet 3    latanoprost 0.005 % ophthalmic solution       metoprolol succinate (TOPROL-XL) 50 MG 24 hr tablet Take 50 mg by mouth once daily.      pantoprazole (PROTONIX) 40 MG tablet       senna (SENOKOT) 8.6 mg tablet Take 1 tablet by mouth once daily.      gabapentin (NEURONTIN) 300 MG capsule Take 1 capsule (300 mg total) by mouth every evening. 30 capsule 6    HYDROcodone-acetaminophen (NORCO) 7.5-325 mg per tablet Take 1 tablet by mouth every 4 (four) hours as needed for Pain. 20 tablet 0    traMADol (ULTRAM) 50 mg tablet Take 1 tablet (50 mg total) by mouth every 6 (six) hours as needed for Pain. 3 tablet 0       Past Surgical History:   Procedure Laterality Date    ABDOMINAL SURGERY      AMPUTATION      right foot 5th digit    AMPUTATION, ABOVE KNEE Right 4/11/2019    Performed by [facility] at [facility] OR 2ND FLR    AMPUTATION, ABOVE KNEE Left 3/20/2019    Performed by Eligio lOmos [facility] at [facility] OR 2ND FLR    APPLICATION, WOUND VAC Left 4/11/2019    Performed by [facility] at [facility] OR 2ND FLR    COLONOSCOPY  04/23/2018    COLONOSCOPY N/A 4/23/2018    [facility] at [facility] ENDO    DEBRIDEMENT, WOUND Left 4/11/2019    Performed by [facility] MD at [facility] OR 2ND FLR    ESOPHAGOGASTRODUODENOSCOPY  04/23/2018    ESOPHAGOGASTRODUODENOSCOPY (EGD) N/A 4/23/2018    Performed by [facility] at [facility] ENDO    HIP SURGERY Bilateral     x2       Social History     Socioeconomic History    Marital status: Single     Spouse name: Not on file    Number of children:  Not on file    Years of education: Not on file    Highest education level: Not on file   Occupational History    Not on file   Social Needs    Financial resource strain: Not on file    Food insecurity:     Worry: Not on file     Inability: Not on file    Transportation needs:     Medical: Not on file     Non-medical: Not on file   Tobacco Use    Smoking status: Former Smoker     Types: Cigarettes     Last attempt to quit: 3/12/2019     Years since quittin.0    Smokeless tobacco: Never Used   Substance and Sexual Activity    Alcohol use: Yes     Comment: 1-2    Drug use: No    Sexual activity: Not on file   Lifestyle    Physical activity:     Days per week: Not on file     Minutes per session: Not on file    Stress: Not on file   Relationships    Social connections:     Talks on phone: Not on file     Gets together: Not on file     Attends Mosque service: Not on file     Active member of club or organization: Not on file     Attends meetings of clubs or organizations: Not on file     Relationship status: Not on file   Other Topics Concern    Not on file   Social History Narrative    Not on file       OBJECTIVE:     Vital Signs Range (Last 24H):  Temp:  [36.7 °C (98.1 °F)-36.9 °C (98.4 °F)]   Pulse:  [90-94]   Resp:  [18]   BP: (142-143)/(80-90)   SpO2:  [98 %]       Significant Labs:  Lab Results   Component Value Date    WBC 13.86 (H) 04/15/2019    HGB 10.2 (L) 04/15/2019    HCT 28.2 (L) 04/15/2019     (H) 04/15/2019    CHOL 156 2019    TRIG 69 2019    HDL 36 (L) 2019    ALT 16 04/15/2019    AST 77 (H) 04/15/2019     04/15/2019    K 4.2 04/15/2019     04/15/2019    CREATININE 0.6 04/15/2019    BUN 7 04/15/2019    CO2 25 04/15/2019    INR 1.0 04/10/2019       Diagnostic Studies: No relevant studies.    EKG:   Results for orders placed or performed during the hospital encounter of 04/10/19   EKG, 12 - Lead    Collection Time: 04/10/19  3:21 PM    Narrative     Test Reason : Z01.810,    Vent. Rate : 118 BPM     Atrial Rate : 118 BPM     P-R Int : 112 ms          QRS Dur : 066 ms      QT Int : 334 ms       P-R-T Axes : 067 016 001 degrees     QTc Int : 468 ms    Sinus tachycardia  Otherwise normal ECG  When compared with ECG of 20-APR-2018 10:40,  Vent. rate has increased BY  62 BPM    Confirmed by ANEUDY LEMA MD ([id number]) on 4/11/2019 1:38:50 PM    Referred By: ARMAND ADAMES           Confirmed By:ANEUDY LEMA MD       2D ECHO:  No results found for this or any previous visit.      ASSESSMENT/PLAN:       Anesthesia Evaluation    I have reviewed the Patient Summary Reports.    I have reviewed the Nursing Notes.   I have reviewed the Medications.     Review of Systems  Anesthesia Hx:  History of prior surgery of interest to airway management or planning:  Denies Personal Hx of Anesthesia complications.   Social:  Former Smoker, Alcohol Use    Hematology/Oncology:     Oncology Normal    -- Anemia: Hematology Comments: 10.2/28    EENT/Dental:EENT/Dental Normal   Cardiovascular:   Hypertension Denies CABG/stent.  Denies Dysrhythmias.  PVD    Pulmonary:  Pulmonary Normal    Renal/:  Renal/ Normal     Hepatic/GI:  Hepatic/GI Normal    Neurological:   Cerebral palsy    Endocrine:  Endocrine Normal    Psych:  Psychiatric Normal           Physical Exam  General:  Well nourished    Airway/Jaw/Neck:  Airway Findings: Mouth Opening: Normal Tongue: Large  General Airway Assessment: Adult  Mallampati: II  Improves to I with phonation.  TM Distance: Normal, at least 6 cm      Dental:  Dental Findings: Periodontal disease, Severe        Mental Status:  Mental Status Findings:  Cooperative, Alert and Oriented         Anesthesia Plan  Type of Anesthesia, risks & benefits discussed:  Anesthesia Type:  general, MAC  Patient's Preference:   Intra-op Monitoring Plan: standard ASA monitors  Intra-op Monitoring Plan Comments:   Post Op Pain Control Plan: per primary service following discharge from  PACU, IV/PO Opioids PRN and multimodal analgesia  Post Op Pain Control Plan Comments:   Induction:   IV  Beta Blocker:  Patient is on a Beta-Blocker and has received one dose within the past 24 hours (No further documentation required).       Informed Consent: Patient understands risks and agrees with Anesthesia plan.  Questions answered. Anesthesia consent signed with patient.  ASA Score: 3     Day of Surgery Review of History & Physical:            Ready For Surgery From Anesthesia Perspective.

## 2019-04-16 ENCOUNTER — ANESTHESIA (OUTPATIENT)
Dept: SURGERY | Facility: HOSPITAL | Age: 59
DRG: 240 | End: 2019-04-16
Payer: MEDICARE

## 2019-04-16 LAB
ABO + RH BLD: NORMAL
ALBUMIN SERPL BCP-MCNC: 2.5 G/DL (ref 3.5–5.2)
ALP SERPL-CCNC: 105 U/L (ref 55–135)
ALT SERPL W/O P-5'-P-CCNC: 16 U/L (ref 10–44)
ANION GAP SERPL CALC-SCNC: 7 MMOL/L (ref 8–16)
AST SERPL-CCNC: 70 U/L (ref 10–40)
BACTERIA SPEC ANAEROBE CULT: NORMAL
BACTERIA SPEC ANAEROBE CULT: NORMAL
BASOPHILS # BLD AUTO: 0.05 K/UL (ref 0–0.2)
BASOPHILS # BLD AUTO: 0.06 K/UL (ref 0–0.2)
BASOPHILS NFR BLD: 0.4 % (ref 0–1.9)
BASOPHILS NFR BLD: 0.4 % (ref 0–1.9)
BILIRUB SERPL-MCNC: 0.2 MG/DL (ref 0.1–1)
BLD GP AB SCN CELLS X3 SERPL QL: NORMAL
BUN SERPL-MCNC: 7 MG/DL (ref 6–20)
CALCIUM SERPL-MCNC: 9.4 MG/DL (ref 8.7–10.5)
CHLORIDE SERPL-SCNC: 100 MMOL/L (ref 95–110)
CO2 SERPL-SCNC: 30 MMOL/L (ref 23–29)
CREAT SERPL-MCNC: 0.6 MG/DL (ref 0.5–1.4)
DIFFERENTIAL METHOD: ABNORMAL
DIFFERENTIAL METHOD: ABNORMAL
EOSINOPHIL # BLD AUTO: 0.4 K/UL (ref 0–0.5)
EOSINOPHIL # BLD AUTO: 0.5 K/UL (ref 0–0.5)
EOSINOPHIL NFR BLD: 2.9 % (ref 0–8)
EOSINOPHIL NFR BLD: 4.5 % (ref 0–8)
ERYTHROCYTE [DISTWIDTH] IN BLOOD BY AUTOMATED COUNT: 13.9 % (ref 11.5–14.5)
ERYTHROCYTE [DISTWIDTH] IN BLOOD BY AUTOMATED COUNT: 14.1 % (ref 11.5–14.5)
EST. GFR  (AFRICAN AMERICAN): >60 ML/MIN/1.73 M^2
EST. GFR  (NON AFRICAN AMERICAN): >60 ML/MIN/1.73 M^2
GLUCOSE SERPL-MCNC: 107 MG/DL (ref 70–110)
HCT VFR BLD AUTO: 27.1 % (ref 40–54)
HCT VFR BLD AUTO: 27.4 % (ref 40–54)
HGB BLD-MCNC: 9.5 G/DL (ref 14–18)
HGB BLD-MCNC: 9.5 G/DL (ref 14–18)
IMM GRANULOCYTES # BLD AUTO: 0.16 K/UL (ref 0–0.04)
IMM GRANULOCYTES # BLD AUTO: 0.18 K/UL (ref 0–0.04)
IMM GRANULOCYTES NFR BLD AUTO: 1.2 % (ref 0–0.5)
IMM GRANULOCYTES NFR BLD AUTO: 1.4 % (ref 0–0.5)
LYMPHOCYTES # BLD AUTO: 2 K/UL (ref 1–4.8)
LYMPHOCYTES # BLD AUTO: 2.3 K/UL (ref 1–4.8)
LYMPHOCYTES NFR BLD: 15.8 % (ref 18–48)
LYMPHOCYTES NFR BLD: 17.9 % (ref 18–48)
MAGNESIUM SERPL-MCNC: 1.7 MG/DL (ref 1.6–2.6)
MCH RBC QN AUTO: 26.9 PG (ref 27–31)
MCH RBC QN AUTO: 27.3 PG (ref 27–31)
MCHC RBC AUTO-ENTMCNC: 34.7 G/DL (ref 32–36)
MCHC RBC AUTO-ENTMCNC: 35.1 G/DL (ref 32–36)
MCV RBC AUTO: 77 FL (ref 82–98)
MCV RBC AUTO: 79 FL (ref 82–98)
MONOCYTES # BLD AUTO: 0.9 K/UL (ref 0.3–1)
MONOCYTES # BLD AUTO: 0.9 K/UL (ref 0.3–1)
MONOCYTES NFR BLD: 5.9 % (ref 4–15)
MONOCYTES NFR BLD: 7.6 % (ref 4–15)
NEUTROPHILS # BLD AUTO: 10.9 K/UL (ref 1.8–7.7)
NEUTROPHILS # BLD AUTO: 7.8 K/UL (ref 1.8–7.7)
NEUTROPHILS NFR BLD: 68.2 % (ref 38–73)
NEUTROPHILS NFR BLD: 73.8 % (ref 38–73)
NRBC BLD-RTO: 0 /100 WBC
NRBC BLD-RTO: 0 /100 WBC
PHOSPHATE SERPL-MCNC: 3.7 MG/DL (ref 2.7–4.5)
PLATELET # BLD AUTO: 545 K/UL (ref 150–350)
PLATELET # BLD AUTO: 547 K/UL (ref 150–350)
PMV BLD AUTO: 9.5 FL (ref 9.2–12.9)
PMV BLD AUTO: 9.6 FL (ref 9.2–12.9)
POTASSIUM SERPL-SCNC: 4.3 MMOL/L (ref 3.5–5.1)
PROT SERPL-MCNC: 6.8 G/DL (ref 6–8.4)
RBC # BLD AUTO: 3.48 M/UL (ref 4.6–6.2)
RBC # BLD AUTO: 3.53 M/UL (ref 4.6–6.2)
SODIUM SERPL-SCNC: 137 MMOL/L (ref 136–145)
WBC # BLD AUTO: 11.41 K/UL (ref 3.9–12.7)
WBC # BLD AUTO: 14.72 K/UL (ref 3.9–12.7)

## 2019-04-16 PROCEDURE — 27596 PR AMPUTATE THIGH,RE-AMPUTATN: ICD-10-PCS | Mod: 58,LT,, | Performed by: SURGERY

## 2019-04-16 PROCEDURE — 25000003 PHARM REV CODE 250: Performed by: STUDENT IN AN ORGANIZED HEALTH CARE EDUCATION/TRAINING PROGRAM

## 2019-04-16 PROCEDURE — 25000003 PHARM REV CODE 250: Performed by: SURGERY

## 2019-04-16 PROCEDURE — 88305 TISSUE EXAM BY PATHOLOGIST: CPT | Performed by: PATHOLOGY

## 2019-04-16 PROCEDURE — 37000008 HC ANESTHESIA 1ST 15 MINUTES: Performed by: SURGERY

## 2019-04-16 PROCEDURE — 36000711: Performed by: SURGERY

## 2019-04-16 PROCEDURE — D9220A PRA ANESTHESIA: ICD-10-PCS | Mod: CRNA,,, | Performed by: NURSE ANESTHETIST, CERTIFIED REGISTERED

## 2019-04-16 PROCEDURE — 36000710: Performed by: SURGERY

## 2019-04-16 PROCEDURE — 63600175 PHARM REV CODE 636 W HCPCS: Performed by: NURSE ANESTHETIST, CERTIFIED REGISTERED

## 2019-04-16 PROCEDURE — 71000033 HC RECOVERY, INTIAL HOUR: Performed by: SURGERY

## 2019-04-16 PROCEDURE — 80053 COMPREHEN METABOLIC PANEL: CPT

## 2019-04-16 PROCEDURE — D9220A PRA ANESTHESIA: ICD-10-PCS | Mod: ANES,,, | Performed by: ANESTHESIOLOGY

## 2019-04-16 PROCEDURE — 63600175 PHARM REV CODE 636 W HCPCS: Performed by: ANESTHESIOLOGY

## 2019-04-16 PROCEDURE — 11000001 HC ACUTE MED/SURG PRIVATE ROOM

## 2019-04-16 PROCEDURE — 85025 COMPLETE CBC W/AUTO DIFF WBC: CPT

## 2019-04-16 PROCEDURE — 71000039 HC RECOVERY, EACH ADD'L HOUR: Performed by: SURGERY

## 2019-04-16 PROCEDURE — 88305 TISSUE SPECIMEN TO PATHOLOGY - SURGERY: ICD-10-PCS | Mod: 26,,, | Performed by: PATHOLOGY

## 2019-04-16 PROCEDURE — 88305 TISSUE EXAM BY PATHOLOGIST: CPT | Mod: 26,,, | Performed by: PATHOLOGY

## 2019-04-16 PROCEDURE — 88311 TISSUE SPECIMEN TO PATHOLOGY - SURGERY: ICD-10-PCS | Mod: 26,,, | Performed by: PATHOLOGY

## 2019-04-16 PROCEDURE — D9220A PRA ANESTHESIA: Mod: CRNA,,, | Performed by: NURSE ANESTHETIST, CERTIFIED REGISTERED

## 2019-04-16 PROCEDURE — 84100 ASSAY OF PHOSPHORUS: CPT

## 2019-04-16 PROCEDURE — 37000009 HC ANESTHESIA EA ADD 15 MINS: Performed by: SURGERY

## 2019-04-16 PROCEDURE — 83735 ASSAY OF MAGNESIUM: CPT

## 2019-04-16 PROCEDURE — 27000221 HC OXYGEN, UP TO 24 HOURS

## 2019-04-16 PROCEDURE — 27596 AMPUTATION FOLLOW-UP SURGERY: CPT | Mod: 58,LT,, | Performed by: SURGERY

## 2019-04-16 PROCEDURE — D9220A PRA ANESTHESIA: Mod: ANES,,, | Performed by: ANESTHESIOLOGY

## 2019-04-16 PROCEDURE — 86850 RBC ANTIBODY SCREEN: CPT

## 2019-04-16 PROCEDURE — 63600175 PHARM REV CODE 636 W HCPCS: Performed by: SURGERY

## 2019-04-16 PROCEDURE — 36415 COLL VENOUS BLD VENIPUNCTURE: CPT

## 2019-04-16 PROCEDURE — 25000003 PHARM REV CODE 250: Performed by: NURSE ANESTHETIST, CERTIFIED REGISTERED

## 2019-04-16 PROCEDURE — 94761 N-INVAS EAR/PLS OXIMETRY MLT: CPT

## 2019-04-16 PROCEDURE — 88311 DECALCIFY TISSUE: CPT | Mod: 26,,, | Performed by: PATHOLOGY

## 2019-04-16 RX ORDER — MIDAZOLAM HYDROCHLORIDE 1 MG/ML
INJECTION, SOLUTION INTRAMUSCULAR; INTRAVENOUS
Status: DISCONTINUED | OUTPATIENT
Start: 2019-04-16 | End: 2019-04-16

## 2019-04-16 RX ORDER — CEFAZOLIN SODIUM 1 G/3ML
INJECTION, POWDER, FOR SOLUTION INTRAMUSCULAR; INTRAVENOUS
Status: DISCONTINUED | OUTPATIENT
Start: 2019-04-16 | End: 2019-04-16

## 2019-04-16 RX ORDER — MEPERIDINE HYDROCHLORIDE 50 MG/ML
12.5 INJECTION INTRAMUSCULAR; INTRAVENOUS; SUBCUTANEOUS ONCE AS NEEDED
Status: DISCONTINUED | OUTPATIENT
Start: 2019-04-16 | End: 2019-04-16 | Stop reason: HOSPADM

## 2019-04-16 RX ORDER — FENTANYL CITRATE 50 UG/ML
25 INJECTION, SOLUTION INTRAMUSCULAR; INTRAVENOUS EVERY 5 MIN PRN
Status: COMPLETED | OUTPATIENT
Start: 2019-04-16 | End: 2019-04-16

## 2019-04-16 RX ORDER — DIPHENHYDRAMINE HYDROCHLORIDE 50 MG/ML
25 INJECTION INTRAMUSCULAR; INTRAVENOUS EVERY 6 HOURS PRN
Status: DISCONTINUED | OUTPATIENT
Start: 2019-04-16 | End: 2019-04-16 | Stop reason: HOSPADM

## 2019-04-16 RX ORDER — ROCURONIUM BROMIDE 10 MG/ML
INJECTION, SOLUTION INTRAVENOUS
Status: DISCONTINUED | OUTPATIENT
Start: 2019-04-16 | End: 2019-04-16

## 2019-04-16 RX ORDER — ONDANSETRON 2 MG/ML
4 INJECTION INTRAMUSCULAR; INTRAVENOUS ONCE AS NEEDED
Status: DISCONTINUED | OUTPATIENT
Start: 2019-04-16 | End: 2019-04-16 | Stop reason: HOSPADM

## 2019-04-16 RX ORDER — ACETAMINOPHEN 10 MG/ML
INJECTION, SOLUTION INTRAVENOUS
Status: DISCONTINUED | OUTPATIENT
Start: 2019-04-16 | End: 2019-04-16

## 2019-04-16 RX ORDER — LIDOCAINE HCL/PF 100 MG/5ML
SYRINGE (ML) INTRAVENOUS
Status: DISCONTINUED | OUTPATIENT
Start: 2019-04-16 | End: 2019-04-16

## 2019-04-16 RX ORDER — SODIUM CHLORIDE 9 MG/ML
1000 INJECTION, SOLUTION INTRAVENOUS CONTINUOUS
Status: DISCONTINUED | OUTPATIENT
Start: 2019-04-16 | End: 2019-04-17

## 2019-04-16 RX ORDER — PROPOFOL 10 MG/ML
VIAL (ML) INTRAVENOUS
Status: DISCONTINUED | OUTPATIENT
Start: 2019-04-16 | End: 2019-04-16

## 2019-04-16 RX ORDER — NEOSTIGMINE METHYLSULFATE 1 MG/ML
INJECTION, SOLUTION INTRAVENOUS
Status: DISCONTINUED | OUTPATIENT
Start: 2019-04-16 | End: 2019-04-16

## 2019-04-16 RX ORDER — FENTANYL CITRATE 50 UG/ML
INJECTION, SOLUTION INTRAMUSCULAR; INTRAVENOUS
Status: DISCONTINUED | OUTPATIENT
Start: 2019-04-16 | End: 2019-04-16

## 2019-04-16 RX ORDER — GLYCOPYRROLATE 0.2 MG/ML
INJECTION INTRAMUSCULAR; INTRAVENOUS
Status: DISCONTINUED | OUTPATIENT
Start: 2019-04-16 | End: 2019-04-16

## 2019-04-16 RX ORDER — KETAMINE HYDROCHLORIDE 10 MG/ML
INJECTION, SOLUTION INTRAMUSCULAR; INTRAVENOUS
Status: DISCONTINUED | OUTPATIENT
Start: 2019-04-16 | End: 2019-04-16

## 2019-04-16 RX ORDER — ONDANSETRON 2 MG/ML
INJECTION INTRAMUSCULAR; INTRAVENOUS
Status: DISCONTINUED | OUTPATIENT
Start: 2019-04-16 | End: 2019-04-16

## 2019-04-16 RX ORDER — HYDROMORPHONE HYDROCHLORIDE 1 MG/ML
0.2 INJECTION, SOLUTION INTRAMUSCULAR; INTRAVENOUS; SUBCUTANEOUS EVERY 5 MIN PRN
Status: COMPLETED | OUTPATIENT
Start: 2019-04-16 | End: 2019-04-16

## 2019-04-16 RX ADMIN — HYDROMORPHONE HYDROCHLORIDE 0.2 MG: 1 INJECTION, SOLUTION INTRAMUSCULAR; INTRAVENOUS; SUBCUTANEOUS at 12:04

## 2019-04-16 RX ADMIN — CIPROFLOXACIN 400 MG: 2 INJECTION, SOLUTION INTRAVENOUS at 06:04

## 2019-04-16 RX ADMIN — LIDOCAINE HYDROCHLORIDE 100 MG: 20 INJECTION, SOLUTION INTRAVENOUS at 10:04

## 2019-04-16 RX ADMIN — HYDROMORPHONE HYDROCHLORIDE 0.2 MG: 1 INJECTION, SOLUTION INTRAMUSCULAR; INTRAVENOUS; SUBCUTANEOUS at 11:04

## 2019-04-16 RX ADMIN — MIDAZOLAM HYDROCHLORIDE 2 MG: 1 INJECTION, SOLUTION INTRAMUSCULAR; INTRAVENOUS at 10:04

## 2019-04-16 RX ADMIN — FENTANYL CITRATE 100 MCG: 50 INJECTION, SOLUTION INTRAMUSCULAR; INTRAVENOUS at 10:04

## 2019-04-16 RX ADMIN — SODIUM CHLORIDE, SODIUM LACTATE, POTASSIUM CHLORIDE, AND CALCIUM CHLORIDE 500 ML: .6; .31; .03; .02 INJECTION, SOLUTION INTRAVENOUS at 05:04

## 2019-04-16 RX ADMIN — CEFAZOLIN 2 G: 330 INJECTION, POWDER, FOR SOLUTION INTRAMUSCULAR; INTRAVENOUS at 10:04

## 2019-04-16 RX ADMIN — KETAMINE HYDROCHLORIDE 30 MG: 10 INJECTION, SOLUTION INTRAMUSCULAR; INTRAVENOUS at 10:04

## 2019-04-16 RX ADMIN — FENTANYL CITRATE 25 MCG: 50 INJECTION INTRAMUSCULAR; INTRAVENOUS at 12:04

## 2019-04-16 RX ADMIN — METOPROLOL SUCCINATE 50 MG: 50 TABLET, EXTENDED RELEASE ORAL at 08:04

## 2019-04-16 RX ADMIN — PREGABALIN 150 MG: 50 CAPSULE ORAL at 09:04

## 2019-04-16 RX ADMIN — NEOSTIGMINE METHYLSULFATE 3 MG: 1 INJECTION INTRAVENOUS at 11:04

## 2019-04-16 RX ADMIN — CELECOXIB 200 MG: 200 CAPSULE ORAL at 08:04

## 2019-04-16 RX ADMIN — PROPOFOL 150 MG: 10 INJECTION, EMULSION INTRAVENOUS at 10:04

## 2019-04-16 RX ADMIN — OXYCODONE HYDROCHLORIDE 5 MG: 5 TABLET ORAL at 05:04

## 2019-04-16 RX ADMIN — GLYCOPYRROLATE 0.2 MG: 0.2 INJECTION, SOLUTION INTRAMUSCULAR; INTRAVENOUS at 11:04

## 2019-04-16 RX ADMIN — ACETAMINOPHEN 1000 MG: 10 INJECTION, SOLUTION INTRAVENOUS at 10:04

## 2019-04-16 RX ADMIN — FENTANYL CITRATE 25 MCG: 50 INJECTION INTRAMUSCULAR; INTRAVENOUS at 11:04

## 2019-04-16 RX ADMIN — CIPROFLOXACIN 400 MG: 2 INJECTION, SOLUTION INTRAVENOUS at 04:04

## 2019-04-16 RX ADMIN — ONDANSETRON 4 MG: 2 INJECTION INTRAMUSCULAR; INTRAVENOUS at 11:04

## 2019-04-16 RX ADMIN — ROCURONIUM BROMIDE 40 MG: 10 INJECTION, SOLUTION INTRAVENOUS at 10:04

## 2019-04-16 RX ADMIN — SODIUM CHLORIDE, SODIUM GLUCONATE, SODIUM ACETATE, POTASSIUM CHLORIDE, MAGNESIUM CHLORIDE, SODIUM PHOSPHATE, DIBASIC, AND POTASSIUM PHOSPHATE: .53; .5; .37; .037; .03; .012; .00082 INJECTION, SOLUTION INTRAVENOUS at 11:04

## 2019-04-16 RX ADMIN — SODIUM CHLORIDE 1000 ML: 0.9 INJECTION, SOLUTION INTRAVENOUS at 10:04

## 2019-04-16 RX ADMIN — OXYCODONE HYDROCHLORIDE 10 MG: 10 TABLET ORAL at 09:04

## 2019-04-16 RX ADMIN — SODIUM CHLORIDE, SODIUM LACTATE, POTASSIUM CHLORIDE, AND CALCIUM CHLORIDE: .6; .31; .03; .02 INJECTION, SOLUTION INTRAVENOUS at 04:04

## 2019-04-16 RX ADMIN — OXYCODONE HYDROCHLORIDE 10 MG: 10 TABLET ORAL at 11:04

## 2019-04-16 NOTE — ANESTHESIA POSTPROCEDURE EVALUATION
Anesthesia Post Evaluation    Patient: Pranay Colindres    Procedure(s) Performed: Procedure(s) (LRB):  AMPUTATION, ABOVE KNEE left hip disarticulation (Left)  REPLACEMENT, WOUND VAC    Final Anesthesia Type: general  Patient location during evaluation: PACU  Patient participation: Yes- Able to Participate  Level of consciousness: awake and alert  Post-procedure vital signs: reviewed and stable  Pain management: adequate  Airway patency: patent  PONV status at discharge: No PONV  Anesthetic complications: no      Cardiovascular status: blood pressure returned to baseline  Respiratory status: unassisted  Hydration status: euvolemic  Follow-up not needed.          Vitals Value Taken Time   /85 4/16/2019  1:31 PM   Temp 36.8 °C (98.3 °F) 4/16/2019  1:31 PM   Pulse 100 4/16/2019  1:31 PM   Resp 14 4/16/2019  1:31 PM   SpO2 97 % 4/16/2019  1:31 PM         Event Time     Out of Recovery 04/16/2019 13:11:31          Pain/Yue Score: Pain Rating Prior to Med Admin: 5 (4/16/2019 12:32 PM)  Pain Rating Post Med Admin: 6 (4/16/2019 12:11 PM)  Yue Score: 10 (4/16/2019 12:30 PM)

## 2019-04-16 NOTE — TRANSFER OF CARE
"Anesthesia Transfer of Care Note    Patient: Pranay Colindres    Procedure(s) Performed: Procedure(s) (LRB):  AMPUTATION, ABOVE KNEE left hip disarticulation (Left)  REPLACEMENT, WOUND VAC    Patient location: PACU    Anesthesia Type: general    Transport from OR: Transported from OR on 6-10 L/min O2 by face mask with adequate spontaneous ventilation    Post pain: adequate analgesia    Post assessment: no apparent anesthetic complications    Post vital signs: stable    Level of consciousness: awake    Nausea/Vomiting: no nausea/vomiting    Complications: none    Transfer of care protocol was followed      Last vitals:   Visit Vitals  /87 (BP Location: Left arm, Patient Position: Lying)   Pulse 80   Temp 36.6 °C (97.8 °F) (Oral)   Resp 15   Ht 5' 8" (1.727 m)   Wt 67.1 kg (148 lb)   SpO2 100%   BMI 22.50 kg/m²     "

## 2019-04-16 NOTE — PLAN OF CARE
SW contacted patient sister Carito at [phone] as requested by patient to discuss SNF placement post discharge  and she reports that patient has had placement with [facility] in the past and would like to receive services with this facility again. NILAY sent a referral and will follow up.        Merline Simmons, OWEN  [facility]   [phone]

## 2019-04-16 NOTE — ASSESSMENT & PLAN NOTE
59 y.o. male with h/o HTN, arthritis, and cerebral palsy, tobacco abuse, PVD with previous aorto-bifem and L fem/pop. S/p emergent L AKA 3/20/19. Presents with fat necrosis L AKA stump and ischemic R foot rest pain. 5 Days Post-Op from L AKA washout/debridement with wound vac placement and Right AKA on 4/11/19. Left AKA with tracking muscle necrosis and purulence.    - NPO for surgery today   - mIVF at 100cc/hr  - Ropivicaine pain catheter remain in place, appreciate anesthesia assistance  - Bedside wound vac change (04/13/19)  - L AKA wound cx grew Proteus Mirabilus: continue IV cipro  - pain control prn  - PT/OT  - Pulmonary toilet    Dispo: To OR for Left hip disarticulation today.  Likely will need rehab vs snf post op.  Appreciate SW help

## 2019-04-16 NOTE — PLAN OF CARE
NILAY followed up [facility] in regard to the wound vac request and admissions representative reports the patient insurance [employer] is not in network with [facility]. SW followed up with [employer] [phone] for the wound vac and spoke with representative Lisandra and she states that she will send the wound vac form via email. NILAY will follow up.      Merline Simmons, OWEN  [facility]   [phone]

## 2019-04-16 NOTE — OP NOTE
4/16/2019    Pre-operative Diagnosis:    1. Atherosclerotic PVD with ulcer  2. S/p Left AKA with wound dehiscence and infection    Post-operative Diagnosis: same.    Procedures:  1. Planned revision of L AKA stump    Surgeon: Thalia Moreno MD     Assistants: Jamir Braun MD    Anesthesia: General    Findings/Key elements:   Clean L AKA stump wound   Femur resected 4cm  L AKA stump muscles debulked and wound partially closed   Wound vac (superficial)     Implants: * No implants in log *    EBL:  10 mL           Complications:  None; patient tolerated the procedure well.           Disposition: PACU.           Condition: stable    Procedure Details:  The patient was seen in the Holding Room. The risks, benefits, complications, treatment options, and expected outcomes were discussed with the patient. The patient concurred with the proposed plan, giving informed consent.  The site of surgery properly noted/marked.   Patient was brought to the operating room and positioned supine on the table. General anesthesia was administered by anesthesia team. Patient's Left AKA stump was prepped and draped in usual sterile fashion. A Time Out was held and the above information confirmed.    L AKA stump muscles were red and viable. Dissection around the femur was carried cephalad. Approximately 4cm of femur was transected using bone saw. Muscles were also debulked using Electrocautery. Although sluggish, muscles reacted to electrocautery stimulation. Hemostasis was obtained and wound was irrigated. Myodesis was then performed with 2-0 PDS sutures. Then Deep fascia was closed with 2-0 Vicryl stutures in interrupted fashion. Skin was left open and Wound vac was placed. No leak was noted.     Patient tolerated procedure well and transferred to PACU in stable condition.     Dr. Moreno was scrubbed and present for entire procedure.    Jamir Braun MD  Vascular/Endovascular Surgery Fellow

## 2019-04-16 NOTE — PLAN OF CARE
Patient expected to be discharged to Skilled Nursing Facility post discharge. SW attempted to meet with patient to discuss SNF placement but he was taken to surgery. SW will follow back up with SNF choices.    Merline Simmons LMSW  [facility]   [phone]

## 2019-04-16 NOTE — PROGRESS NOTES
[facility]  Vascular Surgery  Progress Note    Patient Name: Pranay Colindres  MRN: [id number]  Admission Date: 4/10/2019  Primary Care Provider: Jenaro Torres MD    Subjective:     Interval History: NAEON.  Pain controlled.  WBC down to 11 from 13    Post-Op Info:  Procedure(s) (LRB):  AMPUTATION, ABOVE KNEE (Right)  DEBRIDEMENT, WOUND (Left)  APPLICATION, WOUND VAC (Left)   5 Days Post-Op       Medications:  Continuous Infusions:   lactated ringers 100 mL/hr at 04/16/19 0437     Scheduled Meds:   celecoxib  200 mg Oral Daily    ciprofloxacin  400 mg Intravenous Q12H    metoprolol succinate  50 mg Oral Daily    pregabalin  150 mg Oral QHS     PRN Meds:bisacodyl, ondansetron, oxyCODONE, oxyCODONE, promethazine (PHENERGAN) IVPB, sodium chloride 0.9%, sodium chloride 0.9%, zolpidem     Objective:     Vital Signs (Most Recent):  Temp: 97.2 °F (36.2 °C) (04/16/19 0359)  Pulse: 96 (04/16/19 0359)  Resp: 16 (04/16/19 0359)  BP: (!) 163/81 (04/16/19 0359)  SpO2: 99 % (04/16/19 0359) Vital Signs (24h Range):  Temp:  [96.4 °F (35.8 °C)-98.3 °F (36.8 °C)] 97.2 °F (36.2 °C)  Pulse:  [] 96  Resp:  [16-17] 16  SpO2:  [98 %-100 %] 99 %  BP: (127-166)/(78-90) 163/81         Physical Exam   Constitutional: He is oriented to person, place, and time. He appears well-developed and well-nourished.   Neck: No JVD present. No tracheal deviation present.   Cardiovascular: Normal rate and regular rhythm.   Pulmonary/Chest: Effort normal. No respiratory distress.   Abdominal: Soft. He exhibits no distension. There is no tenderness. There is no guarding.   Musculoskeletal: He exhibits no edema.   L AKA with healthy red, perfusing tissue, no purulence or necrotic tissue noted, wound vac changed 04/13  R AKA incision c/d/i, mepore dressing reapplied   Neurological: He is alert and oriented to person, place, and time.   Skin: Skin is warm and dry.   Nursing note and vitals reviewed.      Significant Labs:  CBC:    Recent Labs   Lab 04/16/19  0358   WBC 11.41   RBC 3.53*   HGB 9.5*   HCT 27.1*   *   MCV 77*   MCH 26.9*   MCHC 35.1     CMP:   Recent Labs   Lab 04/16/19  0358      CALCIUM 9.4   ALBUMIN 2.5*   PROT 6.8      K 4.3   CO2 30*      BUN 7   CREATININE 0.6   ALKPHOS 105   ALT 16   AST 70*   BILITOT 0.2       Significant Diagnostics:  I have reviewed all pertinent imaging results/findings within the past 24 hours.    Assessment/Plan:     * Critical lower limb ischemia  59 y.o. male with h/o HTN, arthritis, and cerebral palsy, tobacco abuse, PVD with previous aorto-bifem and L fem/pop. S/p emergent L AKA 3/20/19. Presents with fat necrosis L AKA stump and ischemic R foot rest pain. 5 Days Post-Op from L AKA washout/debridement with wound vac placement and Right AKA on 4/11/19. Left AKA with tracking muscle necrosis and purulence.    - NPO for surgery today   - mIVF at 100cc/hr  - Ropivicaine pain catheter remain in place, appreciate anesthesia assistance  - Bedside wound vac change (04/13/19)  - L AKA wound cx grew Proteus Mirabilus: continue IV cipro  - pain control prn  - PT/OT  - Pulmonary toilet    Dispo: To OR for Left hip disarticulation today.  Likely will need rehab vs snf post op.  Appreciate SW help        Camacho Pete MD  Vascular Surgery  [facility]

## 2019-04-16 NOTE — SUBJECTIVE & OBJECTIVE
Medications:  Continuous Infusions:   lactated ringers 100 mL/hr at 04/16/19 0437     Scheduled Meds:   celecoxib  200 mg Oral Daily    ciprofloxacin  400 mg Intravenous Q12H    metoprolol succinate  50 mg Oral Daily    pregabalin  150 mg Oral QHS     PRN Meds:bisacodyl, ondansetron, oxyCODONE, oxyCODONE, promethazine (PHENERGAN) IVPB, sodium chloride 0.9%, sodium chloride 0.9%, zolpidem     Objective:     Vital Signs (Most Recent):  Temp: 97.2 °F (36.2 °C) (04/16/19 0359)  Pulse: 96 (04/16/19 0359)  Resp: 16 (04/16/19 0359)  BP: (!) 163/81 (04/16/19 0359)  SpO2: 99 % (04/16/19 0359) Vital Signs (24h Range):  Temp:  [96.4 °F (35.8 °C)-98.3 °F (36.8 °C)] 97.2 °F (36.2 °C)  Pulse:  [] 96  Resp:  [16-17] 16  SpO2:  [98 %-100 %] 99 %  BP: (127-166)/(78-90) 163/81         Physical Exam   Constitutional: He is oriented to person, place, and time. He appears well-developed and well-nourished.   Neck: No JVD present. No tracheal deviation present.   Cardiovascular: Normal rate and regular rhythm.   Pulmonary/Chest: Effort normal. No respiratory distress.   Abdominal: Soft. He exhibits no distension. There is no tenderness. There is no guarding.   Musculoskeletal: He exhibits no edema.   L AKA with healthy red, perfusing tissue, no purulence or necrotic tissue noted, wound vac changed 04/13  R AKA incision c/d/i, mepore dressing reapplied   Neurological: He is alert and oriented to person, place, and time.   Skin: Skin is warm and dry.   Nursing note and vitals reviewed.      Significant Labs:  CBC:   Recent Labs   Lab 04/16/19 0358   WBC 11.41   RBC 3.53*   HGB 9.5*   HCT 27.1*   *   MCV 77*   MCH 26.9*   MCHC 35.1     CMP:   Recent Labs   Lab 04/16/19 0358      CALCIUM 9.4   ALBUMIN 2.5*   PROT 6.8      K 4.3   CO2 30*      BUN 7   CREATININE 0.6   ALKPHOS 105   ALT 16   AST 70*   BILITOT 0.2       Significant Diagnostics:  I have reviewed all pertinent imaging results/findings  within the past 24 hours.

## 2019-04-16 NOTE — PLAN OF CARE
04/16/19 1439   Post-Acute Status   Post-Acute Authorization Placement   Post-Acute Placement Status Patient List Provided       NILAY met with patient at bedside to discuss SNF discharge plan and he reports that NILAY needs to contact his sister Carito at [phone]. NILAY will follow up with patient sister Carito.      Merline Simmons, OWEN  [facility]   [phone]

## 2019-04-16 NOTE — PLAN OF CARE
SW received the wound vac form from [facility] and completed part one of the form. SW contacted the MD in regard to the wound vac form that needs to be completed when time permit and left a message. According to [facility] admissions representative Lisandra she is requesting an Op Note, H&P and notes in regard to nutrition along with form once completed.  NILAY will follow up.      Merline Simmons, OWEN  [facility]   [phone]

## 2019-04-16 NOTE — PLAN OF CARE
faxed wound care form to [facility] [phone] will follow up.      Merline Simmons LMSW  [facility]   [id number]

## 2019-04-16 NOTE — NURSING TRANSFER
Nursing Transfer Note      4/16/2019     Transfer To: [address]    Transfer via stretcher    Transfer with None    Transported by PCT    Medicines sent: None    Chart send with patient: Yes    Notified: friend    Patient reassessed at: 4/16/19 1230    Upon arrival to floor: patient oriented to room, call bell in reach and bed in lowest position

## 2019-04-17 LAB
ALBUMIN SERPL BCP-MCNC: 2.3 G/DL (ref 3.5–5.2)
ALP SERPL-CCNC: 91 U/L (ref 55–135)
ALT SERPL W/O P-5'-P-CCNC: 13 U/L (ref 10–44)
ANION GAP SERPL CALC-SCNC: 9 MMOL/L (ref 8–16)
AST SERPL-CCNC: 57 U/L (ref 10–40)
BASOPHILS # BLD AUTO: 0.06 K/UL (ref 0–0.2)
BASOPHILS NFR BLD: 0.4 % (ref 0–1.9)
BILIRUB SERPL-MCNC: 0.3 MG/DL (ref 0.1–1)
BUN SERPL-MCNC: 6 MG/DL (ref 6–20)
CALCIUM SERPL-MCNC: 9 MG/DL (ref 8.7–10.5)
CHLORIDE SERPL-SCNC: 101 MMOL/L (ref 95–110)
CO2 SERPL-SCNC: 27 MMOL/L (ref 23–29)
CREAT SERPL-MCNC: 0.7 MG/DL (ref 0.5–1.4)
DIFFERENTIAL METHOD: ABNORMAL
EOSINOPHIL # BLD AUTO: 0.3 K/UL (ref 0–0.5)
EOSINOPHIL NFR BLD: 1.8 % (ref 0–8)
ERYTHROCYTE [DISTWIDTH] IN BLOOD BY AUTOMATED COUNT: 14.1 % (ref 11.5–14.5)
EST. GFR  (AFRICAN AMERICAN): >60 ML/MIN/1.73 M^2
EST. GFR  (NON AFRICAN AMERICAN): >60 ML/MIN/1.73 M^2
GLUCOSE SERPL-MCNC: 106 MG/DL (ref 70–110)
HCT VFR BLD AUTO: 26.5 % (ref 40–54)
HGB BLD-MCNC: 9.1 G/DL (ref 14–18)
IMM GRANULOCYTES # BLD AUTO: 0.16 K/UL (ref 0–0.04)
IMM GRANULOCYTES NFR BLD AUTO: 1 % (ref 0–0.5)
LYMPHOCYTES # BLD AUTO: 1.9 K/UL (ref 1–4.8)
LYMPHOCYTES NFR BLD: 12.2 % (ref 18–48)
MAGNESIUM SERPL-MCNC: 1.6 MG/DL (ref 1.6–2.6)
MCH RBC QN AUTO: 26.7 PG (ref 27–31)
MCHC RBC AUTO-ENTMCNC: 34.3 G/DL (ref 32–36)
MCV RBC AUTO: 78 FL (ref 82–98)
MONOCYTES # BLD AUTO: 1 K/UL (ref 0.3–1)
MONOCYTES NFR BLD: 6.7 % (ref 4–15)
NEUTROPHILS # BLD AUTO: 12.1 K/UL (ref 1.8–7.7)
NEUTROPHILS NFR BLD: 77.9 % (ref 38–73)
NRBC BLD-RTO: 0 /100 WBC
PHOSPHATE SERPL-MCNC: 3.8 MG/DL (ref 2.7–4.5)
PLATELET # BLD AUTO: 588 K/UL (ref 150–350)
PMV BLD AUTO: 9.8 FL (ref 9.2–12.9)
POTASSIUM SERPL-SCNC: 4.1 MMOL/L (ref 3.5–5.1)
PROT SERPL-MCNC: 6.3 G/DL (ref 6–8.4)
RBC # BLD AUTO: 3.41 M/UL (ref 4.6–6.2)
SODIUM SERPL-SCNC: 137 MMOL/L (ref 136–145)
WBC # BLD AUTO: 15.52 K/UL (ref 3.9–12.7)

## 2019-04-17 PROCEDURE — 25000003 PHARM REV CODE 250: Performed by: STUDENT IN AN ORGANIZED HEALTH CARE EDUCATION/TRAINING PROGRAM

## 2019-04-17 PROCEDURE — 80053 COMPREHEN METABOLIC PANEL: CPT

## 2019-04-17 PROCEDURE — 85025 COMPLETE CBC W/AUTO DIFF WBC: CPT

## 2019-04-17 PROCEDURE — 63600175 PHARM REV CODE 636 W HCPCS: Performed by: STUDENT IN AN ORGANIZED HEALTH CARE EDUCATION/TRAINING PROGRAM

## 2019-04-17 PROCEDURE — 83735 ASSAY OF MAGNESIUM: CPT

## 2019-04-17 PROCEDURE — 36415 COLL VENOUS BLD VENIPUNCTURE: CPT

## 2019-04-17 PROCEDURE — 11000001 HC ACUTE MED/SURG PRIVATE ROOM

## 2019-04-17 PROCEDURE — 84100 ASSAY OF PHOSPHORUS: CPT

## 2019-04-17 PROCEDURE — 63600175 PHARM REV CODE 636 W HCPCS: Performed by: SURGERY

## 2019-04-17 RX ORDER — HYDROMORPHONE HYDROCHLORIDE 1 MG/ML
0.5 INJECTION, SOLUTION INTRAMUSCULAR; INTRAVENOUS; SUBCUTANEOUS ONCE
Status: COMPLETED | OUTPATIENT
Start: 2019-04-17 | End: 2019-04-17

## 2019-04-17 RX ADMIN — OXYCODONE HYDROCHLORIDE 10 MG: 10 TABLET ORAL at 06:04

## 2019-04-17 RX ADMIN — CIPROFLOXACIN 400 MG: 2 INJECTION, SOLUTION INTRAVENOUS at 04:04

## 2019-04-17 RX ADMIN — METOPROLOL SUCCINATE 50 MG: 50 TABLET, EXTENDED RELEASE ORAL at 08:04

## 2019-04-17 RX ADMIN — CELECOXIB 200 MG: 200 CAPSULE ORAL at 08:04

## 2019-04-17 RX ADMIN — HYDROMORPHONE HYDROCHLORIDE 0.5 MG: 1 INJECTION, SOLUTION INTRAMUSCULAR; INTRAVENOUS; SUBCUTANEOUS at 05:04

## 2019-04-17 RX ADMIN — OXYCODONE HYDROCHLORIDE 10 MG: 10 TABLET ORAL at 01:04

## 2019-04-17 RX ADMIN — CIPROFLOXACIN 400 MG: 2 INJECTION, SOLUTION INTRAVENOUS at 05:04

## 2019-04-17 RX ADMIN — PREGABALIN 150 MG: 50 CAPSULE ORAL at 08:04

## 2019-04-17 RX ADMIN — OXYCODONE HYDROCHLORIDE 10 MG: 10 TABLET ORAL at 09:04

## 2019-04-17 RX ADMIN — OXYCODONE HYDROCHLORIDE 10 MG: 10 TABLET ORAL at 11:04

## 2019-04-17 NOTE — PROGRESS NOTES
[facility]  Vascular Surgery  Progress Note    Patient Name: Pranay Colindres  MRN: [id number]  Admission Date: 4/10/2019  Primary Care Provider: Jenaro Torres MD    Subjective:     Interval History: NAEON.  Tolerated procedure well.  Vac with good seal     Post-Op Info:  Procedure(s) (LRB):  AMPUTATION, ABOVE KNEE left hip disarticulation (Left)  REPLACEMENT, WOUND VAC   1 Day Post-Op       Medications:  Continuous Infusions:   sodium chloride 0.9% 1,000 mL (04/16/19 1020)    lactated ringers 100 mL/hr at 04/16/19 0437     Scheduled Meds:   celecoxib  200 mg Oral Daily    ciprofloxacin  400 mg Intravenous Q12H    metoprolol succinate  50 mg Oral Daily    pregabalin  150 mg Oral QHS     PRN Meds:bisacodyl, ondansetron, oxyCODONE, oxyCODONE, promethazine (PHENERGAN) IVPB, sodium chloride 0.9%, sodium chloride 0.9%, zolpidem     Objective:     Vital Signs (Most Recent):  Temp: 98.3 °F (36.8 °C) (04/17/19 0742)  Pulse: 107 (04/17/19 0742)  Resp: 19 (04/17/19 0742)  BP: 128/83 (04/17/19 0742)  SpO2: 99 % (04/17/19 0742) Vital Signs (24h Range):  Temp:  [96.4 °F (35.8 °C)-98.6 °F (37 °C)] 98.3 °F (36.8 °C)  Pulse:  [] 107  Resp:  [10-20] 19  SpO2:  [97 %-100 %] 99 %  BP: (123-156)/(71-96) 128/83     Date 04/17/19 0700 - 04/18/19 0659   Shift 5260-5018 6452-4876 4993-9480 24 Hour Total   INTAKE   Shift Total(mL/kg)       OUTPUT   Urine(mL/kg/hr) 600   600   Shift Total(mL/kg) 600(8.9)   600(8.9)   Weight (kg) 67.1 67.1 67.1 67.1       Physical Exam   Constitutional: He is oriented to person, place, and time. He appears well-developed and well-nourished.   Neck: No JVD present. No tracheal deviation present.   Cardiovascular: Normal rate and regular rhythm.   Pulmonary/Chest: Effort normal. No respiratory distress.   Abdominal: Soft. He exhibits no distension. There is no tenderness. There is no guarding.   Musculoskeletal: He exhibits no edema.   L AKA with wound vac, minimal edema   R AKA  incision c/d/i with island dressing    Neurological: He is alert and oriented to person, place, and time.   Skin: Skin is warm and dry.   Nursing note and vitals reviewed.      Significant Labs:  CBC:   Recent Labs   Lab 04/17/19  0510   WBC 15.52*   RBC 3.41*   HGB 9.1*   HCT 26.5*   *   MCV 78*   MCH 26.7*   MCHC 34.3     CMP:   Recent Labs   Lab 04/17/19  0510      CALCIUM 9.0   ALBUMIN 2.3*   PROT 6.3      K 4.1   CO2 27      BUN 6   CREATININE 0.7   ALKPHOS 91   ALT 13   AST 57*   BILITOT 0.3       Significant Diagnostics:  I have reviewed all pertinent imaging results/findings within the past 24 hours.    Assessment/Plan:     * Critical lower limb ischemia  59 y.o. male with h/o HTN, arthritis, and cerebral palsy, tobacco abuse, PVD with previous aorto-bifem and L fem/pop. S/p emergent L AKA 3/20/19. Presents with fat necrosis L AKA stump and ischemic R foot rest pain. 1 Day Post-Op from L AKA washout/debridement with wound vac placement and Right AKA on 4/11/19. Left AKA with tracking muscle necrosis and purulence.    - Regular diet  - Cipro  - PRN pain control  - Discuss resuming Eliquis     Dispo: Will be medically stable for discharge tomorrow.  Will change vac prior to d/c        Camacho Pete MD  Vascular Surgery  [facility]

## 2019-04-17 NOTE — PLAN OF CARE
Problem: Adult Inpatient Plan of Care  Goal: Plan of Care Review  Outcome: Ongoing (interventions implemented as appropriate)  Patient AAOx4 and VSS throughout the night. Q2H rounding and white board updating maintained. Call bell within reach. Pain well controlled with PRN medication. Wound vac continued - dressing intact and clean. No falls noted.

## 2019-04-17 NOTE — SUBJECTIVE & OBJECTIVE
Medications:  Continuous Infusions:   sodium chloride 0.9% 1,000 mL (04/16/19 1020)    lactated ringers 100 mL/hr at 04/16/19 0437     Scheduled Meds:   celecoxib  200 mg Oral Daily    ciprofloxacin  400 mg Intravenous Q12H    metoprolol succinate  50 mg Oral Daily    pregabalin  150 mg Oral QHS     PRN Meds:bisacodyl, ondansetron, oxyCODONE, oxyCODONE, promethazine (PHENERGAN) IVPB, sodium chloride 0.9%, sodium chloride 0.9%, zolpidem     Objective:     Vital Signs (Most Recent):  Temp: 98.3 °F (36.8 °C) (04/17/19 0742)  Pulse: 107 (04/17/19 0742)  Resp: 19 (04/17/19 0742)  BP: 128/83 (04/17/19 0742)  SpO2: 99 % (04/17/19 0742) Vital Signs (24h Range):  Temp:  [96.4 °F (35.8 °C)-98.6 °F (37 °C)] 98.3 °F (36.8 °C)  Pulse:  [] 107  Resp:  [10-20] 19  SpO2:  [97 %-100 %] 99 %  BP: (123-156)/(71-96) 128/83     Date 04/17/19 0700 - 04/18/19 0659   Shift 9402-1324 3168-1583 3449-9000 24 Hour Total   INTAKE   Shift Total(mL/kg)       OUTPUT   Urine(mL/kg/hr) 600   600   Shift Total(mL/kg) 600(8.9)   600(8.9)   Weight (kg) 67.1 67.1 67.1 67.1       Physical Exam   Constitutional: He is oriented to person, place, and time. He appears well-developed and well-nourished.   Neck: No JVD present. No tracheal deviation present.   Cardiovascular: Normal rate and regular rhythm.   Pulmonary/Chest: Effort normal. No respiratory distress.   Abdominal: Soft. He exhibits no distension. There is no tenderness. There is no guarding.   Musculoskeletal: He exhibits no edema.   L AKA with wound vac, minimal edema   R AKA incision c/d/i with island dressing    Neurological: He is alert and oriented to person, place, and time.   Skin: Skin is warm and dry.   Nursing note and vitals reviewed.      Significant Labs:  CBC:   Recent Labs   Lab 04/17/19  0510   WBC 15.52*   RBC 3.41*   HGB 9.1*   HCT 26.5*   *   MCV 78*   MCH 26.7*   MCHC 34.3     CMP:   Recent Labs   Lab 04/17/19  0510      CALCIUM 9.0   ALBUMIN 2.3*    PROT 6.3      K 4.1   CO2 27      BUN 6   CREATININE 0.7   ALKPHOS 91   ALT 13   AST 57*   BILITOT 0.3       Significant Diagnostics:  I have reviewed all pertinent imaging results/findings within the past 24 hours.

## 2019-04-17 NOTE — PLAN OF CARE
04/17/19 1302   Post-Acute Status   Post-Acute Authorization Placement   Post-Acute Placement Status Referrals Sent       [facility] declined the patient. Sent referrals to [facility], [place] (declined), [facility] and [facility].  Awaiting acceptance. SW in contact with CM and Medical staff. Will continue to follow and offer support as needed.     Bogdan Kelley LMSW  [facility]   Ext. [phone]

## 2019-04-17 NOTE — ASSESSMENT & PLAN NOTE
59 y.o. male with h/o HTN, arthritis, and cerebral palsy, tobacco abuse, PVD with previous aorto-bifem and L fem/pop. S/p emergent L AKA 3/20/19. Presents with fat necrosis L AKA stump and ischemic R foot rest pain. 1 Day Post-Op from L AKA washout/debridement with wound vac placement and Right AKA on 4/11/19. Left AKA with tracking muscle necrosis and purulence.    - Regular diet  - Cipro  - PRN pain control, will ask anesthesia to wean pain cath to off  - Discuss resuming Eliquis     Dispo: Will be medically stable for discharge tomorrow.  Will change vac prior to d/c

## 2019-04-17 NOTE — PLAN OF CARE
04/17/19 1427   Post-Acute Status   Post-Acute Authorization HME   E Status Referrals Sent     Pt's wound vac form had to be sent to [facility]. Faxed orders to [employer]. Awaiting approval.  NILAY in contact with CM and Medical staff. Will continue to follow and offer support as needed.     Bogdan Kelley LMSW  [facility]   Ext. [phone]

## 2019-04-18 LAB
ALBUMIN SERPL BCP-MCNC: 2.4 G/DL (ref 3.5–5.2)
ALP SERPL-CCNC: 87 U/L (ref 55–135)
ALT SERPL W/O P-5'-P-CCNC: 14 U/L (ref 10–44)
ANION GAP SERPL CALC-SCNC: 9 MMOL/L (ref 8–16)
AST SERPL-CCNC: 49 U/L (ref 10–40)
BASOPHILS # BLD AUTO: 0.05 K/UL (ref 0–0.2)
BASOPHILS NFR BLD: 0.4 % (ref 0–1.9)
BILIRUB SERPL-MCNC: 0.3 MG/DL (ref 0.1–1)
BUN SERPL-MCNC: 7 MG/DL (ref 6–20)
CALCIUM SERPL-MCNC: 9.3 MG/DL (ref 8.7–10.5)
CHLORIDE SERPL-SCNC: 99 MMOL/L (ref 95–110)
CO2 SERPL-SCNC: 29 MMOL/L (ref 23–29)
CREAT SERPL-MCNC: 0.6 MG/DL (ref 0.5–1.4)
DIFFERENTIAL METHOD: ABNORMAL
EOSINOPHIL # BLD AUTO: 0.4 K/UL (ref 0–0.5)
EOSINOPHIL NFR BLD: 2.8 % (ref 0–8)
ERYTHROCYTE [DISTWIDTH] IN BLOOD BY AUTOMATED COUNT: 14.3 % (ref 11.5–14.5)
EST. GFR  (AFRICAN AMERICAN): >60 ML/MIN/1.73 M^2
EST. GFR  (NON AFRICAN AMERICAN): >60 ML/MIN/1.73 M^2
GLUCOSE SERPL-MCNC: 107 MG/DL (ref 70–110)
HCT VFR BLD AUTO: 27.2 % (ref 40–54)
HGB BLD-MCNC: 9.1 G/DL (ref 14–18)
IMM GRANULOCYTES # BLD AUTO: 0.14 K/UL (ref 0–0.04)
IMM GRANULOCYTES NFR BLD AUTO: 1 % (ref 0–0.5)
LYMPHOCYTES # BLD AUTO: 2.4 K/UL (ref 1–4.8)
LYMPHOCYTES NFR BLD: 17.4 % (ref 18–48)
MAGNESIUM SERPL-MCNC: 1.6 MG/DL (ref 1.6–2.6)
MCH RBC QN AUTO: 26.6 PG (ref 27–31)
MCHC RBC AUTO-ENTMCNC: 33.5 G/DL (ref 32–36)
MCV RBC AUTO: 80 FL (ref 82–98)
MONOCYTES # BLD AUTO: 1 K/UL (ref 0.3–1)
MONOCYTES NFR BLD: 7.3 % (ref 4–15)
NEUTROPHILS # BLD AUTO: 9.7 K/UL (ref 1.8–7.7)
NEUTROPHILS NFR BLD: 71.1 % (ref 38–73)
NRBC BLD-RTO: 0 /100 WBC
PHOSPHATE SERPL-MCNC: 4.1 MG/DL (ref 2.7–4.5)
PLATELET # BLD AUTO: 599 K/UL (ref 150–350)
PMV BLD AUTO: 9.6 FL (ref 9.2–12.9)
POTASSIUM SERPL-SCNC: 4.2 MMOL/L (ref 3.5–5.1)
PROT SERPL-MCNC: 6.6 G/DL (ref 6–8.4)
RBC # BLD AUTO: 3.42 M/UL (ref 4.6–6.2)
SODIUM SERPL-SCNC: 137 MMOL/L (ref 136–145)
WBC # BLD AUTO: 13.59 K/UL (ref 3.9–12.7)

## 2019-04-18 PROCEDURE — 25000003 PHARM REV CODE 250: Performed by: STUDENT IN AN ORGANIZED HEALTH CARE EDUCATION/TRAINING PROGRAM

## 2019-04-18 PROCEDURE — 11000001 HC ACUTE MED/SURG PRIVATE ROOM

## 2019-04-18 PROCEDURE — 85025 COMPLETE CBC W/AUTO DIFF WBC: CPT

## 2019-04-18 PROCEDURE — 80053 COMPREHEN METABOLIC PANEL: CPT

## 2019-04-18 PROCEDURE — 83735 ASSAY OF MAGNESIUM: CPT

## 2019-04-18 PROCEDURE — 97164 PT RE-EVAL EST PLAN CARE: CPT

## 2019-04-18 PROCEDURE — 97112 NEUROMUSCULAR REEDUCATION: CPT

## 2019-04-18 PROCEDURE — 97168 OT RE-EVAL EST PLAN CARE: CPT

## 2019-04-18 PROCEDURE — 63600175 PHARM REV CODE 636 W HCPCS: Performed by: SURGERY

## 2019-04-18 PROCEDURE — 97530 THERAPEUTIC ACTIVITIES: CPT

## 2019-04-18 PROCEDURE — 97535 SELF CARE MNGMENT TRAINING: CPT

## 2019-04-18 PROCEDURE — 84100 ASSAY OF PHOSPHORUS: CPT

## 2019-04-18 PROCEDURE — 36415 COLL VENOUS BLD VENIPUNCTURE: CPT

## 2019-04-18 RX ORDER — LANOLIN ALCOHOL/MO/W.PET/CERES
800 CREAM (GRAM) TOPICAL ONCE
Status: COMPLETED | OUTPATIENT
Start: 2019-04-18 | End: 2019-04-18

## 2019-04-18 RX ADMIN — OXYCODONE HYDROCHLORIDE 10 MG: 10 TABLET ORAL at 07:04

## 2019-04-18 RX ADMIN — OXYCODONE HYDROCHLORIDE 10 MG: 10 TABLET ORAL at 10:04

## 2019-04-18 RX ADMIN — METOPROLOL SUCCINATE 50 MG: 50 TABLET, EXTENDED RELEASE ORAL at 09:04

## 2019-04-18 RX ADMIN — OXYCODONE HYDROCHLORIDE 10 MG: 10 TABLET ORAL at 05:04

## 2019-04-18 RX ADMIN — CIPROFLOXACIN 400 MG: 2 INJECTION, SOLUTION INTRAVENOUS at 05:04

## 2019-04-18 RX ADMIN — PREGABALIN 150 MG: 50 CAPSULE ORAL at 08:04

## 2019-04-18 RX ADMIN — CELECOXIB 200 MG: 200 CAPSULE ORAL at 09:04

## 2019-04-18 RX ADMIN — MAGNESIUM OXIDE TAB 400 MG (241.3 MG ELEMENTAL MG) 800 MG: 400 (241.3 MG) TAB at 09:04

## 2019-04-18 NOTE — PT/OT/SLP RE-EVAL
"Physical Therapy Re-evaluation    Patient Name:  Pranay Colindres   MRN:  [id number]    Recommendations:     Discharge Recommendations:  rehabilitation facility   Discharge Equipment Recommendations:     Barriers to discharge: Decreased caregiver support    Assessment:     Pranay Colindres is a 59 y.o. male admitted with a medical diagnosis of Critical lower limb ischemia.  He presents with the following impairments/functional limitations:  weakness, impaired endurance, impaired balance, decreased upper extremity function, decreased lower extremity function, decreased safety awareness, impaired cardiopulmonary response to activity, impaired coordination, impaired skin.    During today's re-evaluation, pt s/p L AKA. Pt with prior medical hx of R AKA as well. incr difficulty with performing slide board transfer req 2 person assistance for safety. Pt appropriate for discharge to IP rehab to maximize return to (I) state. Pt motivated to improve current level of functioning.     Rehab Prognosis:  Good; patient would benefit from acute skilled PT services to address these deficits and reach maximum level of function.      Recent Surgery: Procedure(s) (LRB):  AMPUTATION, ABOVE KNEE left hip disarticulation (Left)  REPLACEMENT, WOUND VAC 2 Days Post-Op    Plan:     During this hospitalization, patient to be seen 4 x/week to address the above listed problems via therapeutic activities, therapeutic exercises, neuromuscular re-education, wheelchair management/training  · Plan of Care Expires:  05/18/19   Plan of Care Reviewed with: patient    Subjective     Communicated with nsg prior to session.  Patient found supine with peripheral IV, wound vac upon PT entry to room, agreeable to evaluation.      Chief Complaint: pain in L residual limb  Patient comments/goals: "This is so hard" per pt during session   Pain/Comfort:  · Pain Rating 1: (did not rate)  · Location - Side 1: Left  · Location 1: (residual limb)  · Pain Addressed " 1: Reposition, Distraction    Patients cultural, spiritual, Religion conflicts given the current situation: no      Objective:     Patient found with: peripheral IV, wound vac     General Precautions: Standard, fall   Orthopedic Precautions:((B) AKA)   Braces: N/A       Exams:  Cognitive Exam  Patient is oriented to Person, Place, Time and Situation and follows 100% of multi-step commands    Fine Motor Coordination    -       Intact   Postural Exam Patient presented with the following abnormalities:    -       Rounded shoulders  -       Forward head   Sensation    -       Intact  light/touch (B) LE   Skin Integrity/Edema     -       Skin integrity: Visible skin intact   R LE ROM WFL   R LE Strength  WFL   L LE ROM WFL   L LE Strength  WFL       Functional Mobility  Bed Mobility  Supine to Sit: maximal assistance   Sit to Supine: moderate assistance   Transfers Bed<>Chair via slide board  maximal assistance and of 2 persons with no AD for 2 trials     Wheelchair propulsion Pt propelled wheelchair on level surface with (B) UE and CGA.          Balance   Static Sitting minimum assistance with (B) UE usage for stability   Dynamic Sitting moderate assistance       AM-PAC 6 CLICK MOBILITY  Total Score:10       Therapeutic Activities and Exercises:   Pt transferred to wheelchair via Scoot pivot with slide board transfer. Sat in chair for ~1 hr before being returned back to bed with 2 person assistance.   Educated on performing Hip flexor stretch in sidelying and prone position.        Patient left supine with all lines intact and call button in reach.    GOALS:   Multidisciplinary Problems     Physical Therapy Goals        Problem: Physical Therapy Goal    Goal Priority Disciplines Outcome Goal Variances Interventions   Physical Therapy Goal     PT, PT/OT Ongoing (interventions implemented as appropriate)     Description:  Goals to be met by: 4/28/19     Patient will increase functional independence with mobility by  performin. Supine to sit with Contact Guard Assistance - Not met  2. Sit to supine with Contact Guard Assistance - Not met  3. Bed to chair transfer with Minimal assistance via slideboard into wheelchair - Not met  4. Wheelchair propulsion x 500 feet with [place] using bilateral upper extremities - Not met  5. Sitting at edge of bed x 10 minutes with Contact Guard Assistance without (B) UE usage - Not met  6. Lower extremity exercise program x 10 reps per handout, with supervision - Not met                       History:     Past Medical History:   Diagnosis Date    Allergy     Arthritis     Hypertension     Neuropathy        Past Surgical History:   Procedure Laterality Date    ABDOMINAL SURGERY      AMPUTATION      right foot 5th digit    AMPUTATION, ABOVE KNEE Right 2019    Performed by [facility] at [facility]    AMPUTATION, ABOVE KNEE Left 3/20/2019    Performed by Eligio Olmos MD at [facility]    AMPUTATION, ABOVE KNEE left hip disarticulation Left 2019    Performed by [facility] at [facility]    APPLICATION, WOUND VAC Left 2019    Performed by [facility] at [facility]    COLONOSCOPY  2018    COLONOSCOPY N/A 2018    [facility] at [facility]    DEBRIDEMENT, WOUND Left 2019    Performed by [facility]alia [facility]no MD at [facility] OR [address] [place] [address]    ESOPHAGOGASTRODUODENOSCOPY  2018    ESOPHAGOGASTRODUODENOSCOPY (EGD) N/A 2018    Performed by [facility]an [facility] at [facility]    HIP SURGERY Bilateral     x2    REPLACEMENT, WOUND VAC  2019    Performed by Thalia Moreno MD at [facility] OR [address] [place] [address]       Time Tracking:     PT Received On: 19  PT Start Time: 910     PT Stop Time: 955  PT Total Time (min): 45 min +20 minutes for return to bedside    Billable Minutes: Re-eval 10, Therapeutic Activity 40       Tiana Ahmadi, PT  2019

## 2019-04-18 NOTE — PLAN OF CARE
04/18/19 1208   Post-Acute Status   Post-Acute Authorization Placement   Post-Acute Placement Status Referrals Sent       [facility] and [facility] the patient. They will submit for auth.  SW in contact with CM and Medical staff. Will continue to follow and offer support as needed.     Bogdan Kelley LMSW  [facility]   Ext. [phone]

## 2019-04-18 NOTE — PLAN OF CARE
Problem: Adult Inpatient Plan of Care  Goal: Plan of Care Review  Outcome: Ongoing (interventions implemented as appropriate)  Patient is oriented to person, place, and time. Up in wheelchair today and self-propelled down hallway. Pain is well controlled with oral pain meds. Safety precautions in place. Patient instructed to call for assist as needed.

## 2019-04-18 NOTE — SUBJECTIVE & OBJECTIVE
Medications:  Continuous Infusions:  Scheduled Meds:   celecoxib  200 mg Oral Daily    ciprofloxacin  400 mg Intravenous Q12H    metoprolol succinate  50 mg Oral Daily    pregabalin  150 mg Oral QHS     PRN Meds:bisacodyl, ondansetron, oxyCODONE, oxyCODONE, promethazine (PHENERGAN) IVPB, sodium chloride 0.9%, sodium chloride 0.9%, zolpidem     Objective:     Vital Signs (Most Recent):  Temp: 98.1 °F (36.7 °C) (04/18/19 0806)  Pulse: 95 (04/18/19 0806)  Resp: 18 (04/18/19 0806)  BP: 123/86 (04/18/19 0806)  SpO2: 98 % (04/18/19 0806) Vital Signs (24h Range):  Temp:  [97.5 °F (36.4 °C)-98.4 °F (36.9 °C)] 98.1 °F (36.7 °C)  Pulse:  [] 95  Resp:  [16-21] 18  SpO2:  [95 %-99 %] 98 %  BP: (113-141)/(76-91) 123/86         Physical Exam   Constitutional: He is oriented to person, place, and time. He appears well-developed and well-nourished.   Neck: No JVD present. No tracheal deviation present.   Cardiovascular: Normal rate and regular rhythm.   Pulmonary/Chest: Effort normal. No respiratory distress.   Abdominal: Soft. He exhibits no distension. There is no tenderness. There is no guarding.   Musculoskeletal: He exhibits no edema.   L AKA with wound vac, minimal edema   R AKA incision c/d/i with island dressing    Neurological: He is alert and oriented to person, place, and time.   Skin: Skin is warm and dry.   Nursing note and vitals reviewed.      Significant Labs:  CBC:   Recent Labs   Lab 04/18/19 0527   WBC 13.59*   RBC 3.42*   HGB 9.1*   HCT 27.2*   *   MCV 80*   MCH 26.6*   MCHC 33.5     CMP:   Recent Labs   Lab 04/18/19 0527      CALCIUM 9.3   ALBUMIN 2.4*   PROT 6.6      K 4.2   CO2 29   CL 99   BUN 7   CREATININE 0.6   ALKPHOS 87   ALT 14   AST 49*   BILITOT 0.3       Significant Diagnostics:  I have reviewed all pertinent imaging results/findings within the past 24 hours.

## 2019-04-18 NOTE — PROGRESS NOTES
[facility]  Vascular Surgery  Progress Note    Patient Name: Pranay Colindres  MRN: [id number]  Admission Date: 4/10/2019  Primary Care Provider: Jenaro Torres MD    Subjective:     Interval History: No acute events overnight.  VSS, AF. Pain well controlled. Vac draining SS output.     Post-Op Info:  Procedure(s) (LRB):  AMPUTATION, ABOVE KNEE left hip disarticulation (Left)  REPLACEMENT, WOUND VAC   2 Days Post-Op       Medications:  Continuous Infusions:  Scheduled Meds:   celecoxib  200 mg Oral Daily    ciprofloxacin  400 mg Intravenous Q12H    metoprolol succinate  50 mg Oral Daily    pregabalin  150 mg Oral QHS     PRN Meds:bisacodyl, ondansetron, oxyCODONE, oxyCODONE, promethazine (PHENERGAN) IVPB, sodium chloride 0.9%, sodium chloride 0.9%, zolpidem     Objective:     Vital Signs (Most Recent):  Temp: 98.1 °F (36.7 °C) (04/18/19 0806)  Pulse: 95 (04/18/19 0806)  Resp: 18 (04/18/19 0806)  BP: 123/86 (04/18/19 0806)  SpO2: 98 % (04/18/19 0806) Vital Signs (24h Range):  Temp:  [97.5 °F (36.4 °C)-98.4 °F (36.9 °C)] 98.1 °F (36.7 °C)  Pulse:  [] 95  Resp:  [16-21] 18  SpO2:  [95 %-99 %] 98 %  BP: (113-141)/(76-91) 123/86         Physical Exam   Constitutional: He is oriented to person, place, and time. He appears well-developed and well-nourished.   Neck: No JVD present. No tracheal deviation present.   Cardiovascular: Normal rate and regular rhythm.   Pulmonary/Chest: Effort normal. No respiratory distress.   Abdominal: Soft. He exhibits no distension. There is no tenderness. There is no guarding.   Musculoskeletal: He exhibits no edema.   L AKA with wound vac, minimal edema   R AKA incision c/d/i with island dressing    Neurological: He is alert and oriented to person, place, and time.   Skin: Skin is warm and dry.   Nursing note and vitals reviewed.      Significant Labs:  CBC:   Recent Labs   Lab 04/18/19  0527   WBC 13.59*   RBC 3.42*   HGB 9.1*   HCT 27.2*   *   MCV 80*    MCH 26.6*   MCHC 33.5     CMP:   Recent Labs   Lab 04/18/19  0527      CALCIUM 9.3   ALBUMIN 2.4*   PROT 6.6      K 4.2   CO2 29   CL 99   BUN 7   CREATININE 0.6   ALKPHOS 87   ALT 14   AST 49*   BILITOT 0.3       Significant Diagnostics:  I have reviewed all pertinent imaging results/findings within the past 24 hours.    Assessment/Plan:     * Critical lower limb ischemia  59 y.o. male with h/o HTN, arthritis, and cerebral palsy, tobacco abuse, PVD with previous aorto-bifem and L fem/pop. S/p emergent L AKA 3/20/19. Presents with fat necrosis L AKA stump and ischemic R foot rest pain. 2 Days Post-Op from L AKA washout/debridement with wound vac placement and Right AKA on 4/11/19. Left AKA with tracking muscle necrosis and purulence.    - Regular diet  - Cipro, will send out on PO  - PRN pain control  - Discuss resuming Eliquis     Dispo: Medically stable for discharge today.  Placement pending. Will change vac prior to d/c        Denisha Chambers MD  Vascular Surgery  [facility]

## 2019-04-18 NOTE — PLAN OF CARE
Problem: Physical Therapy Goal  Goal: Physical Therapy Goal  Goals to be met by: 19     Patient will increase functional independence with mobility by performin. Supine to sit with Contact Guard Assistance - Not met  2. Sit to supine with Contact Guard Assistance - Not met  3. Bed to chair transfer with Minimal assistance via slideboard into wheelchair - Not met  4. Wheelchair propulsion x 500 feet with [place] using bilateral upper extremities - Not met  5. Sitting at edge of bed x 10 minutes with Contact Guard Assistance without (B) UE usage - Not met  6. Lower extremity exercise program x 10 reps per handout, with supervision - Not met     Outcome: Ongoing (interventions implemented as appropriate)  PT re-evaluation completed. POC adjusted for current level of care.    Tiana Ahmadi, PT, DPT  2019

## 2019-04-18 NOTE — PLAN OF CARE
Problem: Occupational Therapy Goal  Goal: Occupational Therapy Goal  Re-Eval performed on 4/18/2019  Goals to be met by: 4/25/2019     Patient will increase functional independence with ADLs by performing:    UE Dressing with Contact Guard Assistance.  LE Dressing with Moderate Assistance.  Grooming while sitting up in w/c with Modified [place].  Toileting from bedside commode with Minimal Assistance for hygiene and clothing management.   Supine to sit with Contact Guard Assistance.  Toilet transfer to bedside commode via scooting technique with Moderate Assistance.     Outcome: Ongoing (interventions implemented as appropriate)  OT re-eval complete. Pt tolerated session well. All functional outcomes reviewed and remain appropriate at this time.     Comments: Cont OT POC

## 2019-04-18 NOTE — ASSESSMENT & PLAN NOTE
59 y.o. male with h/o HTN, arthritis, and cerebral palsy, tobacco abuse, PVD with previous aorto-bifem and L fem/pop. S/p emergent L AKA 3/20/19. Presents with fat necrosis L AKA stump and ischemic R foot rest pain. 2 Days Post-Op from L AKA washout/debridement with wound vac placement and Right AKA on 4/11/19. Left AKA with tracking muscle necrosis and purulence.    - Regular diet  - Cipro, will send out on PO  - PRN pain control  - Discuss resuming Eliquis     Dispo: Medically stable for discharge today.  Placement pending. Will change vac prior to d/c

## 2019-04-18 NOTE — PT/OT/SLP RE-EVAL
Occupational Therapy   Re-evaluation/ Treatment    Name: Pranay Colindres  MRN: [id number]  Admitting Diagnosis:  Critical lower limb ischemia 2 Days Post-Op    Recommendations:     Discharge Recommendations: rehabilitation facility  Discharge Equipment Recommendations:  commode  Barriers to discharge:  None    Assessment:     Pranay Colindres is a 59 y.o. male with a medical diagnosis of Critical lower limb ischemia.  He presents with the following performance deficits affecting function:  weakness, impaired endurance, impaired functional mobilty, impaired self care skills, impaired balance, decreased lower extremity function, pain, impaired skin.      OT re-eval complete. Pt tolerated session well. Pt noted to be in pain w/ movement but did not rate. Pt continues to maintain good UB strength and B UE ROM to facilitate all functional mobility. Using the bed handrails, pt required CGA for supine > sit but required Max A for scooting/ weight shifting to EOB in prep for functional activity. Pt received education on weight shifting laterally to one side to advance opposite residual limb and vice versa. Pt participated in donning pj pants w/ adaptive technique of weight shifting and utilizing B UE function to both off load weight and perform task of pulling pant leg over waist. Pt continues to benefit from skilled OT to address the above listed impairments.     Rehab Prognosis:  Good; patient would benefit from acute skilled OT services to address these deficits and reach maximum level of function.       Plan:     Patient to be seen 4 x/week to address the above listed problems via self-care/home management, therapeutic activities, therapeutic exercises  · Plan of Care Expires: 05/17/19  · Plan of Care Reviewed with: patient    Subjective     Chief Complaint: pain  Patient/Family stated goals: to get better  Communicated with: RN prior to session.  Pain/Comfort:  · Pain Rating 1: (did not rate)  · Location - Side 1:  Bilateral  · Location 1: (residual limbs)  · Pain Addressed 1: Reposition, Cessation of Activity, Nurse notified  · Pain Rating Post-Intervention 1: (did not rate)    Objective:     Communicated with: RN prior to session.  Patient found supine with: (no active lines) upon OT entry to room.    General Precautions: Standard, fall   Orthopedic Precautions:(B AKA )   Braces:       Occupational Performance:    Bed Mobility:    · Patient completed Scooting/weight shifting to EOB with maximal assistance  · Patient completed Supine to Sit with contact guard assistance and using hand rails     Functional Mobility/Transfers:  · Patient completed Bed <> Chair Transfer using Slide Board technique with moderate assistance with wheelchair  · Functional Mobility: Pt propelled w/c 300ft w/ verbal cues to align hips and avoid placing weight posteriorly to prevent w/c from tipping.     Activities of Daily Living:  · Lower Body Dressing: maximal assistance and education on adaptive technique sitting EOB    Cognitive/Visual Perceptual:  Cognitive/Psychosocial Skills:     -       Oriented to: Person, Place and Time   -       Follows Commands/attention:Follows multistep  commands  -       Communication: clear/fluent    Physical Exam:  Upper Extremity Range of Motion:     -       Right Upper Extremity: WFL  -       Left Upper Extremity: WFL  Upper Extremity Strength:    -       Right Upper Extremity: WFL  -       Left Upper Extremity: WFL   Strength:    -       Right Upper Extremity: WFL  -       Left Upper Extremity: WFL  Fine Motor Coordination:    -       Intact    AMPAC 6 Click:  AMPAC Total Score: 17    Treatment & Education:   -Education:   OT role/POC  - Importance of OOB activity to maximize recovery   - safety w/ all functional mobility; hand placement to ensure safe bed mobility and slide board transfer to w/c  - LBD adaptive technique  - facilitative strategies of weight shifting and off loading to perform scooting to  EOB    Patient left up in chair with call button in reach and RN notified    GOALS:   Multidisciplinary Problems     Occupational Therapy Goals        Problem: Occupational Therapy Goal    Goal Priority Disciplines Outcome Interventions   Occupational Therapy Goal     OT, PT/OT Ongoing (interventions implemented as appropriate)    Description:  Re-Eval performed on 4/18/2019  Goals to be met by: 4/25/2019     Patient will increase functional independence with ADLs by performing:    UE Dressing with Contact Guard Assistance.  LE Dressing with Moderate Assistance.  Grooming while sitting up in w/c with Modified [place].  Toileting from bedside commode with Minimal Assistance for hygiene and clothing management.   Supine to sit with Contact Guard Assistance.  Toilet transfer to bedside commode via scooting technique with Moderate Assistance.                       History:     Past Medical History:   Diagnosis Date    Allergy     Arthritis     Hypertension     Neuropathy        Past Surgical History:   Procedure Laterality Date    ABDOMINAL SURGERY      AMPUTATION      right foot 5th digit    AMPUTATION, ABOVE KNEE Right 4/11/2019    Performed [facility] 2ND F[facility]    AMPUTATION, ABOVE KNEE Left 3/20/2019    Performed by [facility]mos [facility] at [facility] 2ND [facility]LR    AMPUTATION, ABOVE KNEE left hip disarticulation Left 4/16/2019    Performed [facility] 2ND [facility]LR    APPLICATION, WOUND VAC Left 4/11/2019    Performed [facility] at [facility] 2ND [facility]LR    COLONOSCOPY  04/23/2018    COLONOSCOPY N/A 4/23/2018    [facility]    DEBRIDEMENT, WOUND Left 4/11/2019    Performed by [facility] at [facility] OR 2ND [facility]LR    ESOPHAGOGASTRODUODENOSCOPY  04/23/2018    ESOPHAGOGASTRODUODENOSCOPY (EGD) N/A 4/23/2018    Performed by [facility]an [facility] at [facility]    HIP SURGERY Bilateral     x2    REPLACEMENT, WOUND VAC   4/16/2019    Performed by [facility] at [facility] OR [address] [place] [address]       Time Tracking:     OT Date of Treatment: 04/18/19  OT Start Time: 0905  OT Stop Time: 0950  OT Total Time (min): 45 min    Billable Minutes:Re-eval 10  Self Care/Home Management 15  Neuromuscular Re-education 20    Mandy Sanders, OT  4/18/2019

## 2019-04-19 LAB
ALBUMIN SERPL BCP-MCNC: 2.6 G/DL (ref 3.5–5.2)
ALP SERPL-CCNC: 97 U/L (ref 55–135)
ALT SERPL W/O P-5'-P-CCNC: 19 U/L (ref 10–44)
ANION GAP SERPL CALC-SCNC: 11 MMOL/L (ref 8–16)
AST SERPL-CCNC: 63 U/L (ref 10–40)
BASOPHILS # BLD AUTO: 0.08 K/UL (ref 0–0.2)
BASOPHILS NFR BLD: 0.7 % (ref 0–1.9)
BILIRUB SERPL-MCNC: 0.3 MG/DL (ref 0.1–1)
BUN SERPL-MCNC: 11 MG/DL (ref 6–20)
CALCIUM SERPL-MCNC: 9.9 MG/DL (ref 8.7–10.5)
CHLORIDE SERPL-SCNC: 97 MMOL/L (ref 95–110)
CO2 SERPL-SCNC: 30 MMOL/L (ref 23–29)
CREAT SERPL-MCNC: 0.7 MG/DL (ref 0.5–1.4)
DIFFERENTIAL METHOD: ABNORMAL
EOSINOPHIL # BLD AUTO: 0.4 K/UL (ref 0–0.5)
EOSINOPHIL NFR BLD: 3.4 % (ref 0–8)
ERYTHROCYTE [DISTWIDTH] IN BLOOD BY AUTOMATED COUNT: 14.5 % (ref 11.5–14.5)
EST. GFR  (AFRICAN AMERICAN): >60 ML/MIN/1.73 M^2
EST. GFR  (NON AFRICAN AMERICAN): >60 ML/MIN/1.73 M^2
GLUCOSE SERPL-MCNC: 102 MG/DL (ref 70–110)
HCT VFR BLD AUTO: 28.2 % (ref 40–54)
HGB BLD-MCNC: 9.5 G/DL (ref 14–18)
IMM GRANULOCYTES # BLD AUTO: 0.15 K/UL (ref 0–0.04)
IMM GRANULOCYTES NFR BLD AUTO: 1.2 % (ref 0–0.5)
LYMPHOCYTES # BLD AUTO: 2.1 K/UL (ref 1–4.8)
LYMPHOCYTES NFR BLD: 17.5 % (ref 18–48)
MAGNESIUM SERPL-MCNC: 1.8 MG/DL (ref 1.6–2.6)
MCH RBC QN AUTO: 26.7 PG (ref 27–31)
MCHC RBC AUTO-ENTMCNC: 33.7 G/DL (ref 32–36)
MCV RBC AUTO: 79 FL (ref 82–98)
MONOCYTES # BLD AUTO: 0.9 K/UL (ref 0.3–1)
MONOCYTES NFR BLD: 7 % (ref 4–15)
NEUTROPHILS # BLD AUTO: 8.6 K/UL (ref 1.8–7.7)
NEUTROPHILS NFR BLD: 70.2 % (ref 38–73)
NRBC BLD-RTO: 0 /100 WBC
PHOSPHATE SERPL-MCNC: 4.6 MG/DL (ref 2.7–4.5)
PLATELET # BLD AUTO: 671 K/UL (ref 150–350)
PMV BLD AUTO: 9.8 FL (ref 9.2–12.9)
POTASSIUM SERPL-SCNC: 4.4 MMOL/L (ref 3.5–5.1)
PROT SERPL-MCNC: 7.2 G/DL (ref 6–8.4)
RBC # BLD AUTO: 3.56 M/UL (ref 4.6–6.2)
SODIUM SERPL-SCNC: 138 MMOL/L (ref 136–145)
WBC # BLD AUTO: 12.17 K/UL (ref 3.9–12.7)

## 2019-04-19 PROCEDURE — 25000003 PHARM REV CODE 250: Performed by: STUDENT IN AN ORGANIZED HEALTH CARE EDUCATION/TRAINING PROGRAM

## 2019-04-19 PROCEDURE — 25000003 PHARM REV CODE 250: Performed by: SURGERY

## 2019-04-19 PROCEDURE — 85025 COMPLETE CBC W/AUTO DIFF WBC: CPT

## 2019-04-19 PROCEDURE — 11000001 HC ACUTE MED/SURG PRIVATE ROOM

## 2019-04-19 PROCEDURE — 36415 COLL VENOUS BLD VENIPUNCTURE: CPT

## 2019-04-19 PROCEDURE — 84100 ASSAY OF PHOSPHORUS: CPT

## 2019-04-19 PROCEDURE — 80053 COMPREHEN METABOLIC PANEL: CPT

## 2019-04-19 PROCEDURE — 63600175 PHARM REV CODE 636 W HCPCS: Performed by: SURGERY

## 2019-04-19 PROCEDURE — 83735 ASSAY OF MAGNESIUM: CPT

## 2019-04-19 RX ORDER — AMOXICILLIN 250 MG
1 CAPSULE ORAL DAILY
Status: DISCONTINUED | OUTPATIENT
Start: 2019-04-19 | End: 2019-04-25 | Stop reason: HOSPADM

## 2019-04-19 RX ORDER — POLYETHYLENE GLYCOL 3350 17 G/17G
17 POWDER, FOR SOLUTION ORAL DAILY
Status: DISCONTINUED | OUTPATIENT
Start: 2019-04-19 | End: 2019-04-25 | Stop reason: HOSPADM

## 2019-04-19 RX ADMIN — METOPROLOL SUCCINATE 50 MG: 50 TABLET, EXTENDED RELEASE ORAL at 08:04

## 2019-04-19 RX ADMIN — CELECOXIB 200 MG: 200 CAPSULE ORAL at 08:04

## 2019-04-19 RX ADMIN — OXYCODONE HYDROCHLORIDE 10 MG: 10 TABLET ORAL at 08:04

## 2019-04-19 RX ADMIN — OXYCODONE HYDROCHLORIDE 10 MG: 10 TABLET ORAL at 06:04

## 2019-04-19 RX ADMIN — POLYETHYLENE GLYCOL 3350 17 G: 17 POWDER, FOR SOLUTION ORAL at 08:04

## 2019-04-19 RX ADMIN — PREGABALIN 225 MG: 150 CAPSULE ORAL at 08:04

## 2019-04-19 RX ADMIN — OXYCODONE HYDROCHLORIDE 10 MG: 10 TABLET ORAL at 10:04

## 2019-04-19 RX ADMIN — CIPROFLOXACIN 400 MG: 2 INJECTION, SOLUTION INTRAVENOUS at 06:04

## 2019-04-19 RX ADMIN — SENNOSIDES AND DOCUSATE SODIUM 1 TABLET: 8.6; 5 TABLET ORAL at 11:04

## 2019-04-19 RX ADMIN — OXYCODONE HYDROCHLORIDE 5 MG: 5 TABLET ORAL at 02:04

## 2019-04-19 RX ADMIN — CIPROFLOXACIN 400 MG: 2 INJECTION, SOLUTION INTRAVENOUS at 05:04

## 2019-04-19 RX ADMIN — OXYCODONE HYDROCHLORIDE 10 MG: 10 TABLET ORAL at 02:04

## 2019-04-19 NOTE — ASSESSMENT & PLAN NOTE
59 y.o. male with h/o HTN, arthritis, and cerebral palsy, tobacco abuse, PVD with previous aorto-bifem and L fem/pop. S/p emergent L AKA 3/20/19. Presents with fat necrosis L AKA stump and ischemic R foot rest pain. 3 Days Post-Op from L AKA washout/debridement with wound vac placement and Right AKA on 4/11/19. Left AKA with tracking muscle necrosis and purulence.    - Regular diet  - IV Cipro while in house, will send out on PO cipro to complete total 14 days of ABx  - PRN pain control   - Increased pregabalin this am     Dispo: Medically stable for discharge today.  Placement pending. Will change vac prior to d/c

## 2019-04-19 NOTE — H&P
[facility]  Vascular Surgery  History and Physical     Patient Name: Pranay Colindres  MRN: [id number]  Admission Date: 4/10/2019  Code Status: Full Code   Attending Physician: Thalia Moreno MD  Primary Care Physician: Jenaro Torres MD    Subjective:     Chief Complaint/Reason for Admission: mild pain at left AKA stump overnight. No acute events     HPI:  No notes on file      Medications:  Continuous Infusions:  Scheduled Meds:   celecoxib  200 mg Oral Daily    ciprofloxacin  400 mg Intravenous Q12H    metoprolol succinate  50 mg Oral Daily    polyethylene glycol  17 g Oral Daily    pregabalin  150 mg Oral QHS     PRN Meds:bisacodyl, ondansetron, oxyCODONE, oxyCODONE, promethazine (PHENERGAN) IVPB, sodium chloride 0.9%, sodium chloride 0.9%, zolpidem     Objective:     Vital Signs (Most Recent):  Temp: 98.1 °F (36.7 °C) (04/19/19 0515)  Pulse: 103 (04/19/19 0515)  Resp: 18 (04/19/19 0515)  BP: (!) 146/88 (04/19/19 0515)  SpO2: 98 % (04/19/19 0515) Vital Signs (24h Range):  Temp:  [97.4 °F (36.3 °C)-98.1 °F (36.7 °C)] 98.1 °F (36.7 °C)  Pulse:  [103-114] 103  Resp:  [18] 18  SpO2:  [98 %-100 %] 98 %  BP: (119-146)/(82-92) 146/88         Physical Exam   Constitutional: He is oriented to person, place, and time. He appears well-developed and well-nourished.   Neck: No JVD present. No tracheal deviation present.   Cardiovascular: Normal rate and regular rhythm.   Pulmonary/Chest: Effort normal. No respiratory distress.   Abdominal: Soft. He exhibits no distension. There is no tenderness. There is no guarding.   Musculoskeletal: He exhibits no edema.   L AKA with wound vac, minimal edema   R AKA incision c/d/i with island dressing    Neurological: He is alert and oriented to person, place, and time.   Skin: Skin is warm and dry.   Nursing note and vitals reviewed.      Significant Labs:  CBC:   Recent Labs   Lab 04/19/19 0539   WBC 12.17   RBC 3.56*   HGB 9.5*   HCT 28.2*   *   MCV 79*    MCH 26.7*   MCHC 33.7     CMP:   Recent Labs   Lab 04/19/19  0539      CALCIUM 9.9   ALBUMIN 2.6*   PROT 7.2      K 4.4   CO2 30*   CL 97   BUN 11   CREATININE 0.7   ALKPHOS 97   ALT 19   AST 63*   BILITOT 0.3       Significant Diagnostics:  I have reviewed all pertinent imaging results/findings within the past 24 hours.    Assessment and Plan:     * Critical lower limb ischemia  59 y.o. male with h/o HTN, arthritis, and cerebral palsy, tobacco abuse, PVD with previous aorto-bifem and L fem/pop. S/p emergent L AKA 3/20/19. Presents with fat necrosis L AKA stump and ischemic R foot rest pain. 3 Days Post-Op from L AKA washout/debridement with wound vac placement and Right AKA on 4/11/19. Left AKA with tracking muscle necrosis and purulence.    - Regular diet  - IV Cipro while in house, will send out on PO cipro to complete total 14 days of ABx  - PRN pain control   - Increased pregabalin this am     Dispo: Medically stable for discharge today.  Placement pending. Will change vac prior to d/c        Jamir Braun MD  Vascular Surgery  [facility]

## 2019-04-19 NOTE — PLAN OF CARE
Problem: Adult Inpatient Plan of Care  Goal: Plan of Care Review  Outcome: Ongoing (interventions implemented as appropriate)  Quiet hours. Med x2 for c/o pain with relief. Wound vac remains intact to left stump with minimal drainage noted. VSS. Slept well. Safety maintained.

## 2019-04-19 NOTE — SUBJECTIVE & OBJECTIVE
Medications:  Continuous Infusions:  Scheduled Meds:   celecoxib  200 mg Oral Daily    ciprofloxacin  400 mg Intravenous Q12H    metoprolol succinate  50 mg Oral Daily    polyethylene glycol  17 g Oral Daily    pregabalin  150 mg Oral QHS     PRN Meds:bisacodyl, ondansetron, oxyCODONE, oxyCODONE, promethazine (PHENERGAN) IVPB, sodium chloride 0.9%, sodium chloride 0.9%, zolpidem     Objective:     Vital Signs (Most Recent):  Temp: 98.1 °F (36.7 °C) (04/19/19 0515)  Pulse: 103 (04/19/19 0515)  Resp: 18 (04/19/19 0515)  BP: (!) 146/88 (04/19/19 0515)  SpO2: 98 % (04/19/19 0515) Vital Signs (24h Range):  Temp:  [97.4 °F (36.3 °C)-98.1 °F (36.7 °C)] 98.1 °F (36.7 °C)  Pulse:  [103-114] 103  Resp:  [18] 18  SpO2:  [98 %-100 %] 98 %  BP: (119-146)/(82-92) 146/88         Physical Exam   Constitutional: He is oriented to person, place, and time. He appears well-developed and well-nourished.   Neck: No JVD present. No tracheal deviation present.   Cardiovascular: Normal rate and regular rhythm.   Pulmonary/Chest: Effort normal. No respiratory distress.   Abdominal: Soft. He exhibits no distension. There is no tenderness. There is no guarding.   Musculoskeletal: He exhibits no edema.   L AKA with wound vac, minimal edema   R AKA incision c/d/i with island dressing    Neurological: He is alert and oriented to person, place, and time.   Skin: Skin is warm and dry.   Nursing note and vitals reviewed.      Significant Labs:  CBC:   Recent Labs   Lab 04/19/19  0539   WBC 12.17   RBC 3.56*   HGB 9.5*   HCT 28.2*   *   MCV 79*   MCH 26.7*   MCHC 33.7     CMP:   Recent Labs   Lab 04/19/19  0539      CALCIUM 9.9   ALBUMIN 2.6*   PROT 7.2      K 4.4   CO2 30*   CL 97   BUN 11   CREATININE 0.7   ALKPHOS 97   ALT 19   AST 63*   BILITOT 0.3       Significant Diagnostics:  I have reviewed all pertinent imaging results/findings within the past 24 hours.

## 2019-04-20 PROBLEM — R00.0 SINUS TACHYCARDIA: Status: ACTIVE | Noted: 2019-04-20

## 2019-04-20 LAB
ALBUMIN SERPL BCP-MCNC: 2.7 G/DL (ref 3.5–5.2)
ALP SERPL-CCNC: 104 U/L (ref 55–135)
ALT SERPL W/O P-5'-P-CCNC: 25 U/L (ref 10–44)
ANION GAP SERPL CALC-SCNC: 10 MMOL/L (ref 8–16)
AST SERPL-CCNC: 72 U/L (ref 10–40)
AV INDEX (PROSTH): 0.84
AV MEAN GRADIENT: 3.67 MMHG
AV PEAK GRADIENT: 5.38 MMHG
AV VALVE AREA: 2.54 CM2
AV VELOCITY RATIO: 0.79
BASOPHILS # BLD AUTO: 0.08 K/UL (ref 0–0.2)
BASOPHILS NFR BLD: 0.8 % (ref 0–1.9)
BILIRUB SERPL-MCNC: 0.2 MG/DL (ref 0.1–1)
BILIRUB UR QL STRIP: NEGATIVE
BLD PROD TYP BPU: NORMAL
BLD PROD TYP BPU: NORMAL
BLOOD UNIT EXPIRATION DATE: NORMAL
BLOOD UNIT EXPIRATION DATE: NORMAL
BLOOD UNIT TYPE CODE: 5100
BLOOD UNIT TYPE CODE: 5100
BLOOD UNIT TYPE: NORMAL
BLOOD UNIT TYPE: NORMAL
BSA FOR ECHO PROCEDURE: 1.79 M2
BUN SERPL-MCNC: 14 MG/DL (ref 6–20)
CALCIUM SERPL-MCNC: 9.6 MG/DL (ref 8.7–10.5)
CHLORIDE SERPL-SCNC: 95 MMOL/L (ref 95–110)
CLARITY UR REFRACT.AUTO: CLEAR
CO2 SERPL-SCNC: 30 MMOL/L (ref 23–29)
CODING SYSTEM: NORMAL
CODING SYSTEM: NORMAL
COLOR UR AUTO: YELLOW
CREAT SERPL-MCNC: 0.7 MG/DL (ref 0.5–1.4)
CV ECHO LV RWT: 0.8 CM
DIFFERENTIAL METHOD: ABNORMAL
DISPENSE STATUS: NORMAL
DISPENSE STATUS: NORMAL
DOP CALC AO PEAK VEL: 1.16 M/S
DOP CALC AO VTI: 15.43 CM
DOP CALC LVOT AREA: 3.02 CM2
DOP CALC LVOT DIAMETER: 1.96 CM
DOP CALC LVOT PEAK VEL: 0.92 M/S
DOP CALC LVOT STROKE VOLUME: 39.26 CM3
DOP CALCLVOT PEAK VEL VTI: 13.02 CM
E WAVE DECELERATION TIME: 127.26 MSEC
E/A RATIO: 0.78
E/E' RATIO: 7.14
ECHO LV POSTERIOR WALL: 0.99 CM (ref 0.6–1.1)
EOSINOPHIL # BLD AUTO: 0.5 K/UL (ref 0–0.5)
EOSINOPHIL NFR BLD: 5.1 % (ref 0–8)
ERYTHROCYTE [DISTWIDTH] IN BLOOD BY AUTOMATED COUNT: 14.5 % (ref 11.5–14.5)
EST. GFR  (AFRICAN AMERICAN): >60 ML/MIN/1.73 M^2
EST. GFR  (NON AFRICAN AMERICAN): >60 ML/MIN/1.73 M^2
FRACTIONAL SHORTENING: 30 % (ref 28–44)
GLUCOSE SERPL-MCNC: 104 MG/DL (ref 70–110)
GLUCOSE UR QL STRIP: NEGATIVE
HCT VFR BLD AUTO: 28.4 % (ref 40–54)
HGB BLD-MCNC: 9.6 G/DL (ref 14–18)
HGB UR QL STRIP: NEGATIVE
IMM GRANULOCYTES # BLD AUTO: 0.14 K/UL (ref 0–0.04)
IMM GRANULOCYTES NFR BLD AUTO: 1.4 % (ref 0–0.5)
INTERVENTRICULAR SEPTUM: 1.07 CM (ref 0.6–1.1)
IVRT: 0.09 MSEC
KETONES UR QL STRIP: NEGATIVE
LA MAJOR: 5.16 CM
LA MINOR: 5.19 CM
LA WIDTH: 3.02 CM
LEFT ATRIUM SIZE: 2.73 CM
LEFT ATRIUM VOLUME INDEX: 20.2 ML/M2
LEFT ATRIUM VOLUME: 36.27 CM3
LEFT INTERNAL DIMENSION IN SYSTOLE: 1.74 CM (ref 2.1–4)
LEFT VENTRICLE DIASTOLIC VOLUME INDEX: 12.29 ML/M2
LEFT VENTRICLE DIASTOLIC VOLUME: 22.1 ML
LEFT VENTRICLE MASS INDEX: 36.8 G/M2
LEFT VENTRICLE SYSTOLIC VOLUME INDEX: 4.9 ML/M2
LEFT VENTRICLE SYSTOLIC VOLUME: 8.85 ML
LEFT VENTRICULAR INTERNAL DIMENSION IN DIASTOLE: 2.49 CM (ref 3.5–6)
LEFT VENTRICULAR MASS: 66.13 G
LEUKOCYTE ESTERASE UR QL STRIP: NEGATIVE
LV LATERAL E/E' RATIO: 6.82
LV SEPTAL E/E' RATIO: 7.5
LYMPHOCYTES # BLD AUTO: 2.2 K/UL (ref 1–4.8)
LYMPHOCYTES NFR BLD: 21.6 % (ref 18–48)
MAGNESIUM SERPL-MCNC: 1.7 MG/DL (ref 1.6–2.6)
MCH RBC QN AUTO: 26.7 PG (ref 27–31)
MCHC RBC AUTO-ENTMCNC: 33.8 G/DL (ref 32–36)
MCV RBC AUTO: 79 FL (ref 82–98)
MONOCYTES # BLD AUTO: 0.7 K/UL (ref 0.3–1)
MONOCYTES NFR BLD: 6.9 % (ref 4–15)
MV PEAK A VEL: 0.96 M/S
MV PEAK E VEL: 0.75 M/S
NEUTROPHILS # BLD AUTO: 6.5 K/UL (ref 1.8–7.7)
NEUTROPHILS NFR BLD: 64.2 % (ref 38–73)
NITRITE UR QL STRIP: NEGATIVE
NRBC BLD-RTO: 0 /100 WBC
PH UR STRIP: 7 [PH] (ref 5–8)
PHOSPHATE SERPL-MCNC: 4.3 MG/DL (ref 2.7–4.5)
PISA TR MAX VEL: 2.19 M/S
PLATELET # BLD AUTO: 684 K/UL (ref 150–350)
PMV BLD AUTO: 9.5 FL (ref 9.2–12.9)
POTASSIUM SERPL-SCNC: 4.4 MMOL/L (ref 3.5–5.1)
PROCALCITONIN SERPL IA-MCNC: 0.07 NG/ML
PROT SERPL-MCNC: 7.4 G/DL (ref 6–8.4)
PROT UR QL STRIP: NEGATIVE
PULM VEIN S/D RATIO: 1.74
PV PEAK D VEL: 0.35 M/S
PV PEAK S VEL: 0.61 M/S
RA MAJOR: 4.51 CM
RA PRESSURE: 3 MMHG
RA WIDTH: 2.85 CM
RBC # BLD AUTO: 3.59 M/UL (ref 4.6–6.2)
RV TISSUE DOPPLER FREE WALL SYSTOLIC VELOCITY 1 (APICAL 4 CHAMBER VIEW): 13.03 M/S
SODIUM SERPL-SCNC: 135 MMOL/L (ref 136–145)
SP GR UR STRIP: 1.01 (ref 1–1.03)
TDI LATERAL: 0.11
TDI SEPTAL: 0.1
TDI: 0.11
TR MAX PG: 19.18 MMHG
TRANS ERYTHROCYTES VOL PATIENT: NORMAL ML
TRANS ERYTHROCYTES VOL PATIENT: NORMAL ML
TRICUSPID ANNULAR PLANE SYSTOLIC EXCURSION: 1.92 CM
TROPONIN I SERPL DL<=0.01 NG/ML-MCNC: 0.02 NG/ML (ref 0–0.03)
TROPONIN I SERPL DL<=0.01 NG/ML-MCNC: 0.02 NG/ML (ref 0–0.03)
TV REST PULMONARY ARTERY PRESSURE: 22 MMHG
URN SPEC COLLECT METH UR: NORMAL
WBC # BLD AUTO: 10.13 K/UL (ref 3.9–12.7)

## 2019-04-20 PROCEDURE — 36415 COLL VENOUS BLD VENIPUNCTURE: CPT

## 2019-04-20 PROCEDURE — 63600175 PHARM REV CODE 636 W HCPCS: Performed by: SURGERY

## 2019-04-20 PROCEDURE — 80053 COMPREHEN METABOLIC PANEL: CPT

## 2019-04-20 PROCEDURE — 99233 SBSQ HOSP IP/OBS HIGH 50: CPT | Mod: GC,,, | Performed by: INTERNAL MEDICINE

## 2019-04-20 PROCEDURE — 93005 ELECTROCARDIOGRAM TRACING: CPT

## 2019-04-20 PROCEDURE — 93010 EKG 12-LEAD: ICD-10-PCS | Mod: ,,, | Performed by: INTERNAL MEDICINE

## 2019-04-20 PROCEDURE — 87040 BLOOD CULTURE FOR BACTERIA: CPT

## 2019-04-20 PROCEDURE — 83735 ASSAY OF MAGNESIUM: CPT

## 2019-04-20 PROCEDURE — 99233 PR SUBSEQUENT HOSPITAL CARE,LEVL III: ICD-10-PCS | Mod: GC,,, | Performed by: INTERNAL MEDICINE

## 2019-04-20 PROCEDURE — 84484 ASSAY OF TROPONIN QUANT: CPT

## 2019-04-20 PROCEDURE — 25000003 PHARM REV CODE 250: Performed by: STUDENT IN AN ORGANIZED HEALTH CARE EDUCATION/TRAINING PROGRAM

## 2019-04-20 PROCEDURE — 81003 URINALYSIS AUTO W/O SCOPE: CPT

## 2019-04-20 PROCEDURE — 84100 ASSAY OF PHOSPHORUS: CPT

## 2019-04-20 PROCEDURE — 85025 COMPLETE CBC W/AUTO DIFF WBC: CPT

## 2019-04-20 PROCEDURE — 11000001 HC ACUTE MED/SURG PRIVATE ROOM

## 2019-04-20 PROCEDURE — 84145 PROCALCITONIN (PCT): CPT

## 2019-04-20 PROCEDURE — 93010 ELECTROCARDIOGRAM REPORT: CPT | Mod: ,,, | Performed by: INTERNAL MEDICINE

## 2019-04-20 PROCEDURE — 25000003 PHARM REV CODE 250: Performed by: SURGERY

## 2019-04-20 RX ORDER — LANOLIN ALCOHOL/MO/W.PET/CERES
400 CREAM (GRAM) TOPICAL ONCE
Status: COMPLETED | OUTPATIENT
Start: 2019-04-20 | End: 2019-04-20

## 2019-04-20 RX ADMIN — OXYCODONE HYDROCHLORIDE 10 MG: 10 TABLET ORAL at 11:04

## 2019-04-20 RX ADMIN — POLYETHYLENE GLYCOL 3350 17 G: 17 POWDER, FOR SOLUTION ORAL at 09:04

## 2019-04-20 RX ADMIN — CIPROFLOXACIN 400 MG: 2 INJECTION, SOLUTION INTRAVENOUS at 05:04

## 2019-04-20 RX ADMIN — OXYCODONE HYDROCHLORIDE 10 MG: 10 TABLET ORAL at 09:04

## 2019-04-20 RX ADMIN — OXYCODONE HYDROCHLORIDE 10 MG: 10 TABLET ORAL at 06:04

## 2019-04-20 RX ADMIN — OXYCODONE HYDROCHLORIDE 10 MG: 10 TABLET ORAL at 05:04

## 2019-04-20 RX ADMIN — SODIUM CHLORIDE, SODIUM LACTATE, POTASSIUM CHLORIDE, AND CALCIUM CHLORIDE 1000 ML: .6; .31; .03; .02 INJECTION, SOLUTION INTRAVENOUS at 06:04

## 2019-04-20 RX ADMIN — METOPROLOL SUCCINATE 50 MG: 50 TABLET, EXTENDED RELEASE ORAL at 09:04

## 2019-04-20 RX ADMIN — SENNOSIDES AND DOCUSATE SODIUM 1 TABLET: 8.6; 5 TABLET ORAL at 10:04

## 2019-04-20 RX ADMIN — MAGNESIUM OXIDE TAB 400 MG (241.3 MG ELEMENTAL MG) 400 MG: 400 (241.3 MG) TAB at 10:04

## 2019-04-20 RX ADMIN — PREGABALIN 225 MG: 150 CAPSULE ORAL at 09:04

## 2019-04-20 RX ADMIN — SODIUM CHLORIDE, SODIUM LACTATE, POTASSIUM CHLORIDE, AND CALCIUM CHLORIDE 1000 ML: .6; .31; .03; .02 INJECTION, SOLUTION INTRAVENOUS at 10:04

## 2019-04-20 RX ADMIN — OXYCODONE HYDROCHLORIDE 10 MG: 10 TABLET ORAL at 02:04

## 2019-04-20 NOTE — ASSESSMENT & PLAN NOTE
- Patient seen and examined  - Sinus tachycardia on EKG some TW flattening on inf leads no real inversion  - No prior cardiac hx but has many risk factors,   - Bedside TTE normal EF, no wall motion abnormalities, no effusion  - No CP, No SOB, Troponin     Recommendations  - Agree with IV Fluids  - Recommend complete infection work up, ID Consult, including procalcitonin, blood cultures, UA, CXR etc  -Ensure CBC stable  - Continue Metoprolol   - recommend ASA and Statin when ok with primary due to multiple risk factors  - This does not fit an ischemic picture would not treat for ACS  - Routine TTE

## 2019-04-20 NOTE — HPI
Briefly, 59 y.o. male with h/o HTN, arthritis, and cerebral palsy, tobacco abuse, PVD with previous aorto-bifem and L fem/pop. S/p emergent L AKA 3/20/19. Presents with fat necrosis L AKA stump and ischemic R foot rest pain. 3 Days Post-Op from L AKA washout/debridement with wound vac placement and Right AKA on 4/11/19. Left AKA with tracking muscle necrosis and purulence.    Cardiology consulted due to sinus tachycardia over the last 2 days, now in 130s. Patient has no cardiac history. Denies chest pain or SOB. He denies severe pain. No fevers. Hb relatively stable, WBC stable. L stump a little tender on L corner. Troponin pending  . Bedside TTE normal EF, no effusion, normal RV, IVC flat. EKG review sinus tach some flattening of T waves in inferior leads no real inversion, it is similar onesimo prior EKG yesterday.

## 2019-04-20 NOTE — CONSULTS
[facility]  Cardiology  Consult Note    Patient Name: Pranay Colindres  MRN: [id number]  Admission Date: 4/10/2019  Hospital Length of Stay: 10 days  Code Status: Full Code   Attending Provider: Thalia Moreno MD   Consulting Provider: Juanito Hayes MD  Primary Care Physician: Jenaro Torres MD  Principal Problem:Critical lower limb ischemia    Patient information was obtained from patient and ER records.     Inpatient consult to Cardiology  Consult performed by: Juanito Hayes MD  Consult ordered by: Camacho Pete MD        Subjective:     Chief Complaint:  Tachycardia     HPI:   Briefly, 59 y.o. male with h/o HTN, arthritis, and cerebral palsy, tobacco abuse, PVD with previous aorto-bifem and L fem/pop. S/p emergent L AKA 3/20/19. Presents with fat necrosis L AKA stump and ischemic R foot rest pain. 3 Days Post-Op from L AKA washout/debridement with wound vac placement and Right AKA on 4/11/19. Left AKA with tracking muscle necrosis and purulence.    Cardiology consulted due to sinus tachycardia over the last 2 days, now in 130s. Patient has no cardiac history. Denies chest pain or SOB. He denies severe pain. No fevers. Hb relatively stable, WBC stable. L stump a little tender on L corner. Troponin pending  . Bedside TTE normal EF, no effusion, normal RV, IVC flat. EKG review sinus tach some flattening of T waves in inferior leads no real inversion, it is similar onesimo prior EKG yesterday.           Past Medical History:   Diagnosis Date    Allergy     Arthritis     Hypertension     Neuropathy        Past Surgical History:   Procedure Laterality Date    ABDOMINAL SURGERY      AMPUTATION      right foot 5th digit    AMPUTATION, ABOVE KNEE Right 4/11/2019    [facility] 2ND FLR    AMPUTATION, ABOVE KNEE Left 3/20/2019    Performed by [facility]io [facility]mos [facility] at [facility] OR 2ND FLR    AMPUTATION, ABOVE KNEE left hip disarticulation Left  4/16/2019    [facility] 2ND [facility]LR    APPLICATION, WOUND VAC Left 4/11/2019    [facility] by [facility] at [facility] OR 2ND FLR    COLONOSCOPY  04/23/2018    COLONOSCOPY N/A 4/23/2018    [facility]    DEBRIDEMENT, WOUND Left 4/11/2019    Performed by [facility] at [facility] OR 2ND FLR    ESOPHAGOGASTRODUODENOSCOPY  04/23/2018    ESOPHAGOGASTRODUODENOSCOPY (EGD) N/A 4/23/2018    [facility] at [facility]    HIP SURGERY Bilateral     x2    REPLACEMENT, WOUND VAC  4/16/2019    Performed by [facility] at [facility] OR 2ND FLR       Review of patient's allergies indicates:  No Known Allergies    No current facility-administered medications on file prior to encounter.      Current Outpatient Medications on File Prior to Encounter   Medication Sig    amLODIPine (NORVASC) 10 MG tablet Take 1 tablet (10 mg total) by mouth once daily.    apixaban (ELIQUIS) 2.5 mg Tab Take 1 tablet (2.5 mg total) by mouth 2 (two) times daily.    aspirin (ECOTRIN) 81 MG EC tablet Take 1 tablet (81 mg total) by mouth once daily.    atorvastatin (LIPITOR) 40 MG tablet Take 1 tablet (40 mg total) by mouth once daily.    latanoprost 0.005 % ophthalmic solution     metoprolol succinate (TOPROL-XL) 50 MG 24 hr tablet Take 50 mg by mouth once daily.    pantoprazole (PROTONIX) 40 MG tablet     senna (SENOKOT) 8.6 mg tablet Take 1 tablet by mouth once daily.    gabapentin (NEURONTIN) 300 MG capsule Take 1 capsule (300 mg total) by mouth every evening.    HYDROcodone-acetaminophen (NORCO) 7.5-325 mg per tablet Take 1 tablet by mouth every 4 (four) hours as needed for Pain.    traMADol (ULTRAM) 50 mg tablet Take 1 tablet (50 mg total) by mouth every 6 (six) hours as needed for Pain.     Family History     None        Tobacco Use    Smoking status: Former Smoker     Types: Cigarettes     Last attempt to quit: 3/12/2019     Years  since quittin.1    Smokeless tobacco: Never Used   Substance and Sexual Activity    Alcohol use: Yes     Comment: 1-2    Drug use: No    Sexual activity: Not on file     Review of Systems   Constitution: Negative for chills, decreased appetite and diaphoresis.   HENT: Negative for congestion and ear discharge.    Eyes: Negative for blurred vision and discharge.   Cardiovascular: Negative for chest pain, dyspnea on exertion, irregular heartbeat, leg swelling and paroxysmal nocturnal dyspnea.   Respiratory: Negative for cough, hemoptysis and shortness of breath.    Gastrointestinal: Negative for abdominal pain.     Objective:     Vital Signs (Most Recent):  Temp: 98.2 °F (36.8 °C) (19 0910)  Pulse: (!) 135 (19)  Resp: 16 (19)  BP: 115/75 (1910)  SpO2: 100 % (19) Vital Signs (24h Range):  Temp:  [96.3 °F (35.7 °C)-98.2 °F (36.8 °C)] 98.2 °F (36.8 °C)  Pulse:  [100-135] 135  Resp:  [16-18] 16  SpO2:  [98 %-100 %] 100 %  BP: (115-160)/(73-93) 115/75     Weight: 67.1 kg (148 lb)  Body mass index is 22.5 kg/m².    SpO2: 100 %  O2 Device (Oxygen Therapy): room air      Intake/Output Summary (Last 24 hours) at 2019 1202  Last data filed at 2019 0745  Gross per 24 hour   Intake 960 ml   Output 1400 ml   Net -440 ml       Lines/Drains/Airways     Peripheral Intravenous Line                 Peripheral IV - Single Lumen 19 0330 22 G Anterior;Left Forearm 1 day                Physical Exam   Constitutional: He is oriented to person, place, and time. He appears well-developed and well-nourished. No distress.   HENT:   Head: Normocephalic and atraumatic.   Eyes: Pupils are equal, round, and reactive to light. Conjunctivae are normal.   Neck: No tracheal deviation present. No thyromegaly present.   Cardiovascular: Regular rhythm, normal heart sounds and intact distal pulses. Tachycardia present. Exam reveals no gallop and no friction rub.   No murmur  heard.  Bilateral AKA stump  L AKA stump with wound vac slight tenderness, wound covered, no purulence or drainage  R AKA C/D/I   Pulmonary/Chest: Effort normal and breath sounds normal. No respiratory distress. He has no wheezes. He has no rales.   Abdominal: Soft. Bowel sounds are normal. He exhibits no distension. There is no tenderness.   Musculoskeletal: He exhibits no edema or deformity.   Neurological: He is alert and oriented to person, place, and time. No cranial nerve deficit. Coordination normal.   Skin: Skin is warm and dry. He is not diaphoretic.   Psychiatric: He has a normal mood and affect. His behavior is normal.       Significant Labs:   BMP:   Recent Labs   Lab 04/19/19  0539 04/20/19  0511    104    135*   K 4.4 4.4   CL 97 95   CO2 30* 30*   BUN 11 14   CREATININE 0.7 0.7   CALCIUM 9.9 9.6   MG 1.8 1.7   , CMP   Recent Labs   Lab 04/19/19  0539 04/20/19  0511    135*   K 4.4 4.4   CL 97 95   CO2 30* 30*    104   BUN 11 14   CREATININE 0.7 0.7   CALCIUM 9.9 9.6   PROT 7.2 7.4   ALBUMIN 2.6* 2.7*   BILITOT 0.3 0.2   ALKPHOS 97 104   AST 63* 72*   ALT 19 25   ANIONGAP 11 10   ESTGFRAFRICA >60.0 >60.0   EGFRNONAA >60.0 >60.0   , CBC   Recent Labs   Lab 04/19/19  0539 04/20/19  0511   WBC 12.17 10.13   HGB 9.5* 9.6*   HCT 28.2* 28.4*   * 684*    and Lipid Panel No results for input(s): CHOL, HDL, LDLCALC, TRIG, CHOLHDL in the last 48 hours.    Significant Imaging: Echocardiogram: 2D echo with color flow doppler: No results found for this or any previous visit.    Assessment and Plan:     Sinus tachycardia  - Patient seen and examined  - Sinus tachycardia on EKG some TW flattening on inf leads no real inversion  - No prior cardiac hx but has many risk factors,   - Bedside TTE normal EF, no wall motion abnormalities, no effusion  - No CP, No SOB, Troponin     Recommendations  - Agree with IV Fluids  - Recommend complete infection work up, ID Consult, including  procalcitonin, blood cultures, UA, CXR etc  -Ensure CBC stable  - Continue Metoprolol   - recommend ASA and Statin when ok with primary due to multiple risk factors  - This does not fit an ischemic picture would not treat for ACS  - Routine TTE        VTE Risk Mitigation (From admission, onward)        Ordered     IP VTE LOW RISK PATIENT  Once      04/10/19 1445     Place sequential compression device  Until discontinued      04/10/19 1445          Thank you for your consult. I will follow-up with patient. Please contact us if you have any additional questions.    Juanito Hayes MD  Cardiology   [facility]

## 2019-04-20 NOTE — SUBJECTIVE & OBJECTIVE
Past Medical History:   Diagnosis Date    Allergy     Arthritis     Hypertension     Neuropathy        Past Surgical History:   Procedure Laterality Date    ABDOMINAL SURGERY      AMPUTATION      right foot 5th digit    AMPUTATION, ABOVE KNEE Right 4/11/2019    [facility] OR 2ND FLR    AMPUTATION, ABOVE KNEE Left 3/20/2019    Performed by [facility]ligio Olmos [facility] at [facility] OR 2ND FLR    AMPUTATION, ABOVE KNEE left hip disarticulation Left 4/16/2019    [facility]    APPLICATION, WOUND VAC Left 4/11/2019    [facility]    COLONOSCOPY  04/23/2018    COLONOSCOPY N/A 4/23/2018    [facility] ENDO    DEBRIDEMENT, WOUND Left 4/11/2019    Performed by [facility]alia [facility]no MD at [facility] OR [address] [place] [address]    ESOPHAGOGASTRODUODENOSCOPY  04/23/2018    ESOPHAGOGASTRODUODENOSCOPY (EGD) N/A 4/23/2018    Per[facility] by [facility] at [facility] ENDO    HIP SURGERY Bilateral     x2    REPLACEMENT, WOUND VAC  4/16/2019    Performed by [facility]alia [facility]no MD at [facility] OR [address] [place] [address]       Review of patient's allergies indicates:  No Known Allergies    No current facility-administered medications on file prior to encounter.      Current Outpatient Medications on File Prior to Encounter   Medication Sig    amLODIPine (NORVASC) 10 MG tablet Take 1 tablet (10 mg total) by mouth once daily.    apixaban (ELIQUIS) 2.5 mg Tab Take 1 tablet (2.5 mg total) by mouth 2 (two) times daily.    aspirin (ECOTRIN) 81 MG EC tablet Take 1 tablet (81 mg total) by mouth once daily.    atorvastatin (LIPITOR) 40 MG tablet Take 1 tablet (40 mg total) by mouth once daily.    latanoprost 0.005 % ophthalmic solution     metoprolol succinate (TOPROL-XL) 50 MG 24 hr tablet Take 50 mg by mouth once daily.    pantoprazole (PROTONIX) 40 MG tablet     senna (SENOKOT) 8.6 mg tablet Take 1 tablet by mouth once daily.     gabapentin (NEURONTIN) 300 MG capsule Take 1 capsule (300 mg total) by mouth every evening.    HYDROcodone-acetaminophen (NORCO) 7.5-325 mg per tablet Take 1 tablet by mouth every 4 (four) hours as needed for Pain.    traMADol (ULTRAM) 50 mg tablet Take 1 tablet (50 mg total) by mouth every 6 (six) hours as needed for Pain.     Family History     None        Tobacco Use    Smoking status: Former Smoker     Types: Cigarettes     Last attempt to quit: 3/12/2019     Years since quittin.1    Smokeless tobacco: Never Used   Substance and Sexual Activity    Alcohol use: Yes     Comment: 1-2    Drug use: No    Sexual activity: Not on file     Review of Systems   Constitution: Negative for chills, decreased appetite and diaphoresis.   HENT: Negative for congestion and ear discharge.    Eyes: Negative for blurred vision and discharge.   Cardiovascular: Negative for chest pain, dyspnea on exertion, irregular heartbeat, leg swelling and paroxysmal nocturnal dyspnea.   Respiratory: Negative for cough, hemoptysis and shortness of breath.    Gastrointestinal: Negative for abdominal pain.     Objective:     Vital Signs (Most Recent):  Temp: 98.2 °F (36.8 °C) (19)  Pulse: (!) 135 (1910)  Resp: 16 (19)  BP: 115/75 (1910)  SpO2: 100 % (19) Vital Signs (24h Range):  Temp:  [96.3 °F (35.7 °C)-98.2 °F (36.8 °C)] 98.2 °F (36.8 °C)  Pulse:  [100-135] 135  Resp:  [16-18] 16  SpO2:  [98 %-100 %] 100 %  BP: (115-160)/(73-93) 115/75     Weight: 67.1 kg (148 lb)  Body mass index is 22.5 kg/m².    SpO2: 100 %  O2 Device (Oxygen Therapy): room air      Intake/Output Summary (Last 24 hours) at 2019 1202  Last data filed at 2019 0745  Gross per 24 hour   Intake 960 ml   Output 1400 ml   Net -440 ml       Lines/Drains/Airways     Peripheral Intravenous Line                 Peripheral IV - Single Lumen 19 0330 22 G Anterior;Left Forearm 1 day                Physical Exam    Constitutional: He is oriented to person, place, and time. He appears well-developed and well-nourished. No distress.   HENT:   Head: Normocephalic and atraumatic.   Eyes: Pupils are equal, round, and reactive to light. Conjunctivae are normal.   Neck: No tracheal deviation present. No thyromegaly present.   Cardiovascular: Regular rhythm, normal heart sounds and intact distal pulses. Tachycardia present. Exam reveals no gallop and no friction rub.   No murmur heard.  Bilateral AKA stump  L AKA stump with wound vac slight tenderness, wound covered, no purulence or drainage  R AKA C/D/I   Pulmonary/Chest: Effort normal and breath sounds normal. No respiratory distress. He has no wheezes. He has no rales.   Abdominal: Soft. Bowel sounds are normal. He exhibits no distension. There is no tenderness.   Musculoskeletal: He exhibits no edema or deformity.   Neurological: He is alert and oriented to person, place, and time. No cranial nerve deficit. Coordination normal.   Skin: Skin is warm and dry. He is not diaphoretic.   Psychiatric: He has a normal mood and affect. His behavior is normal.       Significant Labs:   BMP:   Recent Labs   Lab 04/19/19  0539 04/20/19  0511    104    135*   K 4.4 4.4   CL 97 95   CO2 30* 30*   BUN 11 14   CREATININE 0.7 0.7   CALCIUM 9.9 9.6   MG 1.8 1.7   , CMP   Recent Labs   Lab 04/19/19  0539 04/20/19  0511    135*   K 4.4 4.4   CL 97 95   CO2 30* 30*    104   BUN 11 14   CREATININE 0.7 0.7   CALCIUM 9.9 9.6   PROT 7.2 7.4   ALBUMIN 2.6* 2.7*   BILITOT 0.3 0.2   ALKPHOS 97 104   AST 63* 72*   ALT 19 25   ANIONGAP 11 10   ESTGFRAFRICA >60.0 >60.0   EGFRNONAA >60.0 >60.0   , CBC   Recent Labs   Lab 04/19/19  0539 04/20/19  0511   WBC 12.17 10.13   HGB 9.5* 9.6*   HCT 28.2* 28.4*   * 684*    and Lipid Panel No results for input(s): CHOL, HDL, LDLCALC, TRIG, CHOLHDL in the last 48 hours.    Significant Imaging: Echocardiogram: 2D echo with color flow  doppler: No results found for this or any previous visit.

## 2019-04-20 NOTE — ASSESSMENT & PLAN NOTE
59 y.o. male with h/o HTN, arthritis, and cerebral palsy, tobacco abuse, PVD with previous aorto-bifem and L fem/pop. S/p emergent L AKA 3/20/19. Presents with fat necrosis L AKA stump and ischemic R foot rest pain. 3 Days Post-Op from L AKA washout/debridement with wound vac placement and Right AKA on 4/11/19. Left AKA with tracking muscle necrosis and purulence.     - Regular diet  - IV Cipro while in house, will send out on PO cipro to complete total 14 days of ABx  - PRN pain control   - EKG ordered to evaluate tachycardia, will also give 1bolus LR   - wound vac changed today -- continue to change 2x/week      Dispo: Medically stable for discharge, pending placement

## 2019-04-20 NOTE — PLAN OF CARE
Problem: Adult Inpatient Plan of Care  Goal: Plan of Care Review  Outcome: Ongoing (interventions implemented as appropriate)  Quiet hours. Slept well. Med x2 for c/o pain with relief. Wound vac remains in use. VSS. Safety maintained.

## 2019-04-20 NOTE — SUBJECTIVE & OBJECTIVE
Interval History:  Tachycardia into the 130's this AM, EKG ordered   HR in the last 24 hrs 100-117  C/o some pain L AKA stump   Wound vac changed at bedside this morning.     Medications:  Continuous Infusions:  Scheduled Meds:   ciprofloxacin  400 mg Intravenous Q12H    metoprolol succinate  50 mg Oral Daily    polyethylene glycol  17 g Oral Daily    pregabalin  225 mg Oral QHS    senna-docusate 8.6-50 mg  1 tablet Oral Daily     PRN Meds:bisacodyl, ondansetron, oxyCODONE, oxyCODONE, promethazine (PHENERGAN) IVPB, sodium chloride 0.9%, sodium chloride 0.9%, zolpidem     Objective:     Vital Signs (Most Recent):  Temp: 98.2 °F (36.8 °C) (04/20/19 0910)  Pulse: (!) 135 (04/20/19 0910)  Resp: 16 (04/20/19 0910)  BP: 115/75 (04/20/19 0910)  SpO2: 100 % (04/20/19 0910) Vital Signs (24h Range):  Temp:  [96.3 °F (35.7 °C)-98.2 °F (36.8 °C)] 98.2 °F (36.8 °C)  Pulse:  [100-135] 135  Resp:  [16-18] 16  SpO2:  [98 %-100 %] 100 %  BP: (115-160)/(73-93) 115/75         Physical Exam   Constitutional: He is oriented to person, place, and time. He appears well-developed and well-nourished.   Neck: No JVD present. No tracheal deviation present.   Cardiovascular: Normal rate and regular rhythm.   Pulmonary/Chest: Effort normal. No respiratory distress.   Abdominal: Soft. He exhibits no distension. There is no tenderness. There is no guarding.   Musculoskeletal: He exhibits no edema.   L AKA with wound vac, minimal edema, pink and healthy appearing tissue   R AKA incision c/d/i    Neurological: He is alert and oriented to person, place, and time.   Skin: Skin is warm and dry.   Nursing note and vitals reviewed.      Significant Labs:  CBC:   Recent Labs   Lab 04/20/19  0511   WBC 10.13   RBC 3.59*   HGB 9.6*   HCT 28.4*   *   MCV 79*   MCH 26.7*   MCHC 33.8     CMP:   Recent Labs   Lab 04/20/19  0511      CALCIUM 9.6   ALBUMIN 2.7*   PROT 7.4   *   K 4.4   CO2 30*   CL 95   BUN 14   CREATININE 0.7   ALKPHOS  104   ALT 25   AST 72*   BILITOT 0.2       Significant Diagnostics:  I have reviewed all pertinent imaging results/findings within the past 24 hours.

## 2019-04-20 NOTE — PROGRESS NOTES
[facility]  Vascular Surgery   Progress Note    Subjective:     History of Present Illness:  No notes on file    Post-Op Info:  Procedure(s) (LRB):  AMPUTATION, ABOVE KNEE left hip disarticulation (Left)  REPLACEMENT, WOUND VAC   4 Days Post-Op     Interval History:  Tachycardia into the 130's this AM, EKG ordered   HR in the last 24 hrs 100-117  C/o some pain L AKA stump   Wound vac changed at bedside this morning.     Medications:  Continuous Infusions:  Scheduled Meds:   ciprofloxacin  400 mg Intravenous Q12H    metoprolol succinate  50 mg Oral Daily    polyethylene glycol  17 g Oral Daily    pregabalin  225 mg Oral QHS    senna-docusate 8.6-50 mg  1 tablet Oral Daily     PRN Meds:bisacodyl, ondansetron, oxyCODONE, oxyCODONE, promethazine (PHENERGAN) IVPB, sodium chloride 0.9%, sodium chloride 0.9%, zolpidem     Objective:     Vital Signs (Most Recent):  Temp: 98.2 °F (36.8 °C) (04/20/19 0910)  Pulse: (!) 135 (04/20/19 0910)  Resp: 16 (04/20/19 0910)  BP: 115/75 (04/20/19 0910)  SpO2: 100 % (04/20/19 0910) Vital Signs (24h Range):  Temp:  [96.3 °F (35.7 °C)-98.2 °F (36.8 °C)] 98.2 °F (36.8 °C)  Pulse:  [100-135] 135  Resp:  [16-18] 16  SpO2:  [98 %-100 %] 100 %  BP: (115-160)/(73-93) 115/75         Physical Exam   Constitutional: He is oriented to person, place, and time. He appears well-developed and well-nourished.   Neck: No JVD present. No tracheal deviation present.   Cardiovascular: Normal rate and regular rhythm.   Pulmonary/Chest: Effort normal. No respiratory distress.   Abdominal: Soft. He exhibits no distension. There is no tenderness. There is no guarding.   Musculoskeletal: He exhibits no edema.   L AKA with wound vac, minimal edema, pink and healthy appearing tissue   R AKA incision c/d/i    Neurological: He is alert and oriented to person, place, and time.   Skin: Skin is warm and dry.   Nursing note and vitals reviewed.      Significant Labs:  CBC:   Recent Labs   Lab  04/20/19  0511   WBC 10.13   RBC 3.59*   HGB 9.6*   HCT 28.4*   *   MCV 79*   MCH 26.7*   MCHC 33.8     CMP:   Recent Labs   Lab 04/20/19  0511      CALCIUM 9.6   ALBUMIN 2.7*   PROT 7.4   *   K 4.4   CO2 30*   CL 95   BUN 14   CREATININE 0.7   ALKPHOS 104   ALT 25   AST 72*   BILITOT 0.2       Significant Diagnostics:  I have reviewed all pertinent imaging results/findings within the past 24 hours.    Assessment/Plan:     * Critical lower limb ischemia  59 y.o. male with h/o HTN, arthritis, and cerebral palsy, tobacco abuse, PVD with previous aorto-bifem and L fem/pop. S/p emergent L AKA 3/20/19. Presents with fat necrosis L AKA stump and ischemic R foot rest pain. 3 Days Post-Op from L AKA washout/debridement with wound vac placement and Right AKA on 4/11/19. Left AKA with tracking muscle necrosis and purulence.     - Regular diet  - IV Cipro while in house, will send out on PO cipro to complete total 14 days of ABx  - PRN pain control   - EKG ordered to evaluate tachycardia, will also give 1bolus LR   - wound vac changed today -- continue to change 2x/week      Dispo: pending placement             Zakia Lovett MD  Plastic Surgery  [facility]

## 2019-04-21 LAB
ALBUMIN SERPL BCP-MCNC: 2.6 G/DL (ref 3.5–5.2)
ALP SERPL-CCNC: 113 U/L (ref 55–135)
ALT SERPL W/O P-5'-P-CCNC: 32 U/L (ref 10–44)
ANION GAP SERPL CALC-SCNC: 8 MMOL/L (ref 8–16)
AST SERPL-CCNC: 78 U/L (ref 10–40)
BASOPHILS # BLD AUTO: 0.08 K/UL (ref 0–0.2)
BASOPHILS NFR BLD: 0.7 % (ref 0–1.9)
BILIRUB SERPL-MCNC: 0.2 MG/DL (ref 0.1–1)
BUN SERPL-MCNC: 12 MG/DL (ref 6–20)
CALCIUM SERPL-MCNC: 9.6 MG/DL (ref 8.7–10.5)
CHLORIDE SERPL-SCNC: 100 MMOL/L (ref 95–110)
CO2 SERPL-SCNC: 29 MMOL/L (ref 23–29)
CREAT SERPL-MCNC: 0.7 MG/DL (ref 0.5–1.4)
DIFFERENTIAL METHOD: ABNORMAL
EOSINOPHIL # BLD AUTO: 0.5 K/UL (ref 0–0.5)
EOSINOPHIL NFR BLD: 4.6 % (ref 0–8)
ERYTHROCYTE [DISTWIDTH] IN BLOOD BY AUTOMATED COUNT: 14.5 % (ref 11.5–14.5)
EST. GFR  (AFRICAN AMERICAN): >60 ML/MIN/1.73 M^2
EST. GFR  (NON AFRICAN AMERICAN): >60 ML/MIN/1.73 M^2
GLUCOSE SERPL-MCNC: 135 MG/DL (ref 70–110)
HCT VFR BLD AUTO: 27.3 % (ref 40–54)
HGB BLD-MCNC: 9.3 G/DL (ref 14–18)
IMM GRANULOCYTES # BLD AUTO: 0.12 K/UL (ref 0–0.04)
IMM GRANULOCYTES NFR BLD AUTO: 1.1 % (ref 0–0.5)
LYMPHOCYTES # BLD AUTO: 2.7 K/UL (ref 1–4.8)
LYMPHOCYTES NFR BLD: 24.2 % (ref 18–48)
MAGNESIUM SERPL-MCNC: 1.9 MG/DL (ref 1.6–2.6)
MCH RBC QN AUTO: 26.6 PG (ref 27–31)
MCHC RBC AUTO-ENTMCNC: 34.1 G/DL (ref 32–36)
MCV RBC AUTO: 78 FL (ref 82–98)
MONOCYTES # BLD AUTO: 0.8 K/UL (ref 0.3–1)
MONOCYTES NFR BLD: 7.3 % (ref 4–15)
NEUTROPHILS # BLD AUTO: 7 K/UL (ref 1.8–7.7)
NEUTROPHILS NFR BLD: 62.1 % (ref 38–73)
NRBC BLD-RTO: 0 /100 WBC
PHOSPHATE SERPL-MCNC: 4.4 MG/DL (ref 2.7–4.5)
PLATELET # BLD AUTO: 640 K/UL (ref 150–350)
PMV BLD AUTO: 9.5 FL (ref 9.2–12.9)
POTASSIUM SERPL-SCNC: 4.3 MMOL/L (ref 3.5–5.1)
PROT SERPL-MCNC: 7 G/DL (ref 6–8.4)
RBC # BLD AUTO: 3.5 M/UL (ref 4.6–6.2)
SODIUM SERPL-SCNC: 137 MMOL/L (ref 136–145)
WBC # BLD AUTO: 11.22 K/UL (ref 3.9–12.7)

## 2019-04-21 PROCEDURE — 25000003 PHARM REV CODE 250: Performed by: STUDENT IN AN ORGANIZED HEALTH CARE EDUCATION/TRAINING PROGRAM

## 2019-04-21 PROCEDURE — 25000003 PHARM REV CODE 250: Performed by: SURGERY

## 2019-04-21 PROCEDURE — 85025 COMPLETE CBC W/AUTO DIFF WBC: CPT

## 2019-04-21 PROCEDURE — 84100 ASSAY OF PHOSPHORUS: CPT

## 2019-04-21 PROCEDURE — 36415 COLL VENOUS BLD VENIPUNCTURE: CPT

## 2019-04-21 PROCEDURE — 80053 COMPREHEN METABOLIC PANEL: CPT

## 2019-04-21 PROCEDURE — 97110 THERAPEUTIC EXERCISES: CPT

## 2019-04-21 PROCEDURE — 86592 SYPHILIS TEST NON-TREP QUAL: CPT

## 2019-04-21 PROCEDURE — 99222 1ST HOSP IP/OBS MODERATE 55: CPT | Mod: ,,, | Performed by: INTERNAL MEDICINE

## 2019-04-21 PROCEDURE — 97530 THERAPEUTIC ACTIVITIES: CPT

## 2019-04-21 PROCEDURE — 99231 PR SUBSEQUENT HOSPITAL CARE,LEVL I: ICD-10-PCS | Mod: GC,,, | Performed by: INTERNAL MEDICINE

## 2019-04-21 PROCEDURE — 11000001 HC ACUTE MED/SURG PRIVATE ROOM

## 2019-04-21 PROCEDURE — 83735 ASSAY OF MAGNESIUM: CPT

## 2019-04-21 PROCEDURE — 86703 HIV-1/HIV-2 1 RESULT ANTBDY: CPT

## 2019-04-21 PROCEDURE — 99231 SBSQ HOSP IP/OBS SF/LOW 25: CPT | Mod: GC,,, | Performed by: INTERNAL MEDICINE

## 2019-04-21 PROCEDURE — 99222 PR INITIAL HOSPITAL CARE,LEVL II: ICD-10-PCS | Mod: ,,, | Performed by: INTERNAL MEDICINE

## 2019-04-21 PROCEDURE — 63600175 PHARM REV CODE 636 W HCPCS: Performed by: SURGERY

## 2019-04-21 RX ADMIN — OXYCODONE HYDROCHLORIDE 10 MG: 10 TABLET ORAL at 06:04

## 2019-04-21 RX ADMIN — PREGABALIN 225 MG: 150 CAPSULE ORAL at 08:04

## 2019-04-21 RX ADMIN — OXYCODONE HYDROCHLORIDE 10 MG: 10 TABLET ORAL at 02:04

## 2019-04-21 RX ADMIN — SENNOSIDES AND DOCUSATE SODIUM 1 TABLET: 8.6; 5 TABLET ORAL at 09:04

## 2019-04-21 RX ADMIN — METOPROLOL SUCCINATE 50 MG: 50 TABLET, EXTENDED RELEASE ORAL at 09:04

## 2019-04-21 RX ADMIN — OXYCODONE HYDROCHLORIDE 10 MG: 10 TABLET ORAL at 10:04

## 2019-04-21 RX ADMIN — CIPROFLOXACIN 400 MG: 2 INJECTION, SOLUTION INTRAVENOUS at 05:04

## 2019-04-21 RX ADMIN — SODIUM CHLORIDE, SODIUM LACTATE, POTASSIUM CHLORIDE, AND CALCIUM CHLORIDE 1000 ML: .6; .31; .03; .02 INJECTION, SOLUTION INTRAVENOUS at 10:04

## 2019-04-21 RX ADMIN — OXYCODONE HYDROCHLORIDE 10 MG: 10 TABLET ORAL at 05:04

## 2019-04-21 RX ADMIN — POLYETHYLENE GLYCOL 3350 17 G: 17 POWDER, FOR SOLUTION ORAL at 09:04

## 2019-04-21 RX ADMIN — OXYCODONE HYDROCHLORIDE 10 MG: 10 TABLET ORAL at 09:04

## 2019-04-21 NOTE — SUBJECTIVE & OBJECTIVE
Interval History:  HR  this AM, asymptomatic and reports feeling well  Seen by cardiology yesterday. TTE negative for cardiac abnormality.   Infectious work up negative so far. ID to see patient today.      Medications:  Continuous Infusions:  Scheduled Meds:   ciprofloxacin  400 mg Intravenous Q12H    metoprolol succinate  50 mg Oral Daily    polyethylene glycol  17 g Oral Daily    pregabalin  225 mg Oral QHS    senna-docusate 8.6-50 mg  1 tablet Oral Daily     PRN Meds:bisacodyl, ondansetron, oxyCODONE, oxyCODONE, promethazine (PHENERGAN) IVPB, sodium chloride 0.9%, sodium chloride 0.9%, zolpidem     Objective:     Vital Signs (Most Recent):  Temp: 96.2 °F (35.7 °C) (04/21/19 0800)  Pulse: (!) 124 (04/21/19 0826)  Resp: 18 (04/21/19 0800)  BP: 126/84 (04/21/19 0800)  SpO2: 100 % (04/21/19 0800) Vital Signs (24h Range):  Temp:  [96.2 °F (35.7 °C)-98.7 °F (37.1 °C)] 96.2 °F (35.7 °C)  Pulse:  [] 124  Resp:  [16-18] 18  SpO2:  [96 %-100 %] 100 %  BP: (115-126)/(75-84) 126/84         Physical Exam   Constitutional: He is oriented to person, place, and time. He appears well-developed and well-nourished.   Neck: No JVD present. No tracheal deviation present.   Cardiovascular: Normal rate and regular rhythm.   Pulmonary/Chest: Effort normal. No respiratory distress.   Abdominal: Soft. He exhibits no distension. There is no tenderness. There is no guarding.   Musculoskeletal: He exhibits no edema.   L AKA with wound vac, minimal edema, pink and healthy appearing tissue   R AKA incision c/d/i    Neurological: He is alert and oriented to person, place, and time.   Skin: Skin is warm and dry.   Nursing note and vitals reviewed.      Significant Labs:  CBC:   Recent Labs   Lab 04/21/19  0419   WBC 11.22   RBC 3.50*   HGB 9.3*   HCT 27.3*   *   MCV 78*   MCH 26.6*   MCHC 34.1     CMP:   Recent Labs   Lab 04/21/19  0419   *   CALCIUM 9.6   ALBUMIN 2.6*   PROT 7.0      K 4.3   CO2 29       BUN 12   CREATININE 0.7   ALKPHOS 113   ALT 32   AST 78*   BILITOT 0.2       Significant Diagnostics:  I have reviewed all pertinent imaging results/findings within the past 24 hours.

## 2019-04-21 NOTE — ASSESSMENT & PLAN NOTE
59 y.o. male with h/o HTN, arthritis, and cerebral palsy, tobacco abuse, PVD with previous aorto-bifem and L fem/pop. S/p emergent L AKA 3/20/19. Presents with fat necrosis L AKA stump and ischemic R foot rest pain. 3 Days Post-Op from L AKA washout/debridement with wound vac placement and Right AKA on 4/11/19. Left AKA with tracking muscle necrosis and purulence.     - Regular diet  - IV Cipro while in house, will send out on PO cipro to complete total 14 days of ABx  - PRN pain control   - will continue to give fluids   - Cardiology consulted, work up negative for cardiac etiology for tachycardia, recs per cardiology:         - continue Metoprolol          - ASA and Statin when ok with primary due to multiple risk factors  - ID to see patient today to r/o infectious etiology, Bcx NTD   - wound vac changes 2x/week, last changed 4/20/19     Dispo: likely DC early this week

## 2019-04-21 NOTE — PROGRESS NOTES
[facility]  Plastic Surgery  Progress Note    Subjective:     History of Present Illness:  No notes on file    Post-Op Info:  Procedure(s) (LRB):  AMPUTATION, ABOVE KNEE left hip disarticulation (Left)  REPLACEMENT, WOUND VAC   5 Days Post-Op     Interval History:  HR  this AM, asymptomatic and reports feeling well  Seen by cardiology yesterday. TTE negative for cardiac abnormality.   Infectious work up negative so far. ID to see patient today.      Medications:  Continuous Infusions:  Scheduled Meds:   ciprofloxacin  400 mg Intravenous Q12H    metoprolol succinate  50 mg Oral Daily    polyethylene glycol  17 g Oral Daily    pregabalin  225 mg Oral QHS    senna-docusate 8.6-50 mg  1 tablet Oral Daily     PRN Meds:bisacodyl, ondansetron, oxyCODONE, oxyCODONE, promethazine (PHENERGAN) IVPB, sodium chloride 0.9%, sodium chloride 0.9%, zolpidem     Objective:     Vital Signs (Most Recent):  Temp: 96.2 °F (35.7 °C) (04/21/19 0800)  Pulse: (!) 124 (04/21/19 0826)  Resp: 18 (04/21/19 0800)  BP: 126/84 (04/21/19 0800)  SpO2: 100 % (04/21/19 0800) Vital Signs (24h Range):  Temp:  [96.2 °F (35.7 °C)-98.7 °F (37.1 °C)] 96.2 °F (35.7 °C)  Pulse:  [] 124  Resp:  [16-18] 18  SpO2:  [96 %-100 %] 100 %  BP: (115-126)/(75-84) 126/84         Physical Exam   Constitutional: He is oriented to person, place, and time. He appears well-developed and well-nourished.   Neck: No JVD present. No tracheal deviation present.   Cardiovascular: Normal rate and regular rhythm.   Pulmonary/Chest: Effort normal. No respiratory distress.   Abdominal: Soft. He exhibits no distension. There is no tenderness. There is no guarding.   Musculoskeletal: He exhibits no edema.   L AKA with wound vac, minimal edema, pink and healthy appearing tissue   R AKA incision c/d/i    Neurological: He is alert and oriented to person, place, and time.   Skin: Skin is warm and dry.   Nursing note and vitals reviewed.      Significant  Labs:  CBC:   Recent Labs   Lab 04/21/19  0419   WBC 11.22   RBC 3.50*   HGB 9.3*   HCT 27.3*   *   MCV 78*   MCH 26.6*   MCHC 34.1     CMP:   Recent Labs   Lab 04/21/19  0419   *   CALCIUM 9.6   ALBUMIN 2.6*   PROT 7.0      K 4.3   CO2 29      BUN 12   CREATININE 0.7   ALKPHOS 113   ALT 32   AST 78*   BILITOT 0.2       Significant Diagnostics:  I have reviewed all pertinent imaging results/findings within the past 24 hours.    Assessment/Plan:     * Critical lower limb ischemia  59 y.o. male with h/o HTN, arthritis, and cerebral palsy, tobacco abuse, PVD with previous aorto-bifem and L fem/pop. S/p emergent L AKA 3/20/19. Presents with fat necrosis L AKA stump and ischemic R foot rest pain. 3 Days Post-Op from L AKA washout/debridement with wound vac placement and Right AKA on 4/11/19. Left AKA with tracking muscle necrosis and purulence.     - Regular diet  - IV Cipro while in house, will send out on PO cipro to complete total 14 days of ABx  - PRN pain control   - will continue to give fluids   - Cardiology consulted, work up negative for cardiac etiology for tachycardia, recs per cardiology:         - continue Metoprolol          - ASA and Statin when ok with primary due to multiple risk factors  - ID to see patient today to r/o infectious etiology, Bcx NTD   - wound vac changes 2x/week, last changed 4/20/19     Dispo: likely DC early this week             Zakia Lovett MD  Plastic Surgery  [facility]

## 2019-04-21 NOTE — PT/OT/SLP PROGRESS
Occupational Therapy   Treatment    Name: Pranay Colindres  MRN: [id number]  Admitting Diagnosis:  Critical lower limb ischemia  5 Days Post-Op    Recommendations:     Discharge Recommendations: rehabilitation facility  Discharge Equipment Recommendations:  commode  Barriers to discharge:  None    Assessment:     Pranay Colindres is a 59 y.o. male with a medical diagnosis of Critical lower limb ischemia.  He was able to perform supine/sit T/F c min A and sit/supine T/F c max A.  Tolerated sitting up on EOB for approx. 20 min. CGA-min A.  Able to perform dynamic reaching activities c CGA/min A 2* poor static sitting balance.  Tolerated B UE exercises well.  Able to don hospital gown c mod A.  Pt is progressing well.  Performance deficits affecting function are weakness, impaired endurance, impaired self care skills, impaired functional mobilty, decreased upper extremity function.     Rehab Prognosis:  Good; patient would benefit from acute skilled OT services to address these deficits and reach maximum level of function.       Plan:     Patient to be seen 4 x/week to address the above listed problems via self-care/home management, therapeutic activities, therapeutic exercises  · Plan of Care Expires: 05/17/19  · Plan of Care Reviewed with: patient    Subjective     Pain/Comfort:  · Pain Rating 1: 0/10    Objective:     Communicated with: RN prior to session.  Patient found supine with peripheral IV, wound vac upon OT entry to room.    General Precautions: Standard, fall   Orthopedic Precautions:N/A   Braces: N/A     Occupational Performance:     Bed Mobility:    · Patient completed Supine to Sit with minimum assistance  · Patient completed Sit to Supine with moderate assistance         Activities of Daily Living:  · Upper Body Dressing: moderate assistance to don hospital gown.      [facility] 6 Click ADL: 16    Treatment & Education:  Pt was able to perform dynamic reaching activities 2x10 while sitting up on EOB c min  A/CGA and tolerated well.  Required cues to return to midline.  Able to perform W/C push-ups 1x15 while sitting up on EOB and tolerated well.    Patient left supine with all lines intact, call button in reach and RN notifiedEducation:      GOALS:   Multidisciplinary Problems     Occupational Therapy Goals        Problem: Occupational Therapy Goal    Goal Priority Disciplines Outcome Interventions   Occupational Therapy Goal     OT, PT/OT Ongoing (interventions implemented as appropriate)    Description:  Re-Eval performed on 4/18/2019  Goals to be met by: 4/25/2019     Patient will increase functional independence with ADLs by performing:    UE Dressing with Contact Guard Assistance.  LE Dressing with Moderate Assistance.  Grooming while sitting up in w/c with Modified [place].  Toileting from bedside commode with Minimal Assistance for hygiene and clothing management.   Supine to sit with Contact Guard Assistance.  Toilet transfer to bedside commode via scooting technique with Moderate Assistance.                       Time Tracking:     OT Date of Treatment: 04/21/19  OT Start Time: 0715  OT Stop Time: 0748  OT Total Time (min): 33 min    Billable Minutes:Therapeutic Activity 17  Therapeutic Exercise 16    SAMY Saunders  4/21/2019

## 2019-04-21 NOTE — PROGRESS NOTES
[facility]  Cardiology  Progress Note    Patient Name: Pranay Colindres  MRN: [id number]  Admission Date: 4/10/2019  Hospital Length of Stay: 11 days  Code Status: Full Code   Attending Physician: Thalia Moreno MD   Primary Care Physician: Jenaro Torres MD  Expected Discharge Date: 4/22/2019  Principal Problem:Critical lower limb ischemia    Subjective:     Interval History: Patient reports feeling well today, pain is well controlled. Denies chest pain, dyspnea, palpitations or irregular heart beat. He reports no symptoms or concerns at this time.    Review of Systems   Constitution: Negative.   HENT: Negative.    Eyes: Negative.    Cardiovascular: Negative.    Respiratory: Negative.    Endocrine: Negative.    Hematologic/Lymphatic: Negative.    Skin: Negative.    Gastrointestinal: Negative.    Genitourinary: Negative.    Neurological: Negative.    Psychiatric/Behavioral: Negative.      Objective:     Vital Signs (Most Recent):  Temp: 98 °F (36.7 °C) (04/21/19 0436)  Pulse: 89 (04/21/19 0436)  Resp: 18 (04/21/19 0436)  BP: 118/77 (04/21/19 0436)  SpO2: 100 % (04/21/19 0436) Vital Signs (24h Range):  Temp:  [97.7 °F (36.5 °C)-98.7 °F (37.1 °C)] 98 °F (36.7 °C)  Pulse:  [] 89  Resp:  [16-18] 18  SpO2:  [96 %-100 %] 100 %  BP: (115-121)/(75-83) 118/77     Weight: 67.1 kg (148 lb)  Body mass index is 22.5 kg/m².     SpO2: 100 %  O2 Device (Oxygen Therapy): room air      Intake/Output Summary (Last 24 hours) at 4/21/2019 0725  Last data filed at 4/20/2019 1800  Gross per 24 hour   Intake 840 ml   Output 400 ml   Net 440 ml       Lines/Drains/Airways     Peripheral Intravenous Line                 Peripheral IV - Single Lumen 04/20/19 1400 22 G Left;Posterior Forearm less than 1 day                Physical Exam   Constitutional: He is oriented to person, place, and time. He appears well-developed and well-nourished. No distress.   HENT:   Head: Normocephalic and atraumatic.   Neck: No JVD  present.   Cardiovascular: Normal rate, regular rhythm, normal heart sounds and intact distal pulses. Exam reveals no gallop and no friction rub.   No murmur heard.  Pulmonary/Chest: Effort normal and breath sounds normal. No respiratory distress. He has no wheezes. He has no rales.   Abdominal: Soft. Bowel sounds are normal. He exhibits no distension. There is no tenderness.   Musculoskeletal: He exhibits no edema.   S/p bilateral AKA   Neurological: He is alert and oriented to person, place, and time.   Skin: He is not diaphoretic.       Significant Labs:     Recent Results (from the past 24 hour(s))   Troponin I    Collection Time: 04/20/19 11:39 AM   Result Value Ref Range    Troponin I 0.017 0.000 - 0.026 ng/mL   Troponin I    Collection Time: 04/20/19 11:39 AM   Result Value Ref Range    Troponin I 0.017 0.000 - 0.026 ng/mL   Blood culture    Collection Time: 04/20/19  2:27 PM   Result Value Ref Range    Blood Culture, Routine No Growth to date    Blood culture    Collection Time: 04/20/19  2:27 PM   Result Value Ref Range    Blood Culture, Routine No Growth to date    Procalcitonin    Collection Time: 04/20/19  2:27 PM   Result Value Ref Range    Procalcitonin 0.07 <0.25 ng/mL   Transthoracic echo (TTE) complete (Cupid Only)    Collection Time: 04/20/19  2:30 PM   Result Value Ref Range    AV mean gradient 3.67 mmHg    Ao peak kiko 1.16 m/s    Ao VTI 15.43 cm    IVRT 0.09 msec    IVS 1.07 0.6 - 1.1 cm    LA size 2.73 cm    Left Atrium Major Axis 5.16 cm    Left Atrium Minor Axis 5.19 cm    LVIDD 2.49 (A) 3.5 - 6.0 cm    LVIDS 1.74 (A) 2.1 - 4.0 cm    LVOT diameter 1.96 cm    LVOT peak VTI 13.02 cm    PW 0.99 0.6 - 1.1 cm    MV Peak A Kiko 0.96 m/s    E wave decelartion time 127.26 msec    MV Peak E Kiko 0.75 m/s    PV Peak D Kiko 0.35 m/s    PV Peak S Kiko 0.61 m/s    RA Major Axis 4.51 cm    RA Width 2.85 cm    TAPSE 1.92 cm    TR Max Kiko 2.19 m/s    TDI LATERAL 0.11     TDI SEPTAL 0.10     LA WIDTH 3.02 cm    LV  Diastolic Volume 22.10 mL    LV Systolic Volume 8.85 mL    RV S' 13.03 m/s    LVOT peak caleb [id number] m/s    LV LATERAL E/E' RATIO 6.82     LV SEPTAL E/E' RATIO 7.50     FS 30 %    LA volume 36.27 cm3    LV mass 66.13 g    Left Ventricle Relative Wall Thickness 0.80 cm    AV valve area 2.54 cm2    AV Velocity Ratio 0.79     AV index (prosthetic) 0.84     E/A ratio 0.78     Mean e' 0.11     Pulm vein S/D ratio 1.74     LVOT area 3.02 cm2    LVOT stroke volume 39.26 cm3    AV peak gradient 5.38 mmHg    E/E' ratio 7.14     LV Systolic Volume Index 4.9 mL/m2    LV Diastolic Volume Index 12.29 mL/m2    LA Volume Index 20.2 mL/m2    LV Mass Index 36.8 g/m2    Triscuspid Valve Regurgitation Peak Gradient 19.18 mmHg    BSA 1.79 m2    Right Atrial Pressure (from IVC) 3 mmHg    TV rest pulmonary artery pressure 22 mmHg   Urinalysis, Reflex to Urine Culture Urine, Clean Catch    Collection Time: 04/20/19  3:45 PM   Result Value Ref Range    Specimen UA Urine, Clean Catch     Color, UA Yellow Yellow, Straw, Renee    Appearance, UA Clear Clear    pH, UA 7.0 5.0 - 8.0    Specific Gravity, UA 1.015 1.005 - 1.030    Protein, UA Negative Negative    Glucose, UA Negative Negative    Ketones, UA Negative Negative    Bilirubin (UA) Negative Negative    Occult Blood UA Negative Negative    Nitrite, UA Negative Negative    Leukocytes, UA Negative Negative   CBC auto differential    Collection Time: 04/21/19  4:19 AM   Result Value Ref Range    WBC 11.22 3.90 - 12.70 K/uL    RBC 3.50 (L) 4.60 - 6.20 M/uL    Hemoglobin 9.3 (L) 14.0 - 18.0 g/dL    Hematocrit 27.3 (L) 40.0 - 54.0 %    MCV 78 (L) 82 - 98 fL    MCH 26.6 (L) 27.0 - 31.0 pg    MCHC 34.1 32.0 - 36.0 g/dL    RDW 14.5 11.5 - 14.5 %    Platelets 640 (H) 150 - 350 K/uL    MPV 9.5 9.2 - 12.9 fL    Immature Granulocytes 1.1 (H) 0.0 - 0.5 %    Gran # (ANC) 7.0 1.8 - 7.7 K/uL    Immature Grans (Abs) 0.12 (H) 0.00 - 0.04 K/uL    Lymph # 2.7 1.0 - 4.8 K/uL    Mono # 0.8 0.3 - 1.0  K/uL    Eos # 0.5 0.0 - 0.5 K/uL    Baso # 0.08 0.00 - 0.20 K/uL    nRBC 0 0 /100 WBC    Gran% 62.1 38.0 - 73.0 %    Lymph% 24.2 18.0 - 48.0 %    Mono% 7.3 4.0 - 15.0 %    Eosinophil% 4.6 0.0 - 8.0 %    Basophil% 0.7 0.0 - 1.9 %    Differential Method Automated    Comprehensive metabolic panel    Collection Time: 04/21/19  4:19 AM   Result Value Ref Range    Sodium 137 136 - 145 mmol/L    Potassium 4.3 3.5 - 5.1 mmol/L    Chloride 100 95 - 110 mmol/L    CO2 29 23 - 29 mmol/L    Glucose 135 (H) 70 - 110 mg/dL    BUN, Bld 12 6 - 20 mg/dL    Creatinine 0.7 0.5 - 1.4 mg/dL    Calcium 9.6 8.7 - 10.5 mg/dL    Total Protein 7.0 6.0 - 8.4 g/dL    Albumin 2.6 (L) 3.5 - 5.2 g/dL    Total Bilirubin 0.2 0.1 - 1.0 mg/dL    Alkaline Phosphatase 113 55 - 135 U/L    AST 78 (H) 10 - 40 U/L    ALT 32 10 - 44 U/L    Anion Gap 8 8 - 16 mmol/L    eGFR if African American >60.0 >60 mL/min/1.73 m^2    eGFR if non African American >60.0 >60 mL/min/1.73 m^2   Magnesium    Collection Time: 04/21/19  4:19 AM   Result Value Ref Range    Magnesium 1.9 1.6 - 2.6 mg/dL   Phosphorus    Collection Time: 04/21/19  4:19 AM   Result Value Ref Range    Phosphorus 4.4 2.7 - 4.5 mg/dL         Significant Imaging:     I have reviewed all pertinent imaging studies from the last 24 hours      Assessment and Plan:       Sinus tachycardia  Sinus tachycardia has resolved, patient is in NSR (90's)  Sinus tachycardia on EKG nonspecific ST changes in inferior leads  No prior cardiac hx but has many risk factors  2D echo with EF 60%, no wall motion abnormalities  Asymptomatic, serial troponins negative  Evaluate and treat underlying causes of sinus tachycardia  Continue Metoprolol   Recommend ASA and Statin when ok with primary due to multiple risk factors        VTE Risk Mitigation (From admission, onward)        Ordered     IP VTE LOW RISK PATIENT  Once      04/10/19 1445     Place sequential compression device  Until discontinued      04/10/19 1445           Pietro Miranda MD  Cardiology  [facility]

## 2019-04-21 NOTE — PLAN OF CARE
Problem: Occupational Therapy Goal  Goal: Occupational Therapy Goal  Re-Eval performed on 4/18/2019  Goals to be met by: 4/25/2019     Patient will increase functional independence with ADLs by performing:    UE Dressing with Contact Guard Assistance.  LE Dressing with Moderate Assistance.  Grooming while sitting up in w/c with Modified [place].  Toileting from bedside commode with Minimal Assistance for hygiene and clothing management.   Supine to sit with Contact Guard Assistance.  Toilet transfer to bedside commode via scooting technique with Moderate Assistance.      Cont. POC.

## 2019-04-21 NOTE — ASSESSMENT & PLAN NOTE
Patient has no localizing symptoms of infections besides the known infection of his left AKA stump. UA and CXR not concerning for infection. Procal is normal    -low suspicion for occult infection  -continue cipro for a 14 day course as planned  -would order HIV and RPR  -ID will sign off

## 2019-04-21 NOTE — HPI
58 yo male with PMH of HTN, arthritis, and cerebral palsy, tobacco abuse, PVD with previous aorto-bifem and L fem/pop. S/p emergent L AKA 3/20/19. Presents with fat necrosis L AKA stump and ischemic R foot rest pain. Had L AKA washout/debridement with wound vac placement and Right AKA on 4/11/19.       ID is consulted for to rule out infectious cause of tachycardia. Had proteus growing from left stump wound. On cipro. Procalcitonin is normal

## 2019-04-21 NOTE — SUBJECTIVE & OBJECTIVE
Past Medical History:   Diagnosis Date    Allergy     Arthritis     Hypertension     Neuropathy        Past Surgical History:   Procedure Laterality Date    ABDOMINAL SURGERY      AMPUTATION      right foot 5th digit    AMPUTATION, ABOVE KNEE Right 4/11/2019    Performed by [facility] at [facility] OR 2ND FLR    AMPUTATION, ABOVE KNEE Left 3/20/2019    Performed by [facility]io [facility]mlos [facility] at [facility]    AMPUTATION, ABOVE KNEE left hip disarticulation Left 4/16/2019    Performed by [facility] at [facility]    APPLICATION, WOUND VAC Left 4/11/2019    Performed by [facility] at [facility] OR [address] [place] [address]    COLONOSCOPY  04/23/2018    COLONOSCOPY N/A 4/23/2018    Performed by [facility]an [facility] at [facility] ENDO    DEBRIDEMENT, WOUND Left 4/11/2019    Performed by [facility] at [facility] OR [address] [place] [address]    ESOPHAGOGASTRODUODENOSCOPY  04/23/2018    ESOPHAGOGASTRODUODENOSCOPY (EGD) N/A 4/23/2018    Performed by [facility]an [facility] at [facility] ENDO    HIP SURGERY Bilateral     x2    REPLACEMENT, WOUND VAC  4/16/2019    Performed by [facility]no MD at [facility] OR [address] [place] [address]       Review of patient's allergies indicates:  No Known Allergies    Medications:  Medications Prior to Admission   Medication Sig    amLODIPine (NORVASC) 10 MG tablet Take 1 tablet (10 mg total) by mouth once daily.    apixaban (ELIQUIS) 2.5 mg Tab Take 1 tablet (2.5 mg total) by mouth 2 (two) times daily.    aspirin (ECOTRIN) 81 MG EC tablet Take 1 tablet (81 mg total) by mouth once daily.    atorvastatin (LIPITOR) 40 MG tablet Take 1 tablet (40 mg total) by mouth once daily.    latanoprost 0.005 % ophthalmic solution     metoprolol succinate (TOPROL-XL) 50 MG 24 hr tablet Take 50 mg by mouth once daily.    pantoprazole (PROTONIX) 40 MG tablet     senna (SENOKOT) 8.6 mg tablet Take 1 tablet by mouth once daily.    gabapentin (NEURONTIN) 300 MG capsule Take 1 capsule (300 mg total) by mouth every evening.     HYDROcodone-acetaminophen (NORCO) 7.5-325 mg per tablet Take 1 tablet by mouth every 4 (four) hours as needed for Pain.    traMADol (ULTRAM) 50 mg tablet Take 1 tablet (50 mg total) by mouth every 6 (six) hours as needed for Pain.     Antibiotics (From admission, onward)    Start     Stop Route Frequency Ordered    19 1745  ciprofloxacin (CIPRO)400mg/200ml D5W IVPB 400 mg      -- IV Every 12 hours (non-standard times) 19 1642        Antifungals (From admission, onward)    None        Antivirals (From admission, onward)    None           Immunization History   Administered Date(s) Administered    PPD Test 2019       Family History     None        Social History     Socioeconomic History    Marital status: Single     Spouse name: Not on file    Number of children: Not on file    Years of education: Not on file    Highest education level: Not on file   Occupational History    Not on file   Social Needs    Financial resource strain: Not on file    Food insecurity:     Worry: Not on file     Inability: Not on file    Transportation needs:     Medical: Not on file     Non-medical: Not on file   Tobacco Use    Smoking status: Former Smoker     Types: Cigarettes     Last attempt to quit: 3/12/2019     Years since quittin.1    Smokeless tobacco: Never Used   Substance and Sexual Activity    Alcohol use: Yes     Comment: 1-2    Drug use: No    Sexual activity: Not on file   Lifestyle    Physical activity:     Days per week: Not on file     Minutes per session: Not on file    Stress: Not on file   Relationships    Social connections:     Talks on phone: Not on file     Gets together: Not on file     Attends Oriental orthodox service: Not on file     Active member of club or organization: Not on file     Attends meetings of clubs or organizations: Not on file     Relationship status: Not on file   Other Topics Concern    Not on file   Social History Narrative    Not on file     Review of  Systems   Constitutional: Negative.    HENT: Negative.    Eyes: Negative.    Respiratory: Negative.    Cardiovascular: Negative.    Gastrointestinal: Negative.    Endocrine: Negative.    Genitourinary: Negative.    Musculoskeletal:        Pain at left AKA stump   Skin: Negative.    Allergic/Immunologic: Negative.    Neurological: Negative.      Objective:     Vital Signs (Most Recent):  Temp: 98.3 °F (36.8 °C) (04/21/19 1156)  Pulse: 91 (04/21/19 1200)  Resp: 16 (04/21/19 1156)  BP: 118/79 (04/21/19 1156)  SpO2: 100 % (04/21/19 1156) Vital Signs (24h Range):  Temp:  [96.2 °F (35.7 °C)-98.3 °F (36.8 °C)] 98.3 °F (36.8 °C)  Pulse:  [] 91  Resp:  [16-18] 16  SpO2:  [96 %-100 %] 100 %  BP: (117-126)/(77-84) 118/79     Weight: 67.1 kg (148 lb)  Body mass index is 22.5 kg/m².    Estimated Creatinine Clearance: 107.8 mL/min (based on SCr of 0.7 mg/dL).    Physical Exam   Constitutional: He is oriented to person, place, and time. He appears well-developed and well-nourished.   Neck: No JVD present. No tracheal deviation present.   Cardiovascular: Normal rate and regular rhythm.   Pulmonary/Chest: Effort normal. No respiratory distress.   Abdominal: Soft. He exhibits no distension. There is no tenderness. There is no guarding.   Musculoskeletal: He exhibits no edema.   L AKA with wound vac, minimal edema, pink and healthy appearing tissue   R AKA incision c/d/i    Neurological: He is alert and oriented to person, place, and time.   Skin: Skin is warm and dry.   Nursing note and vitals reviewed.      Significant Labs: All pertinent labs within the past 24 hours have been reviewed.    Significant Imaging: I have reviewed all pertinent imaging results/findings within the past 24 hours.

## 2019-04-21 NOTE — CONSULTS
[facility]  Infectious Disease  Consult Note    Patient Name: Pranay Colindres  MRN: [id number]  Admission Date: 4/10/2019  Hospital Length of Stay: 11 days  Attending Physician: Thalia Moreno MD  Primary Care Provider: Jenaro Torres MD     Isolation Status: No active isolations    Patient information was obtained from patient and past medical records.      Inpatient consult to Infectious Diseases  Consult performed by: El Acuña MD  Consult ordered by: Zakia Lovett MD  Reason for consult: rule out infection        Assessment/Plan:     Sinus tachycardia  Patient has no localizing symptoms of infections besides the known infection of his left AKA stump. UA and CXR not concerning for infection. Procal is normal    -low suspicion for occult infection  -continue cipro for a 14 day course as planned  -would order HIV and RPR  -ID will sign off        Thank you for your consult. I will sign off. Please contact us if you have any additional questions.    El Acuña MD  Infectious Disease  [facility]    Subjective:     Principal Problem: Critical lower limb ischemia    HPI: 60 yo male with PMH of HTN, arthritis, and cerebral palsy, tobacco abuse, PVD with previous aorto-bifem and L fem/pop. S/p emergent L AKA 3/20/19. Presents with fat necrosis L AKA stump and ischemic R foot rest pain. Had L AKA washout/debridement with wound vac placement and Right AKA on 4/11/19.       ID is consulted for to rule out infectious cause of tachycardia. Had proteus growing from left stump wound. On cipro. Procalcitonin is normal        Past Medical History:   Diagnosis Date    Allergy     Arthritis     Hypertension     Neuropathy        Past Surgical History:   Procedure Laterality Date    ABDOMINAL SURGERY      AMPUTATION      right foot 5th digit    AMPUTATION, ABOVE KNEE Right 4/11/2019    [facility] 2ND FLR    AMPUTATION, ABOVE KNEE Left  3/20/2019    Performed by Eligio Olmos MD at [facility] OR 2ND FLR    AMPUTATION, ABOVE KNEE left hip disarticulation Left 4/16/2019    Performed by [facility] at [facility] OR [facility] FLR    APPLICATION, WOUND VAC Left 4/11/2019    [facility]    COLONOSCOPY  04/23/2018    COLONOSCOPY N/A 4/23/2018    Performed by Jeramy Castelan MD at [facility] ENDO    DEBRIDEMENT, WOUND Left 4/11/2019    Performed by Thalia [facility]no MD at [facility] OR [facility] FLR    ESOPHAGOGASTRODUODENOSCOPY  04/23/2018    ESOPHAGOGASTRODUODENOSCOPY (EGD) N/A 4/23/2018    Performed by Jeramy Castelan MD at [facility] ENDO    HIP SURGERY Bilateral     x2    REPLACEMENT, WOUND VAC  4/16/2019    Performed by Thalia Moreno MD at [facility] OR [address] [place] [address]       Review of patient's allergies indicates:  No Known Allergies    Medications:  Medications Prior to Admission   Medication Sig    amLODIPine (NORVASC) 10 MG tablet Take 1 tablet (10 mg total) by mouth once daily.    apixaban (ELIQUIS) 2.5 mg Tab Take 1 tablet (2.5 mg total) by mouth 2 (two) times daily.    aspirin (ECOTRIN) 81 MG EC tablet Take 1 tablet (81 mg total) by mouth once daily.    atorvastatin (LIPITOR) 40 MG tablet Take 1 tablet (40 mg total) by mouth once daily.    latanoprost 0.005 % ophthalmic solution     metoprolol succinate (TOPROL-XL) 50 MG 24 hr tablet Take 50 mg by mouth once daily.    pantoprazole (PROTONIX) 40 MG tablet     senna (SENOKOT) 8.6 mg tablet Take 1 tablet by mouth once daily.    gabapentin (NEURONTIN) 300 MG capsule Take 1 capsule (300 mg total) by mouth every evening.    HYDROcodone-acetaminophen (NORCO) 7.5-325 mg per tablet Take 1 tablet by mouth every 4 (four) hours as needed for Pain.    traMADol (ULTRAM) 50 mg tablet Take 1 tablet (50 mg total) by mouth every 6 (six) hours as needed for Pain.     Antibiotics (From admission, onward)    Start     Stop Route Frequency Ordered    04/12/19 5339  ciprofloxacin  (CIPRO)400mg/200ml D5W IVPB 400 mg      -- IV Every 12 hours (non-standard times) 19 1642        Antifungals (From admission, onward)    None        Antivirals (From admission, onward)    None           Immunization History   Administered Date(s) Administered    PPD Test 2019       Family History     None        Social History     Socioeconomic History    Marital status: Single     Spouse name: Not on file    Number of children: Not on file    Years of education: Not on file    Highest education level: Not on file   Occupational History    Not on file   Social Needs    Financial resource strain: Not on file    Food insecurity:     Worry: Not on file     Inability: Not on file    Transportation needs:     Medical: Not on file     Non-medical: Not on file   Tobacco Use    Smoking status: Former Smoker     Types: Cigarettes     Last attempt to quit: 3/12/2019     Years since quittin.1    Smokeless tobacco: Never Used   Substance and Sexual Activity    Alcohol use: Yes     Comment: 1-2    Drug use: No    Sexual activity: Not on file   Lifestyle    Physical activity:     Days per week: Not on file     Minutes per session: Not on file    Stress: Not on file   Relationships    Social connections:     Talks on phone: Not on file     Gets together: Not on file     Attends Rastafarian service: Not on file     Active member of club or organization: Not on file     Attends meetings of clubs or organizations: Not on file     Relationship status: Not on file   Other Topics Concern    Not on file   Social History Narrative    Not on file     Review of Systems   Constitutional: Negative.    HENT: Negative.    Eyes: Negative.    Respiratory: Negative.    Cardiovascular: Negative.    Gastrointestinal: Negative.    Endocrine: Negative.    Genitourinary: Negative.    Musculoskeletal:        Pain at left AKA stump   Skin: Negative.    Allergic/Immunologic: Negative.    Neurological: Negative.       Objective:     Vital Signs (Most Recent):  Temp: 98.3 °F (36.8 °C) (04/21/19 1156)  Pulse: 91 (04/21/19 1200)  Resp: 16 (04/21/19 1156)  BP: 118/79 (04/21/19 1156)  SpO2: 100 % (04/21/19 1156) Vital Signs (24h Range):  Temp:  [96.2 °F (35.7 °C)-98.3 °F (36.8 °C)] 98.3 °F (36.8 °C)  Pulse:  [] 91  Resp:  [16-18] 16  SpO2:  [96 %-100 %] 100 %  BP: (117-126)/(77-84) 118/79     Weight: 67.1 kg (148 lb)  Body mass index is 22.5 kg/m².    Estimated Creatinine Clearance: 107.8 mL/min (based on SCr of 0.7 mg/dL).    Physical Exam   Constitutional: He is oriented to person, place, and time. He appears well-developed and well-nourished.   Neck: No JVD present. No tracheal deviation present.   Cardiovascular: Normal rate and regular rhythm.   Pulmonary/Chest: Effort normal. No respiratory distress.   Abdominal: Soft. He exhibits no distension. There is no tenderness. There is no guarding.   Musculoskeletal: He exhibits no edema.   L AKA with wound vac, minimal edema, pink and healthy appearing tissue   R AKA incision c/d/i    Neurological: He is alert and oriented to person, place, and time.   Skin: Skin is warm and dry.   Nursing note and vitals reviewed.      Significant Labs: All pertinent labs within the past 24 hours have been reviewed.    Significant Imaging: I have reviewed all pertinent imaging results/findings within the past 24 hours.

## 2019-04-21 NOTE — SUBJECTIVE & OBJECTIVE
Interval History: Patient reports feeling well today, pain is well controlled. Denies chest pain, dyspnea, palpitations or irregular heart beat. He reports no symptoms or concerns at this time.    Review of Systems   Constitution: Negative.   HENT: Negative.    Eyes: Negative.    Cardiovascular: Negative.    Respiratory: Negative.    Endocrine: Negative.    Hematologic/Lymphatic: Negative.    Skin: Negative.    Gastrointestinal: Negative.    Genitourinary: Negative.    Neurological: Negative.    Psychiatric/Behavioral: Negative.      Objective:     Vital Signs (Most Recent):  Temp: 98 °F (36.7 °C) (04/21/19 0436)  Pulse: 89 (04/21/19 0436)  Resp: 18 (04/21/19 0436)  BP: 118/77 (04/21/19 0436)  SpO2: 100 % (04/21/19 0436) Vital Signs (24h Range):  Temp:  [97.7 °F (36.5 °C)-98.7 °F (37.1 °C)] 98 °F (36.7 °C)  Pulse:  [] 89  Resp:  [16-18] 18  SpO2:  [96 %-100 %] 100 %  BP: (115-121)/(75-83) 118/77     Weight: 67.1 kg (148 lb)  Body mass index is 22.5 kg/m².     SpO2: 100 %  O2 Device (Oxygen Therapy): room air      Intake/Output Summary (Last 24 hours) at 4/21/2019 0725  Last data filed at 4/20/2019 1800  Gross per 24 hour   Intake 840 ml   Output 400 ml   Net 440 ml       Lines/Drains/Airways     Peripheral Intravenous Line                 Peripheral IV - Single Lumen 04/20/19 1400 22 G Left;Posterior Forearm less than 1 day                Physical Exam   Constitutional: He is oriented to person, place, and time. He appears well-developed and well-nourished. No distress.   HENT:   Head: Normocephalic and atraumatic.   Neck: No JVD present.   Cardiovascular: Normal rate, regular rhythm, normal heart sounds and intact distal pulses. Exam reveals no gallop and no friction rub.   No murmur heard.  Pulmonary/Chest: Effort normal and breath sounds normal. No respiratory distress. He has no wheezes. He has no rales.   Abdominal: Soft. Bowel sounds are normal. He exhibits no distension. There is no tenderness.    Musculoskeletal: He exhibits no edema.   S/p bilateral AKA   Neurological: He is alert and oriented to person, place, and time.   Skin: He is not diaphoretic.       Significant Labs:     Recent Results (from the past 24 hour(s))   Troponin I    Collection Time: 04/20/19 11:39 AM   Result Value Ref Range    Troponin I 0.017 0.000 - 0.026 ng/mL   Troponin I    Collection Time: 04/20/19 11:39 AM   Result Value Ref Range    Troponin I 0.017 0.000 - 0.026 ng/mL   Blood culture    Collection Time: 04/20/19  2:27 PM   Result Value Ref Range    Blood Culture, Routine No Growth to date    Blood culture    Collection Time: 04/20/19  2:27 PM   Result Value Ref Range    Blood Culture, Routine No Growth to date    Procalcitonin    Collection Time: 04/20/19  2:27 PM   Result Value Ref Range    Procalcitonin 0.07 <0.25 ng/mL   Transthoracic echo (TTE) complete (Cupid Only)    Collection Time: 04/20/19  2:30 PM   Result Value Ref Range    AV mean gradient 3.67 mmHg    Ao peak kiko 1.16 m/s    Ao VTI 15.43 cm    IVRT 0.09 msec    IVS 1.07 0.6 - 1.1 cm    LA size 2.73 cm    Left Atrium Major Axis 5.16 cm    Left Atrium Minor Axis 5.19 cm    LVIDD 2.49 (A) 3.5 - 6.0 cm    LVIDS 1.74 (A) 2.1 - 4.0 cm    LVOT diameter 1.96 cm    LVOT peak VTI 13.02 cm    PW 0.99 0.6 - 1.1 cm    MV Peak A Kiko 0.96 m/s    E wave decelartion time 127.26 msec    MV Peak E Kiko 0.75 m/s    PV Peak D Kiko 0.35 m/s    PV Peak S Kiko 0.61 m/s    RA Major Axis 4.51 cm    RA Width 2.85 cm    TAPSE 1.92 cm    TR Max Kiko 2.19 m/s    TDI LATERAL 0.11     TDI SEPTAL 0.10     LA WIDTH 3.02 cm    LV Diastolic Volume 22.10 mL    LV Systolic Volume 8.85 mL    RV S' 13.03 m/s    LVOT peak kiko [id number] m/s    LV LATERAL E/E' RATIO 6.82     LV SEPTAL E/E' RATIO 7.50     FS 30 %    LA volume 36.27 cm3    LV mass 66.13 g    Left Ventricle Relative Wall Thickness 0.80 cm    AV valve area 2.54 cm2    AV Velocity Ratio 0.79     AV index (prosthetic) 0.84     E/A ratio  0.78     Mean e' 0.11     Pulm vein S/D ratio 1.74     LVOT area 3.02 cm2    LVOT stroke volume 39.26 cm3    AV peak gradient 5.38 mmHg    E/E' ratio 7.14     LV Systolic Volume Index 4.9 mL/m2    LV Diastolic Volume Index 12.29 mL/m2    LA Volume Index 20.2 mL/m2    LV Mass Index 36.8 g/m2    Triscuspid Valve Regurgitation Peak Gradient 19.18 mmHg    BSA 1.79 m2    Right Atrial Pressure (from IVC) 3 mmHg    TV rest pulmonary artery pressure 22 mmHg   Urinalysis, Reflex to Urine Culture Urine, Clean Catch    Collection Time: 04/20/19  3:45 PM   Result Value Ref Range    Specimen UA Urine, Clean Catch     Color, UA Yellow Yellow, Straw, Renee    Appearance, UA Clear Clear    pH, UA 7.0 5.0 - 8.0    Specific Gravity, UA 1.015 1.005 - 1.030    Protein, UA Negative Negative    Glucose, UA Negative Negative    Ketones, UA Negative Negative    Bilirubin (UA) Negative Negative    Occult Blood UA Negative Negative    Nitrite, UA Negative Negative    Leukocytes, UA Negative Negative   CBC auto differential    Collection Time: 04/21/19  4:19 AM   Result Value Ref Range    WBC 11.22 3.90 - 12.70 K/uL    RBC 3.50 (L) 4.60 - 6.20 M/uL    Hemoglobin 9.3 (L) 14.0 - 18.0 g/dL    Hematocrit 27.3 (L) 40.0 - 54.0 %    MCV 78 (L) 82 - 98 fL    MCH 26.6 (L) 27.0 - 31.0 pg    MCHC 34.1 32.0 - 36.0 g/dL    RDW 14.5 11.5 - 14.5 %    Platelets 640 (H) 150 - 350 K/uL    MPV 9.5 9.2 - 12.9 fL    Immature Granulocytes 1.1 (H) 0.0 - 0.5 %    Gran # (ANC) 7.0 1.8 - 7.7 K/uL    Immature Grans (Abs) 0.12 (H) 0.00 - 0.04 K/uL    Lymph # 2.7 1.0 - 4.8 K/uL    Mono # 0.8 0.3 - 1.0 K/uL    Eos # 0.5 0.0 - 0.5 K/uL    Baso # 0.08 0.00 - 0.20 K/uL    nRBC 0 0 /100 WBC    Gran% 62.1 38.0 - 73.0 %    Lymph% 24.2 18.0 - 48.0 %    Mono% 7.3 4.0 - 15.0 %    Eosinophil% 4.6 0.0 - 8.0 %    Basophil% 0.7 0.0 - 1.9 %    Differential Method Automated    Comprehensive metabolic panel    Collection Time: 04/21/19  4:19 AM   Result Value Ref Range    Sodium 137 136  - 145 mmol/L    Potassium 4.3 3.5 - 5.1 mmol/L    Chloride 100 95 - 110 mmol/L    CO2 29 23 - 29 mmol/L    Glucose 135 (H) 70 - 110 mg/dL    BUN, Bld 12 6 - 20 mg/dL    Creatinine 0.7 0.5 - 1.4 mg/dL    Calcium 9.6 8.7 - 10.5 mg/dL    Total Protein 7.0 6.0 - 8.4 g/dL    Albumin 2.6 (L) 3.5 - 5.2 g/dL    Total Bilirubin 0.2 0.1 - 1.0 mg/dL    Alkaline Phosphatase 113 55 - 135 U/L    AST 78 (H) 10 - 40 U/L    ALT 32 10 - 44 U/L    Anion Gap 8 8 - 16 mmol/L    eGFR if African American >60.0 >60 mL/min/1.73 m^2    eGFR if non African American >60.0 >60 mL/min/1.73 m^2   Magnesium    Collection Time: 04/21/19  4:19 AM   Result Value Ref Range    Magnesium 1.9 1.6 - 2.6 mg/dL   Phosphorus    Collection Time: 04/21/19  4:19 AM   Result Value Ref Range    Phosphorus 4.4 2.7 - 4.5 mg/dL         Significant Imaging:     I have reviewed all pertinent imaging studies from the last 24 hours

## 2019-04-21 NOTE — ASSESSMENT & PLAN NOTE
Sinus tachycardia has resolved, patient is in NSR (90's)  Sinus tachycardia on EKG nonspecific ST changes in inferior leads  No prior cardiac hx but has many risk factors  2D echo with EF 60%, no wall motion abnormalities  Asymptomatic, serial troponins negative  Evaluate and treat underlying causes of sinus tachycardia  Continue Metoprolol   Recommend ASA and Statin when ok with primary due to multiple risk factors

## 2019-04-22 LAB
ALBUMIN SERPL BCP-MCNC: 2.7 G/DL (ref 3.5–5.2)
ALP SERPL-CCNC: 118 U/L (ref 55–135)
ALT SERPL W/O P-5'-P-CCNC: 33 U/L (ref 10–44)
ANION GAP SERPL CALC-SCNC: 8 MMOL/L (ref 8–16)
AST SERPL-CCNC: 62 U/L (ref 10–40)
BASOPHILS # BLD AUTO: 0.08 K/UL (ref 0–0.2)
BASOPHILS NFR BLD: 0.8 % (ref 0–1.9)
BILIRUB SERPL-MCNC: 0.2 MG/DL (ref 0.1–1)
BUN SERPL-MCNC: 14 MG/DL (ref 6–20)
CALCIUM SERPL-MCNC: 9.3 MG/DL (ref 8.7–10.5)
CHLORIDE SERPL-SCNC: 98 MMOL/L (ref 95–110)
CO2 SERPL-SCNC: 31 MMOL/L (ref 23–29)
CREAT SERPL-MCNC: 0.8 MG/DL (ref 0.5–1.4)
DIFFERENTIAL METHOD: ABNORMAL
EOSINOPHIL # BLD AUTO: 0.5 K/UL (ref 0–0.5)
EOSINOPHIL NFR BLD: 5 % (ref 0–8)
ERYTHROCYTE [DISTWIDTH] IN BLOOD BY AUTOMATED COUNT: 14.8 % (ref 11.5–14.5)
EST. GFR  (AFRICAN AMERICAN): >60 ML/MIN/1.73 M^2
EST. GFR  (NON AFRICAN AMERICAN): >60 ML/MIN/1.73 M^2
GLUCOSE SERPL-MCNC: 108 MG/DL (ref 70–110)
HCT VFR BLD AUTO: 27.2 % (ref 40–54)
HGB BLD-MCNC: 9 G/DL (ref 14–18)
HIV 1+2 AB+HIV1 P24 AG SERPL QL IA: NEGATIVE
IMM GRANULOCYTES # BLD AUTO: 0.08 K/UL (ref 0–0.04)
IMM GRANULOCYTES NFR BLD AUTO: 0.8 % (ref 0–0.5)
LYMPHOCYTES # BLD AUTO: 2.2 K/UL (ref 1–4.8)
LYMPHOCYTES NFR BLD: 22.2 % (ref 18–48)
MAGNESIUM SERPL-MCNC: 1.7 MG/DL (ref 1.6–2.6)
MCH RBC QN AUTO: 26.5 PG (ref 27–31)
MCHC RBC AUTO-ENTMCNC: 33.1 G/DL (ref 32–36)
MCV RBC AUTO: 80 FL (ref 82–98)
MONOCYTES # BLD AUTO: 0.7 K/UL (ref 0.3–1)
MONOCYTES NFR BLD: 7.3 % (ref 4–15)
NEUTROPHILS # BLD AUTO: 6.2 K/UL (ref 1.8–7.7)
NEUTROPHILS NFR BLD: 63.9 % (ref 38–73)
NRBC BLD-RTO: 0 /100 WBC
PHOSPHATE SERPL-MCNC: 4.3 MG/DL (ref 2.7–4.5)
PLATELET # BLD AUTO: 663 K/UL (ref 150–350)
PMV BLD AUTO: 9.4 FL (ref 9.2–12.9)
POTASSIUM SERPL-SCNC: 4.2 MMOL/L (ref 3.5–5.1)
PROT SERPL-MCNC: 7 G/DL (ref 6–8.4)
RBC # BLD AUTO: 3.39 M/UL (ref 4.6–6.2)
RPR SER QL: NORMAL
SODIUM SERPL-SCNC: 137 MMOL/L (ref 136–145)
WBC # BLD AUTO: 9.74 K/UL (ref 3.9–12.7)

## 2019-04-22 PROCEDURE — 11000001 HC ACUTE MED/SURG PRIVATE ROOM

## 2019-04-22 PROCEDURE — 83735 ASSAY OF MAGNESIUM: CPT

## 2019-04-22 PROCEDURE — 63600175 PHARM REV CODE 636 W HCPCS: Performed by: SURGERY

## 2019-04-22 PROCEDURE — 85025 COMPLETE CBC W/AUTO DIFF WBC: CPT

## 2019-04-22 PROCEDURE — 25000003 PHARM REV CODE 250: Performed by: SURGERY

## 2019-04-22 PROCEDURE — 97112 NEUROMUSCULAR REEDUCATION: CPT

## 2019-04-22 PROCEDURE — 25000003 PHARM REV CODE 250: Performed by: STUDENT IN AN ORGANIZED HEALTH CARE EDUCATION/TRAINING PROGRAM

## 2019-04-22 PROCEDURE — 80053 COMPREHEN METABOLIC PANEL: CPT

## 2019-04-22 PROCEDURE — 97530 THERAPEUTIC ACTIVITIES: CPT

## 2019-04-22 PROCEDURE — 84100 ASSAY OF PHOSPHORUS: CPT

## 2019-04-22 PROCEDURE — 36415 COLL VENOUS BLD VENIPUNCTURE: CPT

## 2019-04-22 RX ORDER — CIPROFLOXACIN 500 MG/1
500 TABLET ORAL EVERY 12 HOURS
Status: DISCONTINUED | OUTPATIENT
Start: 2019-04-22 | End: 2019-04-25 | Stop reason: HOSPADM

## 2019-04-22 RX ADMIN — POLYETHYLENE GLYCOL 3350 17 G: 17 POWDER, FOR SOLUTION ORAL at 09:04

## 2019-04-22 RX ADMIN — SENNOSIDES AND DOCUSATE SODIUM 1 TABLET: 8.6; 5 TABLET ORAL at 09:04

## 2019-04-22 RX ADMIN — OXYCODONE HYDROCHLORIDE 10 MG: 10 TABLET ORAL at 05:04

## 2019-04-22 RX ADMIN — PREGABALIN 225 MG: 150 CAPSULE ORAL at 08:04

## 2019-04-22 RX ADMIN — ZOLPIDEM TARTRATE 5 MG: 5 TABLET ORAL at 12:04

## 2019-04-22 RX ADMIN — METOPROLOL SUCCINATE 50 MG: 50 TABLET, EXTENDED RELEASE ORAL at 09:04

## 2019-04-22 RX ADMIN — OXYCODONE HYDROCHLORIDE 10 MG: 10 TABLET ORAL at 10:04

## 2019-04-22 RX ADMIN — CIPROFLOXACIN HYDROCHLORIDE 500 MG: 500 TABLET, FILM COATED ORAL at 05:04

## 2019-04-22 RX ADMIN — ZOLPIDEM TARTRATE 5 MG: 5 TABLET ORAL at 11:04

## 2019-04-22 RX ADMIN — OXYCODONE HYDROCHLORIDE 10 MG: 10 TABLET ORAL at 09:04

## 2019-04-22 RX ADMIN — OXYCODONE HYDROCHLORIDE 10 MG: 10 TABLET ORAL at 02:04

## 2019-04-22 RX ADMIN — CIPROFLOXACIN 400 MG: 2 INJECTION, SOLUTION INTRAVENOUS at 05:04

## 2019-04-22 NOTE — PLAN OF CARE
04/22/19 1019   Post-Acute Status   Post-Acute Authorization Placement   Post-Acute Placement Status Referrals Sent       Recs are now for rehab. Went talk with patient and family and they chose [facility].  Referral sent through [facility].  SW in contact with CM and Medical staff. Will continue to follow and offer support as needed.     Bogdan Kelley, OWEN  [facility]   Ext. [phone]

## 2019-04-22 NOTE — PLAN OF CARE
Problem: Adult Inpatient Plan of Care  Goal: Plan of Care Review      Recommendations    Recommendation/Intervention:     1. Continue Regular diet + Boost Plus.    Pt with good appetite and po intake at this time.   2. RD following.     Goals: meet >85% EEN/EPN  Nutrition Goal Status: new

## 2019-04-22 NOTE — PROGRESS NOTES
"[facility]  Adult Nutrition  Progress Note    SUMMARY       Recommendations    Recommendation/Intervention:     1. Continue Regular diet + Boost Plus.    Pt with good appetite and po intake at this time.   2. RD following.     Goals: meet >85% EEN/EPN  Nutrition Goal Status: new  Communication of RD Recs: (POC)    Reason for Assessment    Reason For Assessment: length of stay  Diagnosis: other (see comments)( Critical lower limb ischemia)  Relevant Medical History: HTN, arthritis, cerebral palsy, PVD   General Information Comments: S/p emergent L AKA 3/20. Now AKA washout/debridement with wound vac placement and R AKA 4/11. Pt reports good appetite and eats % of all meals. Drinks 3 Boost Plus/day. Denies N/V/D/C. States that appetite was decent PTA with no wt loss.  lbs. NFPE not warranted. RD does not feel that pt meets malnutrition criteria at this time.  Nutrition Discharge Planning: adequate po intake    Nutrition Risk Screen    Nutrition Risk Screen: no indicators present    Nutrition/Diet History    Spiritual, Cultural Beliefs, Taoism Practices, Values that Affect Care: no  Factors Affecting Nutritional Intake: None identified at this time    Anthropometrics    Temp: 96.4 °F (35.8 °C)  Height Method: Stated  Height: 5' 8" (172.7 cm)  Height (inches): 68 in  Weight Method: Bed Scale  Weight: 67.1 kg (148 lb)  Weight (lb): 148 lb  Ideal Body Weight (IBW), Male: 154 lb  % Ideal Body Weight, Male (lb): 96.1 lb  BMI (Calculated): 22.6  BMI Grade: (P) 18.5-24.9 - normal  Amputation %: (P) 16, 16  Total Amputation %: (P) 32  Amputation Ideal Body Weight (IBW), Male (lb): (P) 122 lb  Amputee BMI (kg/m2): (P) 33.18 kg/m2     Lab/Procedures/Meds    Pertinent Labs Reviewed: reviewed  Pertinent Labs Comments: AST 62  Pertinent Medications Reviewed: reviewed  Pertinent Medications Comments: metoprolol, docusate    Estimated/Assessed Needs    Weight Used For Calorie Calculations: 67.1 kg " (147 lb 14.9 oz)  Energy Calorie Requirements (kcal): 1825 kcal/day  Energy Need Method: Sullivan-St Jeor(x 1.25)  Protein Requirements: 87 gm/day(1.3 gm/kg)  Weight Used For Protein Calculations: 67.1 kg (147 lb 14.9 oz)  Fluid Requirements (mL): 1 mL/kcal or per MD     RDA Method (mL): 1825     Nutrition Prescription Ordered    Current Diet Order: Regular  Oral Nutrition Supplement: Boost Plus    Evaluation of Received Nutrient/Fluid Intake    I/O: -5.7L since admit  Comments: LBM 4/20  Tolerance: tolerating  % Intake of Estimated Energy Needs: 75 - 100 %  % Meal Intake: 75 - 100 %    Nutrition Risk    Level of Risk/Frequency of Follow-up: low(f/u 1 x wk)     Assessment and Plan     Nutrition Problem  Increased Nutrient Needs: Protein    Related to (etiology):   Wound healing demands    Signs and Symptoms (as evidenced by):   S/p L and R AKA with wound vac    Interventions:  Collaboration and Referral of Nutrition Care    Nutrition Diagnosis Status:   New     Monitor and Evaluation    Food and Nutrient Intake: energy intake, food and beverage intake  Food and Nutrient Adminstration: diet order  Physical Activity and Function: nutrition-related ADLs and IADLs  Anthropometric Measurements: weight, weight change, body mass index  Biochemical Data, Medical Tests and Procedures: electrolyte and renal panel, gastrointestinal profile, glucose/endocrine profile, inflammatory profile, lipid profile  Nutrition-Focused Physical Findings: overall appearance     Nutrition Follow-Up    RD Follow-up?: Yes

## 2019-04-22 NOTE — CARE UPDATE
Rapid Response Nurse Chart Check     Chart check completed, abnormal VS noted, bedside RNErin contacted, reports patient was OOB with PT when HR elevated, no concerns verbalized at this time, instructed to call [phone] for further concerns or assistance.

## 2019-04-22 NOTE — PT/OT/SLP PROGRESS
Occupational Therapy   Treatment    Name: Pranay Colindres  MRN: [id number]  Admitting Diagnosis:  Critical lower limb ischemia  6 Days Post-Op    Recommendations:     Discharge Recommendations: rehabilitation facility  Discharge Equipment Recommendations:  commode  Barriers to discharge:  None    Assessment:     Pranay Colindres is a 59 y.o. male with a medical diagnosis of Critical lower limb ischemia.  He presents with the following performance deficits affecting function:  weakness, impaired endurance, gait instability, impaired functional mobilty, impaired self care skills, impaired balance, decreased lower extremity function, pain, impaired skin.     Pt tolerated session well. Pt presents w/ improved bed mobility requiring CGA for balance and extended time as pt required multiple breaks 2/2 muscle fatigue and pain. Pt also required extra time during functional mobility to problem solve transfer technique. Pt required Mod A to scoot pivot into w/c in prep for functional activities. He participated in dynamic sitting balance exercise while up in w/c to address core strength. Pt continues to benefit from skilled OT to address the above listed impairments.     Rehab Prognosis:  Good; patient would benefit from acute skilled OT services to address these deficits and reach maximum level of function.       Plan:     Patient to be seen 4 x/week to address the above listed problems via self-care/home management, therapeutic activities, therapeutic exercises  · Plan of Care Expires: 05/17/19  · Plan of Care Reviewed with: patient    Subjective     Pain/Comfort:  · Pain Rating 1: 7/10  · Location - Side 1: Bilateral  · Location - Orientation 1: generalized  · Location 1: (residual limbs)  · Pain Addressed 1: Reposition, Cessation of Activity, Pre-medicate for activity  · Pain Rating Post-Intervention 1: (did not rate)    Objective:     Communicated with: RN prior to session.  Patient found up in chair with wound vac,  telemetry upon OT entry to room.    General Precautions: Standard, fall   Orthopedic Precautions:N/A   Braces: N/A     Occupational Performance:     Bed Mobility:    · Patient completed Scooting/Bridging with minimum assistance  · Patient completed Supine to Sit with contact guard assistance     Functional Mobility/Transfers:  · Patient completed Bed <> Chair Transfer using Scoot Pivot technique with moderate assistance with w/c    Activities of Daily Living:  · Not performed this session      AMPA 6 Click ADL: 17    Treatment & Education:  - OT POC  - Importance of OOB activity to maximize recovery   - verbal cues for hand placement to facilitate bed mobility   - extra time allowed during bed mobility and transfer  - assist w/ problem solving   - participated in dynamic sitting exercises to work on core strength and overall coordination    Patient left up in chair with all lines intact, call button in reach and RN notifiedEducation:      GOALS:   Multidisciplinary Problems     Occupational Therapy Goals        Problem: Occupational Therapy Goal    Goal Priority Disciplines Outcome Interventions   Occupational Therapy Goal     OT, PT/OT Ongoing (interventions implemented as appropriate)    Description:  Re-Eval performed on 4/18/2019  Goals to be met by: 4/25/2019     Patient will increase functional independence with ADLs by performing:    UE Dressing with Contact Guard Assistance.  LE Dressing with Moderate Assistance.  Grooming while sitting up in w/c with Modified [place].  Toileting from bedside commode with Minimal Assistance for hygiene and clothing management.   Supine to sit with Contact Guard Assistance. -- Met (4/22)  Toilet transfer to bedside commode via scooting technique with Moderate Assistance.                        Time Tracking:     OT Date of Treatment: 04/22/19  OT Start Time: 1036  OT Stop Time: 1105  OT Total Time (min): 29 min    Billable Minutes:Neuromuscular Re-education  29    Mandy Sanders, OT  4/22/2019

## 2019-04-22 NOTE — ASSESSMENT & PLAN NOTE
59 y.o. male with h/o HTN, arthritis, and cerebral palsy, tobacco abuse, PVD with previous aorto-bifem and L fem/pop. S/p emergent L AKA 3/20/19. Presents with fat necrosis L AKA stump and ischemic R foot rest pain. 6 Days Post-Op from L AKA washout/debridement with wound vac placement and Right AKA on 4/11/19. Left AKA with tracking muscle necrosis and purulence.    - Regular diet  - IV Cipro while in house, will send out on PO cipro to complete total 14 days of ABx  - PRN pain control   - Will change wound vac tubing today  - HIV & RPR pending    Dispo: Medically stable for discharge today.  Placement pending.

## 2019-04-22 NOTE — PLAN OF CARE
Problem: Physical Therapy Goal  Goal: Physical Therapy Goal  Goals to be met by: 19     Patient will increase functional independence with mobility by performin. Supine to sit with Contact Guard Assistance - met  2. Sit to supine with Contact Guard Assistance - Not met  3. Bed to chair transfer with Minimal assistance via slideboard into wheelchair - Not met  4. Wheelchair propulsion x 500 feet with [place] using bilateral upper extremities - Not met  5. Sitting at edge of bed x 10 minutes with Contact Guard Assistance without (B) UE usage - Not met  6. Lower extremity exercise program x 10 reps per handout, with supervision - Not met      Outcome: Ongoing (interventions implemented as appropriate)  Pt tolerated session well today with no complications. Pt took inc time to reach EOB sitting 2/2 weakness and pain limiting mobility. Pt required use of (B) UE to prevent LOB at EOB but showed improved stability with more time spent at EOB. Pt unable to perform t/f to w/c or t/f back to bed without PT (A). Pt will cont to benefit from therapy services and is appropriate for IP Rehab.

## 2019-04-22 NOTE — PLAN OF CARE
Problem: Adult Inpatient Plan of Care  Goal: Plan of Care Review  Outcome: Ongoing (interventions implemented as appropriate)  Pt AAOx4 and VSS. Pt is progressing with plan of care. Free of skin breakdown as the pt positioned/repositioned well independently. Clean, dry, and intact dressing noted on bilateral residual limb. Wound vac in place and functioning.  Incentive spirometer at bedside and pt instructed on its use. Pain controlled well with PRN meds.  Frequent rounds made to assess pain and safety and no complaints at this time noted. Side rails up x 2. Bed locked. Call light within reach. No falls noted. Will continue to monitor.

## 2019-04-22 NOTE — PROGRESS NOTES
"[employer] had new home wound vac placed. It worked for a night and then started beeping with error message, "system clogged."     But the system was not clogged. We exchanged all the tubing and cannisters, and it was still driving [employer] and I crazy with its incessant beeping.    I tried to get a new home pump, but these kinds of pumps are not available at the hospital after hours.    So, I ordered a hospital wound vac and placed it on the existing dressing. It is done such that you can pull up on the corner which my fingers are holding in the picture below and remove the new benny pad without disrupting the original benny pad. You will just need to place some new tape over the benny pad hole to patch it.         JAMES Rosenbaum MD   General Surgery, PGY-1  Pager: [phone]    "

## 2019-04-22 NOTE — PROGRESS NOTES
Pharmacist Intervention IV to PO Note    Pranay Colindres is a 59 y.o. male being treated with IV medication ciprofloxacin     Patient Data:    Vital Signs (Most Recent):  Temp: 96.9 °F (36.1 °C) (04/22/19 0724)  Pulse: 78 (04/22/19 0736)  Resp: 18 (04/22/19 0724)  BP: 118/83 (04/22/19 0724)  SpO2: 98 % (04/22/19 0724) Vital Signs (72h Range):  Temp:  [96.2 °F (35.7 °C)-98.7 °F (37.1 °C)]   Pulse:  []   Resp:  [16-18]   BP: (111-160)/(73-93)   SpO2:  [96 %-100 %]      CBC:  Recent Labs   Lab 04/20/19  0511 04/21/19  0419 04/22/19  0530   WBC 10.13 11.22 9.74   RBC 3.59* 3.50* 3.39*   HGB 9.6* 9.3* 9.0*   HCT 28.4* 27.3* 27.2*   * 640* 663*   MCV 79* 78* 80*   MCH 26.7* 26.6* 26.5*   MCHC 33.8 34.1 33.1     CMP:     Recent Labs   Lab 04/20/19  0511 04/21/19  0419 04/22/19  0530    135* 108   CALCIUM 9.6 9.6 9.3   ALBUMIN 2.7* 2.6* 2.7*   PROT 7.4 7.0 7.0   * 137 137   K 4.4 4.3 4.2   CO2 30* 29 31*   CL 95 100 98   BUN 14 12 14   CREATININE 0.7 0.7 0.8   ALKPHOS 104 113 118   ALT 25 32 33   AST 72* 78* 62*   BILITOT 0.2 0.2 0.2       Dietary Orders:  Diet Orders            Diet Adult Regular (IDDSI Level 7): Regular starting at 04/16 1250    Dietary nutrition supplements [facility]; Boost Plus - Any flavor starting at 04/15 1715            Based on the following criteria, this patient qualifies for intravenous to oral conversion:  [x] The patient?s gastrointestinal tract is functioning (tolerating medications via oral or enteral route for 24 hours and tolerating food or enteral feeds for 24 hours).  [x] The patient is hemodynamically stable for 24 hours (heart rate <100 beats per minute, systolic blood pressure >99 mm Hg, and respiratory rate <20 breaths per minute).  [x] The patient shows clinical improvement (afebrile for at least 24 hours and white blood cell count downtrending or normalized). Additionally, the patient must be non-neutropenic (absolute neutrophil count >500  cells/mm3).  [x] For antimicrobials, the patient has received IV therapy for at least 24 hours.    IV medication 400mg iv q12 hr will be changed to oral medication 500 mg q12h     Pharmacist's Name: Erin Rios  Pharmacist's Extension: [phone]

## 2019-04-22 NOTE — PT/OT/SLP PROGRESS
Physical Therapy Treatment    Patient Name:  Pranay Colindres   MRN:  [id number]    Recommendations:     Discharge Recommendations:  rehabilitation facility   Discharge Equipment Recommendations: commode   Barriers to discharge: Inaccessible home and Decreased caregiver support    Assessment:     Pranay Colindres is a 59 y.o. male admitted with a medical diagnosis of Critical lower limb ischemia.  He presents with the following impairments/functional limitations:  weakness, impaired endurance, impaired self care skills, impaired balance, gait instability, impaired functional mobilty, pain, impaired joint extensibility, decreased ROM, decreased lower extremity function, orthopedic precautions.    -Pt tolerated session well today with no complications. Pt took inc time to reach EOB sitting 2/2 weakness and pain limiting mobility. Pt required use of (B) UE to prevent LOB at EOB but showed improved stability with more time spent at EOB. Pt unable to perform t/f to w/c or t/f back to bed without PT (A). Pt will cont to benefit from therapy services and is appropriate for IP Rehab.    Rehab Prognosis: Good; patient would benefit from acute skilled PT services to address these deficits and reach maximum level of function.    Recent Surgery: Procedure(s) (LRB):  AMPUTATION, ABOVE KNEE left hip disarticulation (Left)  REPLACEMENT, WOUND VAC 6 Days Post-Op    Plan:     During this hospitalization, patient to be seen 4 x/week to address the identified rehab impairments via therapeutic activities, therapeutic exercises, neuromuscular re-education and progress toward the following goals:    · Plan of Care Expires:  05/18/19    Subjective     Chief Complaint: impaired mobility  Patient/Family Comments/goals: return home to [facility]  Pain/Comfort:  · Pain Rating 1: 7/10  · Location - Side 1: Bilateral  · Location - Orientation 1: generalized  · Location 1: (residual limbs)  · Pain Addressed 1: Reposition, Cessation of Activity,  Pre-medicate for activity      Objective:     Communicated with nsg prior to session.  Patient found supine with wound vac, telemetry upon PT entry to room.     General Precautions: Standard, fall   Orthopedic Precautions:N/A   Braces: N/A     Functional Mobility:  · Bed Mobility:     · Scooting: minimum assistance  · Supine to Sit: contact guard assistance  · Transfers:     · Bed to Chair: moderate assistance with  no AD  using  Scoot Pivot, max (A) scoot pivot back to bed      AM-PAC 6 CLICK MOBILITY  Turning over in bed (including adjusting bedclothes, sheets and blankets)?: 2  Sitting down on and standing up from a chair with arms (e.g., wheelchair, bedside commode, etc.): 1  Moving from lying on back to sitting on the side of the bed?: 3  Moving to and from a bed to a chair (including a wheelchair)?: 2  Need to walk in hospital room?: 1  Climbing 3-5 steps with a railing?: 1  Basic Mobility Total Score: 10       Therapeutic Activities and Exercises:   -Pt educated on:  A. PT POC and role of PT  B. Importance of OOB activity to improve functional outcomes   C. DME mgmt and t/f sequencing  D. Performing HEP to reduce the risk of developing blood clots  E. D/c planning      Patient left up in chair with all lines intact, call button in reach and nsg notified..    GOALS:   Multidisciplinary Problems     Physical Therapy Goals        Problem: Physical Therapy Goal    Goal Priority Disciplines Outcome Goal Variances Interventions   Physical Therapy Goal     PT, PT/OT Ongoing (interventions implemented as appropriate)     Description:  Goals to be met by: 19     Patient will increase functional independence with mobility by performin. Supine to sit with Contact Guard Assistance - met  2. Sit to supine with Contact Guard Assistance - Not met  3. Bed to chair transfer with Minimal assistance via slideboard into wheelchair - Not met  4. Wheelchair propulsion x 500 feet with [place] using bilateral upper  extremities - Not met  5. Sitting at edge of bed x 10 minutes with Contact Guard Assistance without (B) UE usage - Not met  6. Lower extremity exercise program x 10 reps per handout, with supervision - Not met                        Time Tracking:     PT Received On: 04/22/19  PT Start Time: 1036     PT Stop Time: 1105  PT Total Time (min): 29 min     Billable Minutes: Therapeutic Activity 29    Treatment Type: Treatment  PT/PTA: PT           Pietro Dasilva, PT  04/22/2019

## 2019-04-22 NOTE — SUBJECTIVE & OBJECTIVE
Interval History:  HR still intermittently elevated, still asymptomatic and reports feeling well  ID seen yesterday - rec HIV and RPR. Pending  Persistent beeping from wound vac overnight, changed to hospital vac.       Medications:  Continuous Infusions:  Scheduled Meds:   ciprofloxacin  400 mg Intravenous Q12H    metoprolol succinate  50 mg Oral Daily    polyethylene glycol  17 g Oral Daily    pregabalin  225 mg Oral QHS    senna-docusate 8.6-50 mg  1 tablet Oral Daily     PRN Meds:bisacodyl, ondansetron, oxyCODONE, oxyCODONE, promethazine (PHENERGAN) IVPB, sodium chloride 0.9%, sodium chloride 0.9%, zolpidem     Objective:     Vital Signs (Most Recent):  Temp: 96.9 °F (36.1 °C) (04/22/19 0724)  Pulse: 78 (04/22/19 0736)  Resp: 18 (04/22/19 0724)  BP: 118/83 (04/22/19 0724)  SpO2: 98 % (04/22/19 0724) Vital Signs (24h Range):  Temp:  [96.9 °F (36.1 °C)-98.7 °F (37.1 °C)] 96.9 °F (36.1 °C)  Pulse:  [] 78  Resp:  [16-18] 18  SpO2:  [98 %-100 %] 98 %  BP: (111-123)/(73-83) 118/83         Physical Exam   Constitutional: He is oriented to person, place, and time. He appears well-developed and well-nourished.   Neck: No JVD present. No tracheal deviation present.   Cardiovascular: Normal rate and regular rhythm.   Pulmonary/Chest: Effort normal. No respiratory distress.   Abdominal: Soft. He exhibits no distension. There is no tenderness. There is no guarding.   Musculoskeletal: He exhibits no edema.   L AKA with wound vac, minimal edema, pink and healthy appearing tissue   R AKA incision c/d/i    Neurological: He is alert and oriented to person, place, and time.   Skin: Skin is warm and dry.   Nursing note and vitals reviewed.      Significant Labs:  CBC:   Recent Labs   Lab 04/22/19  0530   WBC 9.74   RBC 3.39*   HGB 9.0*   HCT 27.2*   *   MCV 80*   MCH 26.5*   MCHC 33.1     CMP:   Recent Labs   Lab 04/22/19  0530      CALCIUM 9.3   ALBUMIN 2.7*   PROT 7.0      K 4.2   CO2 31*   CL 98    BUN 14   CREATININE 0.8   ALKPHOS 118   ALT 33   AST 62*   BILITOT 0.2       Significant Diagnostics:  I have reviewed all pertinent imaging results/findings within the past 24 hours.

## 2019-04-22 NOTE — PROGRESS NOTES
[facility]  Vascular Surgery  Progress Note    Patient Name: Pranay Colindres  MRN: [id number]  Admission Date: 4/10/2019  Primary Care Provider: Jenaro Torres MD    Subjective:     Interval History: HR still intermittently elevated, still asymptomatic and reports feeling well  ID seen yesterday - rec HIV and RPR. Pending  Persistent beeping from wound vac overnight, changed to hospital vac.    Post-Op Info:  Procedure(s) (LRB):  AMPUTATION, ABOVE KNEE left hip disarticulation (Left)  REPLACEMENT, WOUND VAC   6 Days Post-Op       Medications:  Continuous Infusions:  Scheduled Meds:   ciprofloxacin  400 mg Intravenous Q12H    metoprolol succinate  50 mg Oral Daily    polyethylene glycol  17 g Oral Daily    pregabalin  225 mg Oral QHS    senna-docusate 8.6-50 mg  1 tablet Oral Daily     PRN Meds:bisacodyl, ondansetron, oxyCODONE, oxyCODONE, promethazine (PHENERGAN) IVPB, sodium chloride 0.9%, sodium chloride 0.9%, zolpidem     Objective:     Vital Signs (Most Recent):  Temp: 96.9 °F (36.1 °C) (04/22/19 0724)  Pulse: 78 (04/22/19 0736)  Resp: 18 (04/22/19 0724)  BP: 118/83 (04/22/19 0724)  SpO2: 98 % (04/22/19 0724) Vital Signs (24h Range):  Temp:  [96.9 °F (36.1 °C)-98.7 °F (37.1 °C)] 96.9 °F (36.1 °C)  Pulse:  [] 78  Resp:  [16-18] 18  SpO2:  [98 %-100 %] 98 %  BP: (111-123)/(73-83) 118/83         Physical Exam   Constitutional: He is oriented to person, place, and time. He appears well-developed and well-nourished.   Neck: No JVD present. No tracheal deviation present.   Cardiovascular: Normal rate and regular rhythm.   Pulmonary/Chest: Effort normal. No respiratory distress.   Abdominal: Soft. He exhibits no distension. There is no tenderness. There is no guarding.   Musculoskeletal: He exhibits no edema.   L AKA with wound vac, minimal edema, pink and healthy appearing tissue   R AKA incision c/d/i    Neurological: He is alert and oriented to person, place, and time.   Skin: Skin is  warm and dry.   Nursing note and vitals reviewed.      Significant Labs:  CBC:   Recent Labs   Lab 04/22/19  0530   WBC 9.74   RBC 3.39*   HGB 9.0*   HCT 27.2*   *   MCV 80*   MCH 26.5*   MCHC 33.1     CMP:   Recent Labs   Lab 04/22/19  0530      CALCIUM 9.3   ALBUMIN 2.7*   PROT 7.0      K 4.2   CO2 31*   CL 98   BUN 14   CREATININE 0.8   ALKPHOS 118   ALT 33   AST 62*   BILITOT 0.2       Significant Diagnostics:  I have reviewed all pertinent imaging results/findings within the past 24 hours.    Assessment/Plan:     * Critical lower limb ischemia  59 y.o. male with h/o HTN, arthritis, and cerebral palsy, tobacco abuse, PVD with previous aorto-bifem and L fem/pop. S/p emergent L AKA 3/20/19. Presents with fat necrosis L AKA stump and ischemic R foot rest pain. 6 Days Post-Op from L AKA washout/debridement with wound vac placement and Right AKA on 4/11/19. Left AKA with tracking muscle necrosis and purulence.    - Regular diet  - IV Cipro while in house, will send out on PO cipro to complete total 14 days of ABx  - PRN pain control   - Will change wound vac tubing today  - HIV & RPR pending    Dispo: Medically stable for discharge today.  Placement pending.         Zakia Lovett MD  Vascular Surgery  [facility]

## 2019-04-22 NOTE — PLAN OF CARE
Problem: Occupational Therapy Goal  Goal: Occupational Therapy Goal  Re-Eval performed on 4/18/2019  Goals to be met by: 4/25/2019     Patient will increase functional independence with ADLs by performing:    UE Dressing with Contact Guard Assistance.  LE Dressing with Moderate Assistance.  Grooming while sitting up in w/c with Modified [place].  Toileting from bedside commode with Minimal Assistance for hygiene and clothing management.   Supine to sit with Contact Guard Assistance. -- Met (4/22)  Toilet transfer to bedside commode via scooting technique with Moderate Assistance.      Outcome: Ongoing (interventions implemented as appropriate)  Pt continues to progress well with therapy.     Comments: Cont OT POC

## 2019-04-23 LAB
ALBUMIN SERPL BCP-MCNC: 2.7 G/DL (ref 3.5–5.2)
ALP SERPL-CCNC: 129 U/L (ref 55–135)
ALT SERPL W/O P-5'-P-CCNC: 34 U/L (ref 10–44)
ANION GAP SERPL CALC-SCNC: 9 MMOL/L (ref 8–16)
AST SERPL-CCNC: 61 U/L (ref 10–40)
BASOPHILS # BLD AUTO: 0.08 K/UL (ref 0–0.2)
BASOPHILS NFR BLD: 0.8 % (ref 0–1.9)
BILIRUB SERPL-MCNC: 0.1 MG/DL (ref 0.1–1)
BUN SERPL-MCNC: 13 MG/DL (ref 6–20)
CALCIUM SERPL-MCNC: 9.3 MG/DL (ref 8.7–10.5)
CHLORIDE SERPL-SCNC: 100 MMOL/L (ref 95–110)
CO2 SERPL-SCNC: 29 MMOL/L (ref 23–29)
CREAT SERPL-MCNC: 0.6 MG/DL (ref 0.5–1.4)
DIFFERENTIAL METHOD: ABNORMAL
EOSINOPHIL # BLD AUTO: 0.5 K/UL (ref 0–0.5)
EOSINOPHIL NFR BLD: 5 % (ref 0–8)
ERYTHROCYTE [DISTWIDTH] IN BLOOD BY AUTOMATED COUNT: 14.8 % (ref 11.5–14.5)
EST. GFR  (AFRICAN AMERICAN): >60 ML/MIN/1.73 M^2
EST. GFR  (NON AFRICAN AMERICAN): >60 ML/MIN/1.73 M^2
GLUCOSE SERPL-MCNC: 111 MG/DL (ref 70–110)
HCT VFR BLD AUTO: 28.1 % (ref 40–54)
HGB BLD-MCNC: 9.7 G/DL (ref 14–18)
IMM GRANULOCYTES # BLD AUTO: 0.11 K/UL (ref 0–0.04)
IMM GRANULOCYTES NFR BLD AUTO: 1.1 % (ref 0–0.5)
LYMPHOCYTES # BLD AUTO: 2.6 K/UL (ref 1–4.8)
LYMPHOCYTES NFR BLD: 26.2 % (ref 18–48)
MAGNESIUM SERPL-MCNC: 1.9 MG/DL (ref 1.6–2.6)
MCH RBC QN AUTO: 26.9 PG (ref 27–31)
MCHC RBC AUTO-ENTMCNC: 34.5 G/DL (ref 32–36)
MCV RBC AUTO: 78 FL (ref 82–98)
MONOCYTES # BLD AUTO: 0.8 K/UL (ref 0.3–1)
MONOCYTES NFR BLD: 8.3 % (ref 4–15)
NEUTROPHILS # BLD AUTO: 5.7 K/UL (ref 1.8–7.7)
NEUTROPHILS NFR BLD: 58.6 % (ref 38–73)
NRBC BLD-RTO: 0 /100 WBC
PHOSPHATE SERPL-MCNC: 4.2 MG/DL (ref 2.7–4.5)
PLATELET # BLD AUTO: 651 K/UL (ref 150–350)
PMV BLD AUTO: 9.3 FL (ref 9.2–12.9)
POTASSIUM SERPL-SCNC: 4 MMOL/L (ref 3.5–5.1)
PROT SERPL-MCNC: 6.9 G/DL (ref 6–8.4)
RBC # BLD AUTO: 3.6 M/UL (ref 4.6–6.2)
SODIUM SERPL-SCNC: 138 MMOL/L (ref 136–145)
WBC # BLD AUTO: 9.77 K/UL (ref 3.9–12.7)

## 2019-04-23 PROCEDURE — 84100 ASSAY OF PHOSPHORUS: CPT

## 2019-04-23 PROCEDURE — 80053 COMPREHEN METABOLIC PANEL: CPT

## 2019-04-23 PROCEDURE — 25000003 PHARM REV CODE 250: Performed by: SURGERY

## 2019-04-23 PROCEDURE — 97112 NEUROMUSCULAR REEDUCATION: CPT

## 2019-04-23 PROCEDURE — 25000003 PHARM REV CODE 250: Performed by: STUDENT IN AN ORGANIZED HEALTH CARE EDUCATION/TRAINING PROGRAM

## 2019-04-23 PROCEDURE — 36415 COLL VENOUS BLD VENIPUNCTURE: CPT

## 2019-04-23 PROCEDURE — 11000001 HC ACUTE MED/SURG PRIVATE ROOM

## 2019-04-23 PROCEDURE — 85025 COMPLETE CBC W/AUTO DIFF WBC: CPT

## 2019-04-23 PROCEDURE — 83735 ASSAY OF MAGNESIUM: CPT

## 2019-04-23 PROCEDURE — 97530 THERAPEUTIC ACTIVITIES: CPT

## 2019-04-23 RX ADMIN — OXYCODONE HYDROCHLORIDE 10 MG: 10 TABLET ORAL at 12:04

## 2019-04-23 RX ADMIN — SENNOSIDES AND DOCUSATE SODIUM 1 TABLET: 8.6; 5 TABLET ORAL at 09:04

## 2019-04-23 RX ADMIN — ZOLPIDEM TARTRATE 5 MG: 5 TABLET ORAL at 10:04

## 2019-04-23 RX ADMIN — CIPROFLOXACIN HYDROCHLORIDE 500 MG: 500 TABLET, FILM COATED ORAL at 08:04

## 2019-04-23 RX ADMIN — POLYETHYLENE GLYCOL 3350 17 G: 17 POWDER, FOR SOLUTION ORAL at 09:04

## 2019-04-23 RX ADMIN — CIPROFLOXACIN HYDROCHLORIDE 500 MG: 500 TABLET, FILM COATED ORAL at 09:04

## 2019-04-23 RX ADMIN — OXYCODONE HYDROCHLORIDE 10 MG: 10 TABLET ORAL at 02:04

## 2019-04-23 RX ADMIN — OXYCODONE HYDROCHLORIDE 10 MG: 10 TABLET ORAL at 08:04

## 2019-04-23 RX ADMIN — METOPROLOL SUCCINATE 50 MG: 50 TABLET, EXTENDED RELEASE ORAL at 09:04

## 2019-04-23 RX ADMIN — OXYCODONE HYDROCHLORIDE 10 MG: 10 TABLET ORAL at 04:04

## 2019-04-23 RX ADMIN — PREGABALIN 225 MG: 150 CAPSULE ORAL at 08:04

## 2019-04-23 NOTE — PLAN OF CARE
04/23/19 1503   Post-Acute Status   Post-Acute Authorization Placement   Post-Acute Placement Status Pending Payor Review       Still awaiting auth from [facility].  SW in contact with CM and Medical staff. Will continue to follow and offer support as needed.     Bogdan Kelley LMSW  [facility]   Ext. [phone]

## 2019-04-23 NOTE — PT/OT/SLP PROGRESS
Physical Therapy Treatment    Patient Name:  Pranay Colindres   MRN:  [id number]    Recommendations:     Discharge Recommendations:  rehabilitation facility   Discharge Equipment Recommendations: commode   Barriers to discharge: Decreased caregiver support    Assessment:     Pranay Colindres is a 59 y.o. male admitted with a medical diagnosis of Critical lower limb ischemia.  He presents with the following impairments/functional limitations:  weakness, impaired endurance, impaired self care skills, impaired sensation, impaired functional mobilty, gait instability, impaired balance, decreased lower extremity function, pain . Pt divine session well w/ good participation. He is motivated to progress w/ sitting balance. Pt has good potential and is recommended to D/C to inpatient rehab once medically appropriate.    Rehab Prognosis: Good; patient would benefit from acute skilled PT services to address these deficits and reach maximum level of function.    Recent Surgery: Procedure(s) (LRB):  AMPUTATION, ABOVE KNEE left hip disarticulation (Left)  REPLACEMENT, WOUND VAC 7 Days Post-Op    Plan:     During this hospitalization, patient to be seen 4 x/week to address the identified rehab impairments via therapeutic activities, therapeutic exercises, neuromuscular re-education and progress toward the following goals:    · Plan of Care Expires:  05/18/19    Subjective     Chief Complaint: instability and phantom limb pain  Patient/Family Comments/goals: to get stronger  Pain/Comfort:  · Pain Rating 1: 8/10  · Location - Side 1: Bilateral  · Location - Orientation 1: generalized  · Location 1: (residual limbs)  · Pain Addressed 1: Pre-medicate for activity, Reposition      Objective:     Communicated with nursing prior to session.  Patient found supine with telemetry, wound vac upon PT entry to room.     General Precautions: Standard, fall   Orthopedic Precautions:N/A   Braces: N/A     Functional Mobility:  · Bed Mobility:   · Roll  L/R on bed w/ SBA, HOB elevated and w/ side rail  · Supine>sit on bed w/ CGA at trunk, HOB elevated and w/ side rail  · Scoot to EOB w/ Max/ModA, multiple scoots required, cueing for technique  · Sit>supine on bed w/ SBA  · Cueing and inc'd time required for all bed mobility tasks  · Transfers:    · Not performed  · Balance:   · Sitting EOB ~20min total including:  · Static sit w/ BUE support and SBA  · Static sit w/o BUE support 2 trials (each ~25s) w/ CGA/SBA for safety  · Dynamic sitting:  · Alternating forward hand taps 2x5 reps BUE  · Alternating WB to elbow 2x5 reps BUE  · Modified crunches x10 reps   · All w/ CGA/SBA for safety      AM-PAC 6 CLICK MOBILITY  Turning over in bed (including adjusting bedclothes, sheets and blankets)?: 3  Sitting down on and standing up from a chair with arms (e.g., wheelchair, bedside commode, etc.): 1  Moving from lying on back to sitting on the side of the bed?: 3  Moving to and from a bed to a chair (including a wheelchair)?: 2  Need to walk in hospital room?: 1  Climbing 3-5 steps with a railing?: 1  Basic Mobility Total Score: 11       Therapeutic Activities and Exercises:   Pt educated on role of IPR and expectations. Pt verb understanding.    Patient left HOB elevated with all lines intact and call button in reach..    GOALS:   Multidisciplinary Problems     Physical Therapy Goals        Problem: Physical Therapy Goal    Goal Priority Disciplines Outcome Goal Variances Interventions   Physical Therapy Goal     PT, PT/OT Ongoing (interventions implemented as appropriate)     Description:  Goals to be met by: 19     Patient will increase functional independence with mobility by performin. Supine to sit with Contact Guard Assistance - Met  2. Sit to supine with Contact Guard Assistance - Met 2019  3. Bed to chair transfer with Minimal assistance via slideboard into wheelchair - Not met  4. Wheelchair propulsion x 500 feet with [place] using bilateral  upper extremities - Not met  5. Sitting at edge of bed x 10 minutes with Contact Guard Assistance without (B) UE usage - Not met  6. Lower extremity exercise program x 10 reps per handout, with supervision - Not met                         Time Tracking:     PT Received On: 04/23/19  PT Start Time: 1011     PT Stop Time: 1038  PT Total Time (min): 27 min     Billable Minutes: Therapeutic Activity 12, Neuromuscular Re-education 15 and Total Time 27    Treatment Type: Treatment  PT/PTA: PT     PTA Visit Number: 0     Inés Hargrove, PT  04/23/2019

## 2019-04-23 NOTE — PROGRESS NOTES
[facility]  Vascular Surgery  Progress Note    Patient Name: Pranay Colindres  MRN: [id number]  Admission Date: 4/10/2019  Primary Care Provider: Jenaro Torres MD    Subjective:     Interval History:  HR still intermittently elevated, still asymptomatic and reports feeling well  Changed wound vac yesterday - seems to be holding suction   Uneventful night for patient     Post-Op Info:  Procedure(s) (LRB):  AMPUTATION, ABOVE KNEE left hip disarticulation (Left)  REPLACEMENT, WOUND VAC   7 Days Post-Op       Medications:  Continuous Infusions:  Scheduled Meds:   ciprofloxacin HCl  500 mg Oral Q12H    metoprolol succinate  50 mg Oral Daily    polyethylene glycol  17 g Oral Daily    pregabalin  225 mg Oral QHS    senna-docusate 8.6-50 mg  1 tablet Oral Daily     PRN Meds:bisacodyl, ondansetron, oxyCODONE, oxyCODONE, promethazine (PHENERGAN) IVPB, sodium chloride 0.9%, sodium chloride 0.9%, zolpidem     Objective:     Vital Signs (Most Recent):  Temp: 97.7 °F (36.5 °C) (04/23/19 0409)  Pulse: 108 (04/23/19 0708)  Resp: 17 (04/23/19 0409)  BP: 117/80 (04/23/19 0409)  SpO2: 98 % (04/23/19 0409) Vital Signs (24h Range):  Temp:  [96.4 °F (35.8 °C)-98.2 °F (36.8 °C)] 97.7 °F (36.5 °C)  Pulse:  [101-134] 108  Resp:  [17-18] 17  SpO2:  [98 %-100 %] 98 %  BP: (114-148)/(70-99) 117/80         Physical Exam   Constitutional: He is oriented to person, place, and time. He appears well-developed and well-nourished.   Neck: No JVD present. No tracheal deviation present.   Cardiovascular: Normal rate and regular rhythm.   Pulmonary/Chest: Effort normal. No respiratory distress.   Abdominal: Soft. He exhibits no distension. There is no tenderness. There is no guarding.   Musculoskeletal: He exhibits no edema.   L AKA with wound vac, minimal edema, pink and healthy appearing tissue   R AKA incision c/d/i    Neurological: He is alert and oriented to person, place, and time.   Skin: Skin is warm and dry.   Nursing  note and vitals reviewed.      Significant Labs:  CBC:   Recent Labs   Lab 04/23/19  0325   WBC 9.77   RBC 3.60*   HGB 9.7*   HCT 28.1*   *   MCV 78*   MCH 26.9*   MCHC 34.5     CMP:   Recent Labs   Lab 04/23/19  0325   *   CALCIUM 9.3   ALBUMIN 2.7*   PROT 6.9      K 4.0   CO2 29      BUN 13   CREATININE 0.6   ALKPHOS 129   ALT 34   AST 61*   BILITOT 0.1       Significant Diagnostics:  I have reviewed all pertinent imaging results/findings within the past 24 hours.    Assessment/Plan:     * Critical lower limb ischemia  59 y.o. male with h/o HTN, arthritis, and cerebral palsy, tobacco abuse, PVD with previous aorto-bifem and L fem/pop. S/p emergent L AKA 3/20/19. Presents with fat necrosis L AKA stump and ischemic R foot rest pain. 7 Days Post-Op from L AKA washout/debridement with wound vac placement and Right AKA on 4/11/19. Left AKA with tracking muscle necrosis and purulence.    - Regular diet  - IV Cipro while in house, will send out on PO cipro to complete total 14 days of ABx  - PRN pain control   - Wound vac changed yesterday - continue changes q 3 days   - HIV & RPR negative     Dispo: Medically stable for discharge today.  Placement pending insurance approval.         Zakia Lovett MD  Vascular Surgery  [facility]

## 2019-04-23 NOTE — PLAN OF CARE
Problem: Physical Therapy Goal  Goal: Physical Therapy Goal  Goals to be met by: 19     Patient will increase functional independence with mobility by performin. Supine to sit with Contact Guard Assistance - Met  2. Sit to supine with Contact Guard Assistance - Met 2019  3. Bed to chair transfer with Minimal assistance via slideboard into wheelchair - Not met  4. Wheelchair propulsion x 500 feet with [place] using bilateral upper extremities - Not met  5. Sitting at edge of bed x 10 minutes with Contact Guard Assistance without (B) UE usage - Not met  6. Lower extremity exercise program x 10 reps per handout, with supervision - Not met       Outcome: Ongoing (interventions implemented as appropriate)  LTGs remain appropriate. Pt will continue PT POC.    Inés Hargrove, PT  2019

## 2019-04-23 NOTE — ASSESSMENT & PLAN NOTE
59 y.o. male with h/o HTN, arthritis, and cerebral palsy, tobacco abuse, PVD with previous aorto-bifem and L fem/pop. S/p emergent L AKA 3/20/19. Presents with fat necrosis L AKA stump and ischemic R foot rest pain. 7 Days Post-Op from L AKA washout/debridement with wound vac placement and Right AKA on 4/11/19. Left AKA with tracking muscle necrosis and purulence.    - Regular diet  - IV Cipro while in house, will send out on PO cipro to complete total 14 days of ABx  - PRN pain control   - Wound vac changed yesterday - continue changes q 3 days   - HIV & RPR negative     Dispo: Medically stable for discharge today.  Placement pending insurance approval.

## 2019-04-24 LAB
ALBUMIN SERPL BCP-MCNC: 2.6 G/DL (ref 3.5–5.2)
ALP SERPL-CCNC: 132 U/L (ref 55–135)
ALT SERPL W/O P-5'-P-CCNC: 31 U/L (ref 10–44)
ANION GAP SERPL CALC-SCNC: 9 MMOL/L (ref 8–16)
AST SERPL-CCNC: 48 U/L (ref 10–40)
BASOPHILS # BLD AUTO: 0.09 K/UL (ref 0–0.2)
BASOPHILS NFR BLD: 0.9 % (ref 0–1.9)
BILIRUB SERPL-MCNC: 0.2 MG/DL (ref 0.1–1)
BUN SERPL-MCNC: 18 MG/DL (ref 6–20)
CALCIUM SERPL-MCNC: 9.3 MG/DL (ref 8.7–10.5)
CHLORIDE SERPL-SCNC: 100 MMOL/L (ref 95–110)
CO2 SERPL-SCNC: 28 MMOL/L (ref 23–29)
CREAT SERPL-MCNC: 0.7 MG/DL (ref 0.5–1.4)
DIFFERENTIAL METHOD: ABNORMAL
EOSINOPHIL # BLD AUTO: 0.5 K/UL (ref 0–0.5)
EOSINOPHIL NFR BLD: 4.5 % (ref 0–8)
ERYTHROCYTE [DISTWIDTH] IN BLOOD BY AUTOMATED COUNT: 14.9 % (ref 11.5–14.5)
EST. GFR  (AFRICAN AMERICAN): >60 ML/MIN/1.73 M^2
EST. GFR  (NON AFRICAN AMERICAN): >60 ML/MIN/1.73 M^2
GLUCOSE SERPL-MCNC: 99 MG/DL (ref 70–110)
HCT VFR BLD AUTO: 26.8 % (ref 40–54)
HGB BLD-MCNC: 9.2 G/DL (ref 14–18)
IMM GRANULOCYTES # BLD AUTO: 0.11 K/UL (ref 0–0.04)
IMM GRANULOCYTES NFR BLD AUTO: 1 % (ref 0–0.5)
LYMPHOCYTES # BLD AUTO: 2.6 K/UL (ref 1–4.8)
LYMPHOCYTES NFR BLD: 25.1 % (ref 18–48)
MAGNESIUM SERPL-MCNC: 1.9 MG/DL (ref 1.6–2.6)
MCH RBC QN AUTO: 26.8 PG (ref 27–31)
MCHC RBC AUTO-ENTMCNC: 34.3 G/DL (ref 32–36)
MCV RBC AUTO: 78 FL (ref 82–98)
MONOCYTES # BLD AUTO: 0.8 K/UL (ref 0.3–1)
MONOCYTES NFR BLD: 7.4 % (ref 4–15)
NEUTROPHILS # BLD AUTO: 6.4 K/UL (ref 1.8–7.7)
NEUTROPHILS NFR BLD: 61.1 % (ref 38–73)
NRBC BLD-RTO: 0 /100 WBC
PHOSPHATE SERPL-MCNC: 4.6 MG/DL (ref 2.7–4.5)
PLATELET # BLD AUTO: 666 K/UL (ref 150–350)
PMV BLD AUTO: 9.8 FL (ref 9.2–12.9)
POTASSIUM SERPL-SCNC: 4.6 MMOL/L (ref 3.5–5.1)
PROT SERPL-MCNC: 6.8 G/DL (ref 6–8.4)
RBC # BLD AUTO: 3.43 M/UL (ref 4.6–6.2)
SODIUM SERPL-SCNC: 137 MMOL/L (ref 136–145)
WBC # BLD AUTO: 10.48 K/UL (ref 3.9–12.7)

## 2019-04-24 PROCEDURE — 25000003 PHARM REV CODE 250: Performed by: STUDENT IN AN ORGANIZED HEALTH CARE EDUCATION/TRAINING PROGRAM

## 2019-04-24 PROCEDURE — 25000003 PHARM REV CODE 250: Performed by: SURGERY

## 2019-04-24 PROCEDURE — 97530 THERAPEUTIC ACTIVITIES: CPT

## 2019-04-24 PROCEDURE — 99222 PR INITIAL HOSPITAL CARE,LEVL II: ICD-10-PCS | Mod: ,,, | Performed by: NURSE PRACTITIONER

## 2019-04-24 PROCEDURE — 36415 COLL VENOUS BLD VENIPUNCTURE: CPT

## 2019-04-24 PROCEDURE — 99222 1ST HOSP IP/OBS MODERATE 55: CPT | Mod: ,,, | Performed by: NURSE PRACTITIONER

## 2019-04-24 PROCEDURE — 80053 COMPREHEN METABOLIC PANEL: CPT

## 2019-04-24 PROCEDURE — 11000001 HC ACUTE MED/SURG PRIVATE ROOM

## 2019-04-24 PROCEDURE — 83735 ASSAY OF MAGNESIUM: CPT

## 2019-04-24 PROCEDURE — 97542 WHEELCHAIR MNGMENT TRAINING: CPT

## 2019-04-24 PROCEDURE — 84100 ASSAY OF PHOSPHORUS: CPT

## 2019-04-24 PROCEDURE — 97535 SELF CARE MNGMENT TRAINING: CPT

## 2019-04-24 PROCEDURE — 85025 COMPLETE CBC W/AUTO DIFF WBC: CPT

## 2019-04-24 RX ADMIN — OXYCODONE HYDROCHLORIDE 10 MG: 10 TABLET ORAL at 02:04

## 2019-04-24 RX ADMIN — OXYCODONE HYDROCHLORIDE 10 MG: 10 TABLET ORAL at 01:04

## 2019-04-24 RX ADMIN — SENNOSIDES AND DOCUSATE SODIUM 1 TABLET: 8.6; 5 TABLET ORAL at 08:04

## 2019-04-24 RX ADMIN — METOPROLOL SUCCINATE 50 MG: 50 TABLET, EXTENDED RELEASE ORAL at 08:04

## 2019-04-24 RX ADMIN — OXYCODONE HYDROCHLORIDE 10 MG: 10 TABLET ORAL at 05:04

## 2019-04-24 RX ADMIN — POLYETHYLENE GLYCOL 3350 17 G: 17 POWDER, FOR SOLUTION ORAL at 08:04

## 2019-04-24 RX ADMIN — PREGABALIN 225 MG: 150 CAPSULE ORAL at 09:04

## 2019-04-24 RX ADMIN — OXYCODONE HYDROCHLORIDE 10 MG: 10 TABLET ORAL at 09:04

## 2019-04-24 RX ADMIN — ZOLPIDEM TARTRATE 5 MG: 5 TABLET ORAL at 09:04

## 2019-04-24 RX ADMIN — CIPROFLOXACIN HYDROCHLORIDE 500 MG: 500 TABLET, FILM COATED ORAL at 08:04

## 2019-04-24 RX ADMIN — CIPROFLOXACIN HYDROCHLORIDE 500 MG: 500 TABLET, FILM COATED ORAL at 09:04

## 2019-04-24 RX ADMIN — OXYCODONE HYDROCHLORIDE 10 MG: 10 TABLET ORAL at 08:04

## 2019-04-24 NOTE — CONSULTS
[facility]  Physical Medicine & Rehab  Consult Note    Patient Name: Pranay Colindres  MRN: [id number]  Admission Date: 4/10/2019  Hospital Length of Stay: 14 days  Attending Physician: Thalia Moreno MD     Inpatient consult to Physical Medicine & Rehabilitation  Consult performed by: Ximena Van NP  Consult requested by:  Thalia Moreno MD    Collaborating Physician: Leidy Vilchis MD  Reason for Consult:  Assess rehabilitation needs     Consults  Subjective:     Principal Problem: Critical lower limb ischemia    HPI: Pranay Colindres is a 59-year-old male with PMHx of HTN, arthritis, and cerebral palsy, tobacco abuse, and recent admission and s/p L AKA on 3/20/19. Patient presented 4/10/19 with fat necrosis L AKA stump and ischemic R foot rest pain. 8 Days Post-Op from L AKA washout/debridement with wound vac placement and Right AKA on 4/11/19. Left AKA with tracking muscle necrosis and purulence. Completed 14 days of Cipro.     Functional History: Patient lives in [place] with brother in a single story home with 3 steps to enter.  Prior to admission, (I). DME: RW, cane.     Hospital Course:   4/24/19: Participated w/ PT & OT. Bed mobility min-modA. Bed to chair transfers maxA. Feeding set up. Grooming (I). Bathing CGA. UBD modA. Pt propelled Standard wheelchair x 150 feet on Level tile with  Bilateral upper extremity with CGA.    Past Medical History:   Diagnosis Date    Allergy     Arthritis     Hypertension     Neuropathy      Past Surgical History:   Procedure Laterality Date    ABDOMINAL SURGERY      AMPUTATION      right foot 5th digit    AMPUTATION, ABOVE KNEE Right 4/11/2019    Performed by [facility] at [facility] OR 2ND FLR    AMPUTATION, ABOVE KNEE Left 3/20/2019    Performed by Eligio Olmos MD at [facility] OR 2ND FLR    AMPUTATION, ABOVE KNEE left hip disarticulation Left 4/16/2019    Performed by [facility] at [facility] OR 2ND FLR    APPLICATION, WOUND  VAC Left 2019    [facility] at [facility] OR 2ND [facility]LR    COLONOSCOPY  2018    COLONOSCOPY N/A 2018    Performed by [facility]elan [facility] at [facility] ENDO    DEBRIDEMENT, WOUND Left 2019    Performed by [facility] at [facility] OR 2ND FLR    ESOPHAGOGASTRODUODENOSCOPY  2018    ESOPHAGOGASTRODUODENOSCOPY (EGD) N/A 2018    Performed by [facility]an [facility] at [facility] ENDO    HIP SURGERY Bilateral     x2    REPLACEMENT, WOUND VAC  2019    Performed by [facility]no MD at [facility] OR 2ND FLR     Review of patient's allergies indicates:  No Known Allergies    Scheduled Medications:    ciprofloxacin HCl  500 mg Oral Q12H    metoprolol succinate  50 mg Oral Daily    polyethylene glycol  17 g Oral Daily    pregabalin  225 mg Oral QHS    senna-docusate 8.6-50 mg  1 tablet Oral Daily       PRN Medications: bisacodyl, ondansetron, oxyCODONE, oxyCODONE, promethazine (PHENERGAN) IVPB, sodium chloride 0.9%, sodium chloride 0.9%, zolpidem    Family History     Unknown to patient        Tobacco Use    Smoking status: Former Smoker     Types: Cigarettes     Last attempt to quit: 3/12/2019     Years since quittin.1    Smokeless tobacco: Never Used   Substance and Sexual Activity    Alcohol use: Yes     Comment: 1-2    Drug use: No    Sexual activity: Not on file     Review of Systems   Constitutional: Negative for fatigue and fever.   HENT: Negative for trouble swallowing and voice change.    Respiratory: Negative for cough and shortness of breath.    Cardiovascular: Negative for chest pain and leg swelling.   Genitourinary: Negative for difficulty urinating and flank pain.   Musculoskeletal: Negative for back pain and gait problem.   Skin: Negative for color change and rash.   Neurological: Positive for weakness. Negative for speech difficulty.   Psychiatric/Behavioral: Negative for agitation and confusion.     Objective:     Vital Signs (Most  Recent):  Temp: 98.8 °F (37.1 °C) (04/24/19 1537)  Pulse: 98 (04/24/19 1537)  Resp: 18 (04/24/19 1537)  BP: 129/74 (04/24/19 1537)  SpO2: 98 % (04/24/19 1537)    Vital Signs (24h Range):  Temp:  [97.4 °F (36.3 °C)-98.8 °F (37.1 °C)] 98.8 °F (37.1 °C)  Pulse:  [] 98  Resp:  [18-20] 18  SpO2:  [95 %-100 %] 98 %  BP: (103-132)/(68-86) 129/74     Body mass index is 22.5 kg/m².    Physical Exam   Constitutional: He appears well-developed and well-nourished.   HENT:   Head: Normocephalic and atraumatic.   Eyes: Pupils are equal, round, and reactive to light. EOM are normal. Right eye exhibits no discharge. Left eye exhibits no discharge.   Neck: Neck supple.   Pulmonary/Chest: Effort normal. No respiratory distress.   Musculoskeletal: He exhibits deformity. He exhibits no edema or tenderness.   S/p bilateral AKAs   Neurological: He is alert.   - oriented to time and place   Skin: Skin is warm and dry.   Psychiatric: He has a normal mood and affect. His behavior is normal.   Vitals Reviewed    Diagnostic Results:   Labs: Reviewed  ECG: Reviewed  CT: Reviewed    Assessment/Plan:     * Critical lower limb ischemia  -  L AKA washout/debridement with wound vac placement and Right AKA on 4/11/19.   - Left AKA with tracking muscle necrosis and purulence. Completed 14 days of Cipro.     Sinus tachycardia  - primary team managing     Impaired mobility and activities of daily living  Recommendations  -  Early mobility, hip AROM, and OOB in chair at least 3 hours per day  -  PT/OT evaluate and treat  -  Monitor for phantom limb pain and/or sensation  -  Pain management  -  Wound care and edema control  -  Monitor for and prevent skin breakdown and pressure ulcers  · Early mobility, repositioning/weight shifting every 20-30 minutes when sitting, turn patient every 2 hours, proper mattress/overlay and chair cushioning, pressure relief/heel protector boots  -  Anticontracture management  · Firm mattress, lying prone 15 minutes  TID, and knee extension while resting  -  Reviewed discharge options (IP rehab, SNF, HH therapy, and OP therapy)  -  Follow up in PM&R clinic 2-4 weeks post-op for postamputation and preprosthetic care      Recommend Inpatient Rehab per Dr. Vilchis.     Thank you for your consult.     Ximena Van NP  Department of Physical Medicine & Rehab  [facility]

## 2019-04-24 NOTE — PLAN OF CARE
Problem: Adult Inpatient Plan of Care  Goal: Plan of Care Review  Outcome: Ongoing (interventions implemented as appropriate)  PT resting in bed comfortably. IV sites intact and free of redness.. fall precautions maintained no falls noted, call light in reach, bed locked, non skid socks on while out of bed pt instructed to call for assistance, skin integrity maintained pt independent with positioning, c/o pain managed with prn meds, no other complaints or concerns. Pt encouraged to to use IS, demonstrates understanding. Will continue to monitor

## 2019-04-24 NOTE — CONSULTS
Inpatient consult to Physical Medicine Rehab  Consult performed by: Ximena Van NP  Consult ordered by: Eligio Olmos MD  Reason for consult: Assess rehab needs      Reviewed patient history and current admission.  Rehab team following.  Full consult to follow.    ANGEL Rico, FNP-C  Physical Medicine & Rehabilitation   04/24/2019  Spectralink: [id number]

## 2019-04-24 NOTE — PLAN OF CARE
04/24/19 1616   Post-Acute Status   Post-Acute Authorization Placement       Pt has auth to go to [facility]. MD in rehab wants a PMR consult done as well as dressing changes on the wound vac before they accept the patient. Pt can go to [facility] tomorrow 4/25/19.  SW in contact with CM and Medical staff. Will continue to follow and offer support as needed.     Bogdan Kelley, OWEN  [facility]   Ext. [phone]

## 2019-04-24 NOTE — PLAN OF CARE
Problem: Occupational Therapy Goal  Goal: Occupational Therapy Goal  Re-Eval performed on 4/18/2019  Goals to be met by: 4/25/2019     Patient will increase functional independence with ADLs by performing:    UE Dressing with Contact Guard Assistance.  LE Dressing with Moderate Assistance.  Grooming while sitting up in w/c with Modified [place].  Toileting from bedside commode with Minimal Assistance for hygiene and clothing management.   Supine to sit with Contact Guard Assistance. -- Met (4/22)  Toilet transfer to bedside commode via scooting technique with Moderate Assistance.       Outcome: Ongoing (interventions implemented as appropriate)  Goals remain appropriate, continue with OT POC.    SAMY Lopez  4/24/2019  Rehab Services

## 2019-04-24 NOTE — SUBJECTIVE & OBJECTIVE
Interval History:  No issues overnight      Medications:  Continuous Infusions:  Scheduled Meds:   ciprofloxacin HCl  500 mg Oral Q12H    metoprolol succinate  50 mg Oral Daily    polyethylene glycol  17 g Oral Daily    pregabalin  225 mg Oral QHS    senna-docusate 8.6-50 mg  1 tablet Oral Daily     PRN Meds:bisacodyl, ondansetron, oxyCODONE, oxyCODONE, promethazine (PHENERGAN) IVPB, sodium chloride 0.9%, sodium chloride 0.9%, zolpidem     Objective:     Vital Signs (Most Recent):  Temp: 97.8 °F (36.6 °C) (04/24/19 1200)  Pulse: 105 (04/24/19 1200)  Resp: 18 (04/24/19 1200)  BP: 114/73 (04/24/19 1200)  SpO2: 95 % (04/24/19 1200) Vital Signs (24h Range):  Temp:  [97.4 °F (36.3 °C)-98.4 °F (36.9 °C)] 97.8 °F (36.6 °C)  Pulse:  [] 105  Resp:  [18-20] 18  SpO2:  [95 %-100 %] 95 %  BP: (103-132)/(68-86) 114/73         Physical Exam   Constitutional: He is oriented to person, place, and time. He appears well-developed and well-nourished.   Neck: No JVD present. No tracheal deviation present.   Cardiovascular: Normal rate and regular rhythm.   Pulmonary/Chest: Effort normal. No respiratory distress.   Abdominal: Soft. He exhibits no distension. There is no tenderness. There is no guarding.   Musculoskeletal: He exhibits no edema.   L AKA with wound vac, minimal edema, pink and healthy appearing tissue   R AKA incision c/d/i    Neurological: He is alert and oriented to person, place, and time.   Skin: Skin is warm and dry.   Nursing note and vitals reviewed.      Significant Labs:  CBC:   Recent Labs   Lab 04/24/19  0441   WBC 10.48   RBC 3.43*   HGB 9.2*   HCT 26.8*   *   MCV 78*   MCH 26.8*   MCHC 34.3     CMP:   Recent Labs   Lab 04/24/19  0441   GLU 99   CALCIUM 9.3   ALBUMIN 2.6*   PROT 6.8      K 4.6   CO2 28      BUN 18   CREATININE 0.7   ALKPHOS 132   ALT 31   AST 48*   BILITOT 0.2       Significant Diagnostics:  I have reviewed all pertinent imaging results/findings within the past 24  hours.

## 2019-04-24 NOTE — ASSESSMENT & PLAN NOTE
-  L AKA washout/debridement with wound vac placement and Right AKA on 4/11/19.   - Left AKA with tracking muscle necrosis and purulence. Completed 14 days of Cipro.

## 2019-04-24 NOTE — PT/OT/SLP PROGRESS
Occupational Therapy   Treatment    Name: Pranay Colindres  MRN: [id number]  Admitting Diagnosis:  Critical lower limb ischemia  8 Days Post-Op    Recommendations:     Discharge Recommendations: rehabilitation facility  Discharge Equipment Recommendations:  other (see comments)(drop arm commode)  Barriers to discharge:  None    Assessment:     Pranay Colindres is a 59 y.o. male with a medical diagnosis of Critical lower limb ischemia.  He presents with decline in ADLs and functional mobility. Performance deficits affecting function are weakness, impaired endurance, decreased lower extremity function, impaired self care skills, impaired balance, orthopedic precautions, edema, impaired skin, pain, impaired functional mobilty.     Rehab Prognosis:  Good; patient would benefit from acute skilled OT services to address these deficits and reach maximum level of function.       Plan:     Patient to be seen 4 x/week to address the above listed problems via self-care/home management, therapeutic activities, therapeutic exercises  · Plan of Care Expires: 05/17/19  · Plan of Care Reviewed with: patient    Subjective     Pain/Comfort:  · Pain Rating 1: 6/10  · Location - Side 1: Bilateral  · Location 1: other (see comments)(residual limb)  · Pain Addressed 1: Reposition, Pre-medicate for activity  · Pain Rating Post-Intervention 1: 6/10    Objective:     Communicated with: RN prior to session.  Patient found supine with telemetry, wound vac upon OT entry to room.    General Precautions: Standard, fall   Orthopedic Precautions:N/A   Braces:       Occupational Performance:     Bed Mobility:    · Patient completed Scooting/Bridging with moderate assistance to maximal assistance  · Patient completed Supine to Sit with moderate assistance     Functional Mobility/Transfers:  · Patient completed Bed <> Chair Transfer using scoot technique with maximal assistance with no assistive device  · Functional Mobility: Pt able to scoot to w/c  when positioned facing/touching bed. He does need help to scoot and turn. Educated on importance of moving hips closer to the bed before sitting to minimize need for scooting in upright position.     Activities of Daily Living:  · Feeding:  set up sitting with support.    · Grooming: pt is independent with task of brushing teeth, however when sitting without support needs CGA  for trunk control while performing dynamic task     · Bathing: contact guard assistance for performing task sitting unsupported  · Upper Body Dressing: moderate assistance while sitting unsupported      [facility] 6 Click ADL: 16    Treatment & Education:  · Pt educated on role of OT in acute care setting.   · Assisted with ADLs and functional mobility with assist levels noted above.      Patient left up in w/c with all lines intactEducation:      GOALS:   Multidisciplinary Problems     Occupational Therapy Goals        Problem: Occupational Therapy Goal    Goal Priority Disciplines Outcome Interventions   Occupational Therapy Goal     OT, PT/OT Ongoing (interventions implemented as appropriate)    Description:  Re-Eval performed on 4/18/2019  Goals to be met by: 4/25/2019     Patient will increase functional independence with ADLs by performing:    UE Dressing with Contact Guard Assistance.  LE Dressing with Moderate Assistance.  Grooming while sitting up in w/c with Modified [place].  Toileting from bedside commode with Minimal Assistance for hygiene and clothing management.   Supine to sit with Contact Guard Assistance. -- Met (4/22)  Toilet transfer to bedside commode via scooting technique with Moderate Assistance.                        Time Tracking:     OT Date of Treatment: 04/24/19  OT Start Time: 1010  OT Stop Time: 1050  OT Total Time (min): 40 min    Billable Minutes:Self Care/Home Management 25  Therapeutic Activity 15    SAMY Rogers  4/24/2019

## 2019-04-24 NOTE — HPI
Pranay Colindres is a 59-year-old male with PMHx of HTN, arthritis, and cerebral palsy, tobacco abuse, and recent admission and s/p L AKA on 3/20/19. Patient presented 4/10/19 with fat necrosis L AKA stump and ischemic R foot rest pain. 8 Days Post-Op from L AKA washout/debridement with wound vac placement and Right AKA on 4/11/19. Left AKA with tracking muscle necrosis and purulence. Completed 14 days of Cipro.     Functional History: Patient lives in [place] with brother in a single story home with 3 steps to enter.  Prior to admission, (I). DME: RW, cane.

## 2019-04-24 NOTE — HOSPITAL COURSE
4/24/19: Participated w/ PT & OT. Bed mobility min-modA. Bed to chair transfers maxA. Feeding set up. Grooming (I). Bathing CGA. UBD modA. Pt propelled Standard wheelchair x 150 feet on Level tile with  Bilateral upper extremity with CGA.

## 2019-04-24 NOTE — PLAN OF CARE
Rehab auth pending since Monday. Plan is to discharge to [facility] when auth obtained. If denied, will speak to primary team to set up [facility].       04/24/19 0978   Discharge Reassessment   Assessment Type Discharge Planning Reassessment   Discharge Plan A Rehab   Discharge Plan B Skilled Nursing Facility

## 2019-04-24 NOTE — PROGRESS NOTES
[facility]  Vascular Surgery  Progress Note    Patient Name: Pranay Colindres  MRN: [id number]  Admission Date: 4/10/2019  Primary Care Provider: Jenaro Torres MD    Subjective:       Post-Op Info:  Procedure(s) (LRB):  AMPUTATION, ABOVE KNEE left hip disarticulation (Left)  REPLACEMENT, WOUND VAC   8 Days Post-Op     Interval History:  No issues overnight      Medications:  Continuous Infusions:  Scheduled Meds:   ciprofloxacin HCl  500 mg Oral Q12H    metoprolol succinate  50 mg Oral Daily    polyethylene glycol  17 g Oral Daily    pregabalin  225 mg Oral QHS    senna-docusate 8.6-50 mg  1 tablet Oral Daily     PRN Meds:bisacodyl, ondansetron, oxyCODONE, oxyCODONE, promethazine (PHENERGAN) IVPB, sodium chloride 0.9%, sodium chloride 0.9%, zolpidem     Objective:     Vital Signs (Most Recent):  Temp: 97.8 °F (36.6 °C) (04/24/19 1200)  Pulse: 105 (04/24/19 1200)  Resp: 18 (04/24/19 1200)  BP: 114/73 (04/24/19 1200)  SpO2: 95 % (04/24/19 1200) Vital Signs (24h Range):  Temp:  [97.4 °F (36.3 °C)-98.4 °F (36.9 °C)] 97.8 °F (36.6 °C)  Pulse:  [] 105  Resp:  [18-20] 18  SpO2:  [95 %-100 %] 95 %  BP: (103-132)/(68-86) 114/73         Physical Exam   Constitutional: He is oriented to person, place, and time. He appears well-developed and well-nourished.   Neck: No JVD present. No tracheal deviation present.   Cardiovascular: Normal rate and regular rhythm.   Pulmonary/Chest: Effort normal. No respiratory distress.   Abdominal: Soft. He exhibits no distension. There is no tenderness. There is no guarding.   Musculoskeletal: He exhibits no edema.   L AKA with wound vac, minimal edema, pink and healthy appearing tissue   R AKA incision c/d/i    Neurological: He is alert and oriented to person, place, and time.   Skin: Skin is warm and dry.   Nursing note and vitals reviewed.      Significant Labs:  CBC:   Recent Labs   Lab 04/24/19  0441   WBC 10.48   RBC 3.43*   HGB 9.2*   HCT 26.8*   *    MCV 78*   MCH 26.8*   MCHC 34.3     CMP:   Recent Labs   Lab 04/24/19  0441   GLU 99   CALCIUM 9.3   ALBUMIN 2.6*   PROT 6.8      K 4.6   CO2 28      BUN 18   CREATININE 0.7   ALKPHOS 132   ALT 31   AST 48*   BILITOT 0.2       Significant Diagnostics:  I have reviewed all pertinent imaging results/findings within the past 24 hours.    Assessment/Plan:     * Critical lower limb ischemia  59 y.o. male with h/o HTN, arthritis, and cerebral palsy, tobacco abuse, PVD with previous aorto-bifem and L fem/pop. S/p emergent L AKA 3/20/19. Presents with fat necrosis L AKA stump and ischemic R foot rest pain. 8 Days Post-Op from L AKA washout/debridement with wound vac placement and Right AKA on 4/11/19. Left AKA with tracking muscle necrosis and purulence.    - Regular diet  - PO cipro to complete total 14 days of ABx  - PRN pain control   - Wound vac changed 4/22/19 - continue changes q 3 days   - HIV & RPR negative     Dispo: Medically stable for discharge today.  Placement pending insurance approval.         Zakia Lovett MD  Vascular Surgery  [facility]

## 2019-04-24 NOTE — SUBJECTIVE & OBJECTIVE
Past Medical History:   Diagnosis Date    Allergy     Arthritis     Hypertension     Neuropathy      Past Surgical History:   Procedure Laterality Date    ABDOMINAL SURGERY      AMPUTATION      right foot 5th digit    AMPUTATION, ABOVE KNEE Right 2019    Performed by Thalia Moreno MD at [facility] OR 2ND FLR    AMPUTATION, ABOVE KNEE Left 3/20/2019    Performed by Eligio Olmos MD at [facility] OR [facility]LR    AMPUTATION, ABOVE KNEE left hip disarticulation Left 2019    Performed by Thalia Moreno MD at [facility] OR [facility]    APPLICATION, WOUND VAC Left 2019    Performed by Thalia Moreno MD at [facility] OR [address] [place] [address]    COLONOSCOPY  2018    COLONOSCOPY N/A 2018    Performed by Jeramy [facility]elan MD at [facility] ENDO    DEBRIDEMENT, WOUND Left 2019    Performed by Thalia Moreno MD at [facility] OR [address] [place] [address]    ESOPHAGOGASTRODUODENOSCOPY  2018    ESOPHAGOGASTRODUODENOSCOPY (EGD) N/A 2018    Performed by Jeramy Castelan MD at [facility] ENDO    HIP SURGERY Bilateral     x2    REPLACEMENT, WOUND VAC  2019    Performed by Thalia Moreno MD at [facility] OR [address] [place] [address]     Review of patient's allergies indicates:  No Known Allergies    Scheduled Medications:    ciprofloxacin HCl  500 mg Oral Q12H    metoprolol succinate  50 mg Oral Daily    polyethylene glycol  17 g Oral Daily    pregabalin  225 mg Oral QHS    senna-docusate 8.6-50 mg  1 tablet Oral Daily       PRN Medications: bisacodyl, ondansetron, oxyCODONE, oxyCODONE, promethazine (PHENERGAN) IVPB, sodium chloride 0.9%, sodium chloride 0.9%, zolpidem    Family History     None        Tobacco Use    Smoking status: Former Smoker     Types: Cigarettes     Last attempt to quit: 3/12/2019     Years since quittin.1    Smokeless tobacco: Never Used   Substance and Sexual Activity    Alcohol use: Yes     Comment: 1-2    Drug use: No    Sexual activity: Not on file     Review of Systems   Constitutional: Negative for fatigue  and fever.   HENT: Negative for trouble swallowing and voice change.    Respiratory: Negative for cough and shortness of breath.    Cardiovascular: Negative for chest pain and leg swelling.   Gastrointestinal: Negative for abdominal distention and abdominal pain.   Genitourinary: Negative for difficulty urinating and flank pain.   Musculoskeletal: Negative for back pain and gait problem.   Skin: Negative for color change and rash.   Neurological: Positive for weakness. Negative for speech difficulty.   Psychiatric/Behavioral: Negative for agitation and confusion.     Objective:     Vital Signs (Most Recent):  Temp: 98.8 °F (37.1 °C) (04/24/19 1537)  Pulse: 98 (04/24/19 1537)  Resp: 18 (04/24/19 1537)  BP: 129/74 (04/24/19 1537)  SpO2: 98 % (04/24/19 1537)    Vital Signs (24h Range):  Temp:  [97.4 °F (36.3 °C)-98.8 °F (37.1 °C)] 98.8 °F (37.1 °C)  Pulse:  [] 98  Resp:  [18-20] 18  SpO2:  [95 %-100 %] 98 %  BP: (103-132)/(68-86) 129/74     Body mass index is 22.5 kg/m².    Physical Exam   Constitutional: He appears well-developed and well-nourished.   HENT:   Head: Normocephalic and atraumatic.   Eyes: Pupils are equal, round, and reactive to light. EOM are normal. Right eye exhibits no discharge. Left eye exhibits no discharge.   Neck: Neck supple.   Pulmonary/Chest: Effort normal. No respiratory distress.   Musculoskeletal: He exhibits deformity. He exhibits no edema or tenderness.   S/p bilateral AKAs   Neurological: He is alert.   - oriented to time and place   Skin: Skin is warm and dry.   Psychiatric: He has a normal mood and affect. His behavior is normal.     NEUROLOGICAL EXAMINATION:     CRANIAL NERVES     CN III, IV, VI   Pupils are equal, round, and reactive to light.  Extraocular motions are normal.       Diagnostic Results:   Labs: Reviewed  ECG: Reviewed  CT: Reviewed

## 2019-04-24 NOTE — PLAN OF CARE
Problem: Physical Therapy Goal  Goal: Physical Therapy Goal  Goals to be met by: 19     Patient will increase functional independence with mobility by performin. Supine to sit with Contact Guard Assistance - Met  Revised goal: supine to sit with supervision-not met  2. Sit to supine with Contact Guard Assistance - Met 2019  Sit to supine with supervision-not met  3. Bed to chair transfer with Minimal assistance via slideboard into wheelchair - Not met  4. Wheelchair propulsion x 500 feet with [place] using bilateral upper extremities - Not met  5. Sitting at edge of bed x 10 minutes with Contact Guard Assistance without (B) UE usage - Not met  6. Lower extremity exercise program x 10 reps per handout, with supervision - Not met        Outcome: Ongoing (interventions implemented as appropriate)  Pt's goals revised as needed and pt will continue to benefit from skilled PT services to work towards improved functional mobility including: bed mobility, transfers, and w/c mobility.   Tanisha Bravo, PT  2019

## 2019-04-24 NOTE — PT/OT/SLP PROGRESS
"Physical Therapy Treatment    Patient Name:  Pranay Colindres   MRN:  [id number]    Recommendations:     Discharge Recommendations:  rehabilitation facility   Discharge Equipment Recommendations: commode(drop arm commode)   Barriers to discharge: Decreased caregiver support requires assist for mobility    Assessment:     Pranay Colindres is a 59 y.o. male admitted with a medical diagnosis of Critical lower limb ischemia.  He presents with the following impairments/functional limitations:  weakness, impaired endurance, impaired balance, decreased lower extremity function, pain, impaired functional mobilty, orthopedic precautions. Pt with difficulty scooting in the bed once sitting and with sitting balance without UE support.    Rehab Prognosis: Good; patient would benefit from acute skilled PT services to address these deficits and reach maximum level of function.    Recent Surgery: Procedure(s) (LRB):  AMPUTATION, ABOVE KNEE left hip disarticulation (Left)  REPLACEMENT, WOUND VAC 8 Days Post-Op    Plan:     During this hospitalization, patient to be seen 4 x/week to address the identified rehab impairments via therapeutic activities, therapeutic exercises, neuromuscular re-education, wheelchair management/training and progress toward the following goals:    · Plan of Care Expires:  05/18/19    Subjective   "I am glad to be getting out of the bed"    Pain/Comfort:  · Pain Rating 1: 6/10  · Location - Side 1: Bilateral  · Location - Orientation 1: generalized  · Location 1: leg(residual limbs)  · Pain Addressed 1: Reposition, Pre-medicate for activity  · Pain Rating Post-Intervention 1: 6/10      Objective:     Communicated with nurse prior to session.  Patient found sitting up in bed with OT assist with telemetry, wound vac upon PT entry to room.     General Precautions: Standard, fall   Orthopedic Precautions:N/A   Braces: N/A     Functional Mobility:  · Bed Mobility:     · Sit to Supine: minimum " assistance  · Transfers:     · Bed to/from wheelChair with arm rest removed: maximal assistance with  no AD  using  Scoot Pivot  · Wheelchair Propulsion:  Pt propelled Standard wheelchair x 150 feet on Level tile with  Bilateral upper extremity with Contact Guard Assistance. PT stood behind pt to guard in case of posterior tipping/ pt also has anti tippers on the back of his chair. (Pt states he would tip posterior at times previously)    AM-PAC 6 CLICK MOBILITY  Turning over in bed (including adjusting bedclothes, sheets and blankets)?: 3  Sitting down on and standing up from a chair with arms (e.g., wheelchair, bedside commode, etc.): 1  Moving from lying on back to sitting on the side of the bed?: 3  Moving to and from a bed to a chair (including a wheelchair)?: 2  Need to walk in hospital room?: 1  Climbing 3-5 steps with a railing?: 1  Basic Mobility Total Score: 11     Therapeutic Activities and Exercises:   pt sat upright in bed with B UE support and required max assist to scoot to the EOB to perform transfer.    Patient left supine with all lines intact, call button in reach and nurse notified..    GOALS:   Multidisciplinary Problems     Physical Therapy Goals        Problem: Physical Therapy Goal    Goal Priority Disciplines Outcome Goal Variances Interventions   Physical Therapy Goal     PT, PT/OT Ongoing (interventions implemented as appropriate)     Description:  Goals to be met by: 19     Patient will increase functional independence with mobility by performin. Supine to sit with Contact Guard Assistance - Met  Revised goal: supine to sit with supervision-not met  2. Sit to supine with Contact Guard Assistance - Met 2019  Sit to supine with supervision-not met  3. Bed to chair transfer with Minimal assistance via slideboard into wheelchair - Not met  4. Wheelchair propulsion x 500 feet with [place] using bilateral upper extremities - Not met  5. Sitting at edge of bed x 10 minutes  with Contact Guard Assistance without (B) UE usage - Not met  6. Lower extremity exercise program x 10 reps per handout, with supervision - Not met                          Time Tracking:     PT Received On: 04/24/19  PT Start Time: 1040     PT Stop Time: 1105  PT Total Time (min): 25 min     Billable Minutes: Therapeutic Activity 15 and Train/Wheelchair Management 10    Treatment Type: Treatment  PT/PTA: PT     PTA Visit Number: 0     Tanisha Bravo, PT  04/24/2019

## 2019-04-24 NOTE — ASSESSMENT & PLAN NOTE
59 y.o. male with h/o HTN, arthritis, and cerebral palsy, tobacco abuse, PVD with previous aorto-bifem and L fem/pop. S/p emergent L AKA 3/20/19. Presents with fat necrosis L AKA stump and ischemic R foot rest pain. 8 Days Post-Op from L AKA washout/debridement with wound vac placement and Right AKA on 4/11/19. Left AKA with tracking muscle necrosis and purulence.    - Regular diet  - PO cipro to complete total 14 days of ABx  - PRN pain control   - Wound vac changed 4/22/19 - continue changes q 3 days   - HIV & RPR negative     Dispo: Medically stable for discharge today.  Placement pending insurance approval.

## 2019-04-24 NOTE — PLAN OF CARE
[facility]     Department of Hospital Medicine     [address] [place] [address]     [phone] after hours  [phone] fax       REHAB FACILITY ORDERS    04/25/2019    Admit to Rehab Facility:  Regular Bed                                                  Diagnoses:  Active Hospital Problems    Diagnosis  POA    *Critical lower limb ischemia [I99.8]  Yes    Sinus tachycardia [R00.0]  Unknown    Impaired mobility and activities of daily living [Z74.09]  Yes      Resolved Hospital Problems   No resolved problems to display.       Patient is homebound due to:  Critical lower limb ischemia    Allergies:Review of patient's allergies indicates:  No Known Allergies    Vitals:       Once weekly         Diet:   Supplement:  1 can every three times a day with meals                         Type:  House       Acitivities:     - Up in a chair each morning as tolerated   - Ambulate with assistance to bathroom   - Weight bearing: Not able to bear weight on bilateral AKA stumps.     LABS:  Per facility protocol   CMP, CBC each month for 3 months   PT-INR each week for 1 month then monthly   Pre-albumin each month for 3 months   TSH every year       Nursing Precautions:    - Aspiration precautions:             - Total assistance with meals            -  Upright 90 degrees befor during and after meals             -  Suction at bedside          - Fall precautions per nursing home protocol   - Seizure precaution per long-term protocol   - Decubitus precautions:        -  for positioning   - Pressure reducing foam mattress   - Turn patient every two hours. Use wedge pillows to anchor patient    CONSULT                      Physical Therapy                Occupational Therapy     MISCELLANEOUS CARE:              Wound Vac: Change wound vac every 3 days. Next change due 4/28/19. Please give pain medication before change.               Apply black sponge to L AKA stump.  Set wound vac suction to -125mmHg.    There is no need for cleaning the wound in between wound vac changes. The wound vac keeps the wound clean. There is no cleaning solution that needs to be obtained or used on the wound.    The wound vac will continue to be changed until his follow up visit with Dr. Moreno.      Routine Skin for Bedridden Patients:  Apply moisture barrier cream to all skin folds and wet areas in perineal area daily and after baths and all bowel movements.               Medications with STOP DATE:         Ciprofloxacin HCl 500mg tablet Q12 hours, stop date: 4/27/19              Aftab Colindres   Home Medication Instructions ELIESER:[id number]    Printed on:04/24/19 0872   Medication Information                      amLODIPine (NORVASC) 10 MG tablet  Take 1 tablet (10 mg total) by mouth once daily.             apixaban (ELIQUIS) 2.5 mg Tab  Take 1 tablet (2.5 mg total) by mouth 2 (two) times daily.             aspirin (ECOTRIN) 81 MG EC tablet  Take 1 tablet (81 mg total) by mouth once daily.             atorvastatin (LIPITOR) 40 MG tablet  Take 1 tablet (40 mg total) by mouth once daily.             gabapentin (NEURONTIN) 300 MG capsule  Take 1 capsule (300 mg total) by mouth every evening.             HYDROcodone-acetaminophen (NORCO) 7.5-325 mg per tablet  Take 1 tablet by mouth every 4 (four) hours as needed for Pain.    Oxycodone-acetaminophen (Percocet)  mg per tablet  Take 1 tablet by mouth 30 minutes prior to wound vac change.             latanoprost 0.005 % ophthalmic solution  1 drop in each eye daily               metoprolol succinate (TOPROL-XL) 50 MG 24 hr tablet  Take 50 mg by mouth once daily.             pantoprazole (PROTONIX) 40 MG tablet  1 pill by mouth daily              senna (SENOKOT) 8.6 mg tablet  Take 1 tablet by mouth once daily.             traMADol (ULTRAM) 50 mg tablet  Take 1 tablet (50 mg total) by mouth every 6 (six) hours as needed for Pain.                        _________________________________  Zakia Lovett MD  04/25/2019

## 2019-04-25 VITALS
RESPIRATION RATE: 18 BRPM | TEMPERATURE: 98 F | HEART RATE: 108 BPM | BODY MASS INDEX: 22.43 KG/M2 | HEIGHT: 68 IN | WEIGHT: 148 LBS | OXYGEN SATURATION: 96 % | DIASTOLIC BLOOD PRESSURE: 73 MMHG | SYSTOLIC BLOOD PRESSURE: 108 MMHG

## 2019-04-25 LAB
ALBUMIN SERPL BCP-MCNC: 3 G/DL (ref 3.5–5.2)
ALP SERPL-CCNC: 150 U/L (ref 55–135)
ALT SERPL W/O P-5'-P-CCNC: 30 U/L (ref 10–44)
ANION GAP SERPL CALC-SCNC: 12 MMOL/L (ref 8–16)
AST SERPL-CCNC: 52 U/L (ref 10–40)
BACTERIA BLD CULT: NORMAL
BACTERIA BLD CULT: NORMAL
BASOPHILS # BLD AUTO: 0.07 K/UL (ref 0–0.2)
BASOPHILS NFR BLD: 0.8 % (ref 0–1.9)
BILIRUB SERPL-MCNC: 0.2 MG/DL (ref 0.1–1)
BUN SERPL-MCNC: 19 MG/DL (ref 6–20)
CALCIUM SERPL-MCNC: 9.8 MG/DL (ref 8.7–10.5)
CHLORIDE SERPL-SCNC: 100 MMOL/L (ref 95–110)
CO2 SERPL-SCNC: 26 MMOL/L (ref 23–29)
CREAT SERPL-MCNC: 0.7 MG/DL (ref 0.5–1.4)
DIFFERENTIAL METHOD: ABNORMAL
EOSINOPHIL # BLD AUTO: 0.5 K/UL (ref 0–0.5)
EOSINOPHIL NFR BLD: 5.3 % (ref 0–8)
ERYTHROCYTE [DISTWIDTH] IN BLOOD BY AUTOMATED COUNT: 15.2 % (ref 11.5–14.5)
EST. GFR  (AFRICAN AMERICAN): >60 ML/MIN/1.73 M^2
EST. GFR  (NON AFRICAN AMERICAN): >60 ML/MIN/1.73 M^2
GLUCOSE SERPL-MCNC: 105 MG/DL (ref 70–110)
HCT VFR BLD AUTO: 29.4 % (ref 40–54)
HGB BLD-MCNC: 10 G/DL (ref 14–18)
IMM GRANULOCYTES # BLD AUTO: 0.09 K/UL (ref 0–0.04)
IMM GRANULOCYTES NFR BLD AUTO: 1 % (ref 0–0.5)
LYMPHOCYTES # BLD AUTO: 2.9 K/UL (ref 1–4.8)
LYMPHOCYTES NFR BLD: 30.9 % (ref 18–48)
MAGNESIUM SERPL-MCNC: 1.8 MG/DL (ref 1.6–2.6)
MCH RBC QN AUTO: 26.7 PG (ref 27–31)
MCHC RBC AUTO-ENTMCNC: 34 G/DL (ref 32–36)
MCV RBC AUTO: 79 FL (ref 82–98)
MONOCYTES # BLD AUTO: 0.7 K/UL (ref 0.3–1)
MONOCYTES NFR BLD: 7.2 % (ref 4–15)
NEUTROPHILS # BLD AUTO: 5.1 K/UL (ref 1.8–7.7)
NEUTROPHILS NFR BLD: 54.8 % (ref 38–73)
NRBC BLD-RTO: 0 /100 WBC
PHOSPHATE SERPL-MCNC: 4.7 MG/DL (ref 2.7–4.5)
PLATELET # BLD AUTO: 669 K/UL (ref 150–350)
PMV BLD AUTO: 9.7 FL (ref 9.2–12.9)
POTASSIUM SERPL-SCNC: 4.2 MMOL/L (ref 3.5–5.1)
PROT SERPL-MCNC: 7.3 G/DL (ref 6–8.4)
RBC # BLD AUTO: 3.74 M/UL (ref 4.6–6.2)
SODIUM SERPL-SCNC: 138 MMOL/L (ref 136–145)
WBC # BLD AUTO: 9.33 K/UL (ref 3.9–12.7)

## 2019-04-25 PROCEDURE — 25000003 PHARM REV CODE 250: Performed by: STUDENT IN AN ORGANIZED HEALTH CARE EDUCATION/TRAINING PROGRAM

## 2019-04-25 PROCEDURE — 85025 COMPLETE CBC W/AUTO DIFF WBC: CPT

## 2019-04-25 PROCEDURE — 36415 COLL VENOUS BLD VENIPUNCTURE: CPT

## 2019-04-25 PROCEDURE — 25000003 PHARM REV CODE 250: Performed by: SURGERY

## 2019-04-25 PROCEDURE — 83735 ASSAY OF MAGNESIUM: CPT

## 2019-04-25 PROCEDURE — 80053 COMPREHEN METABOLIC PANEL: CPT

## 2019-04-25 PROCEDURE — 84100 ASSAY OF PHOSPHORUS: CPT

## 2019-04-25 RX ORDER — OXYCODONE AND ACETAMINOPHEN 10; 325 MG/1; MG/1
1 TABLET ORAL EVERY 4 HOURS PRN
Qty: 30 TABLET | Refills: 0 | Status: SHIPPED | OUTPATIENT
Start: 2019-04-25

## 2019-04-25 RX ADMIN — METOPROLOL SUCCINATE 50 MG: 50 TABLET, EXTENDED RELEASE ORAL at 08:04

## 2019-04-25 RX ADMIN — OXYCODONE HYDROCHLORIDE 10 MG: 10 TABLET ORAL at 08:04

## 2019-04-25 RX ADMIN — OXYCODONE HYDROCHLORIDE 10 MG: 10 TABLET ORAL at 12:04

## 2019-04-25 RX ADMIN — CIPROFLOXACIN HYDROCHLORIDE 500 MG: 500 TABLET, FILM COATED ORAL at 08:04

## 2019-04-25 RX ADMIN — POLYETHYLENE GLYCOL 3350 17 G: 17 POWDER, FOR SOLUTION ORAL at 08:04

## 2019-04-25 RX ADMIN — OXYCODONE HYDROCHLORIDE 10 MG: 10 TABLET ORAL at 05:04

## 2019-04-25 RX ADMIN — SENNOSIDES AND DOCUSATE SODIUM 1 TABLET: 8.6; 5 TABLET ORAL at 08:04

## 2019-04-25 RX ADMIN — OXYCODONE HYDROCHLORIDE 10 MG: 10 TABLET ORAL at 01:04

## 2019-04-25 NOTE — PROGRESS NOTES
Doing well. No complaints. Ready to go to rehab.    Left side VAC in place  Right AK staples intact - remove staples in clinic Mon May 6    Thalia Moreno MD FACS VI  Vascular & Endovascular Surgery

## 2019-04-25 NOTE — PLAN OF CARE
Problem: Adult Inpatient Plan of Care  Goal: Plan of Care Review  Outcome: Ongoing (interventions implemented as appropriate)  PT resting in bed comfortably. IV site intact and infusing free of redness and irritaio. fall precautions maintained no falls noted, call light in reach, bed locked. skin integrity maintained pt independent with positioning, c/o pain managed with prn meds. Pt says he is excited about todays discharge. pt verbalized understanding of discharge instructions. no other complaints or concerns, will cont to follow careplan

## 2019-04-25 NOTE — NURSING
Dr Lovett alerted me to pts discharge, called and gave report to nurse pierre at extension [phone] ([phone]).  Brenda informed me pt transport would arrive to transport pt at 1630. Pt resting comfortably

## 2019-04-25 NOTE — PLAN OF CARE
Transportation has been arranged through [facility] ([id number]) for w/c transport to [facility]. nurse Morteza [id number] will hand off to assigned nurse @ [phone].  ETA for transport is 4:30pm. Pt has been notified of transfer by [facility].     04/25/19 5746   Post-Acute Status   Post-Acute Authorization Placement

## 2019-04-25 NOTE — NURSING
Discussed discharge instructions with pt. Pt verbalized understanding  Provided printed Dc instructions.  Removed IV access, dressed pt.  Pt states, no concerns at this time. Pt transported [facility] to [facility], [place], [place]

## 2019-04-25 NOTE — SUBJECTIVE & OBJECTIVE
Interval History:  No issues overnight      Medications:  Continuous Infusions:  Scheduled Meds:   ciprofloxacin HCl  500 mg Oral Q12H    metoprolol succinate  50 mg Oral Daily    polyethylene glycol  17 g Oral Daily    pregabalin  225 mg Oral QHS    senna-docusate 8.6-50 mg  1 tablet Oral Daily     PRN Meds:bisacodyl, ondansetron, oxyCODONE, oxyCODONE, promethazine (PHENERGAN) IVPB, sodium chloride 0.9%, sodium chloride 0.9%, zolpidem     Objective:     Vital Signs (Most Recent):  Temp: 97.8 °F (36.6 °C) (04/25/19 0437)  Pulse: 104 (04/25/19 0437)  Resp: 17 (04/25/19 0437)  BP: 130/78 (04/25/19 0437)  SpO2: 97 % (04/25/19 0437) Vital Signs (24h Range):  Temp:  [97.4 °F (36.3 °C)-98.8 °F (37.1 °C)] 97.8 °F (36.6 °C)  Pulse:  [] 104  Resp:  [17-20] 17  SpO2:  [95 %-100 %] 97 %  BP: (114-130)/(73-81) 130/78         Physical Exam   Constitutional: He is oriented to person, place, and time. He appears well-developed and well-nourished.   Neck: No JVD present. No tracheal deviation present.   Cardiovascular: Normal rate and regular rhythm.   Pulmonary/Chest: Effort normal. No respiratory distress.   Abdominal: Soft. He exhibits no distension. There is no tenderness. There is no guarding.   Musculoskeletal: He exhibits no edema.   L AKA with wound vac, minimal edema, pink and healthy appearing tissue   R AKA incision c/d/i    Neurological: He is alert and oriented to person, place, and time.   Skin: Skin is warm and dry.   Nursing note and vitals reviewed.      Significant Labs:  CBC:   Recent Labs   Lab 04/25/19 0434   WBC 9.33   RBC 3.74*   HGB 10.0*   HCT 29.4*   *   MCV 79*   MCH 26.7*   MCHC 34.0     CMP:   Recent Labs   Lab 04/25/19 0434      CALCIUM 9.8   ALBUMIN 3.0*   PROT 7.3      K 4.2   CO2 26      BUN 19   CREATININE 0.7   ALKPHOS 150*   ALT 30   AST 52*   BILITOT 0.2       Significant Diagnostics:  I have reviewed all pertinent imaging results/findings within the past  24 hours.

## 2019-04-25 NOTE — ASSESSMENT & PLAN NOTE
59 y.o. male with h/o HTN, arthritis, and cerebral palsy, tobacco abuse, PVD with previous aorto-bifem and L fem/pop. S/p emergent L AKA 3/20/19. Presents with fat necrosis L AKA stump and ischemic R foot rest pain. 9 Days Post-Op from L AKA washout/debridement with wound vac placement and Right AKA on 4/11/19. Left AKA with tracking muscle necrosis and purulence.    - Regular diet  - PO cipro to complete total 14 days of ABx (end date 4/27/19)  - PRN pain control   - Wound vac changed 4/22/19 - continue changes q 3 days   - HIV & RPR negative     Dispo: PM&R saw patient yesterday, appreciate recs   Will change wound vac at lunchtime  Then patient will be Rehab bound.

## 2019-04-25 NOTE — PROGRESS NOTES
[facility]  Vascular Surgery  Progress Note    Patient Name: Pranay Colindres  MRN: [id number]  Admission Date: 4/10/2019  Primary Care Provider: Jenaro Torres MD    Subjective:       Post-Op Info:  Procedure(s) (LRB):  AMPUTATION, ABOVE KNEE left hip disarticulation (Left)  REPLACEMENT, WOUND VAC   9 Days Post-Op     Interval History:  No issues overnight      Medications:  Continuous Infusions:  Scheduled Meds:   ciprofloxacin HCl  500 mg Oral Q12H    metoprolol succinate  50 mg Oral Daily    polyethylene glycol  17 g Oral Daily    pregabalin  225 mg Oral QHS    senna-docusate 8.6-50 mg  1 tablet Oral Daily     PRN Meds:bisacodyl, ondansetron, oxyCODONE, oxyCODONE, promethazine (PHENERGAN) IVPB, sodium chloride 0.9%, sodium chloride 0.9%, zolpidem     Objective:     Vital Signs (Most Recent):  Temp: 97.8 °F (36.6 °C) (04/25/19 0437)  Pulse: 104 (04/25/19 0437)  Resp: 17 (04/25/19 0437)  BP: 130/78 (04/25/19 0437)  SpO2: 97 % (04/25/19 0437) Vital Signs (24h Range):  Temp:  [97.4 °F (36.3 °C)-98.8 °F (37.1 °C)] 97.8 °F (36.6 °C)  Pulse:  [] 104  Resp:  [17-20] 17  SpO2:  [95 %-100 %] 97 %  BP: (114-130)/(73-81) 130/78         Physical Exam   Constitutional: He is oriented to person, place, and time. He appears well-developed and well-nourished.   Neck: No JVD present. No tracheal deviation present.   Cardiovascular: Normal rate and regular rhythm.   Pulmonary/Chest: Effort normal. No respiratory distress.   Abdominal: Soft. He exhibits no distension. There is no tenderness. There is no guarding.   Musculoskeletal: He exhibits no edema.   L AKA with wound vac, minimal edema, pink and healthy appearing tissue   R AKA incision c/d/i    Neurological: He is alert and oriented to person, place, and time.   Skin: Skin is warm and dry.   Nursing note and vitals reviewed.      Significant Labs:  CBC:   Recent Labs   Lab 04/25/19  0434   WBC 9.33   RBC 3.74*   HGB 10.0*   HCT 29.4*   *    MCV 79*   MCH 26.7*   MCHC 34.0     CMP:   Recent Labs   Lab 04/25/19  0434      CALCIUM 9.8   ALBUMIN 3.0*   PROT 7.3      K 4.2   CO2 26      BUN 19   CREATININE 0.7   ALKPHOS 150*   ALT 30   AST 52*   BILITOT 0.2       Significant Diagnostics:  I have reviewed all pertinent imaging results/findings within the past 24 hours.    Assessment/Plan:     * Critical lower limb ischemia  59 y.o. male with h/o HTN, arthritis, and cerebral palsy, tobacco abuse, PVD with previous aorto-bifem and L fem/pop. S/p emergent L AKA 3/20/19. Presents with fat necrosis L AKA stump and ischemic R foot rest pain. 9 Days Post-Op from L AKA washout/debridement with wound vac placement and Right AKA on 4/11/19. Left AKA with tracking muscle necrosis and purulence.    - Regular diet  - PO cipro to complete total 14 days of ABx (end date 4/27/19)  - PRN pain control   - Wound vac changed 4/22/19 - continue changes q 3 days   - HIV & RPR negative     Dispo: PM&R saw patient yesterday, appreciate recs   Will change wound vac at lunchtime  Then patient will be Rehab bound.         Zakia Lovett MD  Vascular Surgery  [facility]

## 2019-04-26 NOTE — DISCHARGE SUMMARY
[facility]  Vascular Surgery  Discharge Summary      Patient Name: Pranay Colindres  MRN: [id number]  Admission Date: 4/10/2019  Hospital Length of Stay: 15 days  Discharge Date and Time: 4/25/2019  5:47 PM  Attending Physician: No att. providers found   Discharging Provider: Zakia Lovett MD  Primary Care Provider: Jenaro Torres MD     HPI: Patient is a 59 y.o. male with a h/o HTN, arthritis, and cerebral palsy, known PAD s/p previous aorto-bifem and L fem/pop tobacco abuse who presented to the ED 3/20/19 as a transfer from [facility] with a complaint of pulseless left foot; underwent emergent L AKA.  By history, it likely occluded in early March 2019 (he was ambulating in early March); in the ER, he had a retracted L foot with no motor/sensory.  I noted intra-op only one lateral PFA branch that was patent; no cautery was used.  Now comes with some fat necrosis L AKA stump and ischemic R  Foot rest pain.     Prior history of Aorto-bifem for what sounds like aorto-iliac occlusive disease and prior left Fem-AK pop bypass by Dr. Jose Lan in [place], [place]. Prior to admission, he had progressive left leg pain for past 2-3 weeks and was found in the ER to be unable to wiggle toes at baseline.      Tobacco use: Current smoker though states quit in mid-March 2019      Procedure(s) (LRB):  AMPUTATION, ABOVE KNEE left hip disarticulation (Left)  REPLACEMENT, WOUND VAC     Hospital Course: Hospital Course:  Please see the preoperative H&P and other available documentation for full details related to history prior to this admission.  Briefly, Pranay Colindres is a 59 y.o. male who was admitted following scheduled surgery for Critical lower limb ischemia    Following a complete preoperative discussion of the risks and benefits of surgery with signed informed consent, the patient was taken to the operating room on 4/11/2019 for Right AKA for ischemic rest pain and non-reconstructable vascular  disease. His infected Left AK stump required reamputation 4cm higher as well as debridement of necrotic tissue. It was left open with a wound vac. On 4/16/2019, he was taken back to the OR for left hip disarticulation.  The patient tolerated surgeries well.  Please see the operative report for full intraoperative findings and details.  He had a stable and uncomplicated hospital course.  Labs and vital signs remained stable and appropriate throughout course.  Diet was advanced as tolerated and the patient's pain was controlled on oral pain medications without problem.  Currently, the patient is doing well at 10 Days Post-Op Day Post-Op and is stable and appropriate for discharge to rehab at this time.        Consults:   Consults (From admission, onward)        Status Ordering Provider     Inpatient consult to Cardiology  Once     Provider:  (Not yet assigned)    Completed ANN BARBER     Inpatient consult to Infectious Diseases  Once     Provider:  (Not yet assigned)    Completed ZIGGY HIDALGO     Inpatient consult to Physical Medicine Rehab  Once     Provider:  (Not yet assigned)    Completed ARMAND ADAMES          Significant Diagnostic Studies:     Pending Diagnostic Studies:     None        Final Active Diagnoses:    Diagnosis Date Noted POA    PRINCIPAL PROBLEM:  Critical lower limb ischemia [I99.8] 03/20/2019 Yes    Sinus tachycardia [R00.0] 04/20/2019 Unknown    Impaired mobility and activities of daily living [Z74.09] 03/21/2019 Yes      Problems Resolved During this Admission:      Discharged Condition: good    Disposition: Rehab Facility    Follow Up:  Follow-up Information     Jenaro Torres MD.    Specialty:  Family Medicine  Why:  Outpatient Services  Contact information:  [address]  [facility] [address]  [phone]             Thalia Moreno MD In 1 week.    Specialties:  Vascular Surgery, General Surgery  Why:  post op, staple removal   Contact information:  [address]  [facility] [address]  [phone]                   Patient Instructions:      Notify your health care provider if you experience any of the following:  temperature >100.4     Notify your health care provider if you experience any of the following:  persistent nausea and vomiting or diarrhea     Notify your health care provider if you experience any of the following:  severe uncontrolled pain     Notify your health care provider if you experience any of the following:  redness, tenderness, or signs of infection (pain, swelling, redness, odor or green/yellow discharge around incision site)     Medications:  Reconciled Home Medications:      Medication List      START taking these medications    oxyCODONE-acetaminophen  mg per tablet  Commonly known as:  PERCOCET  Take 1 tablet by mouth every 4 (four) hours as needed for Pain (Take 30 min prior to wound vac).        CONTINUE taking these medications    amLODIPine 10 MG tablet  Commonly known as:  NORVASC  Take 1 tablet (10 mg total) by mouth once daily.     apixaban 2.5 mg Tab  Commonly known as:  ELIQUIS  Take 1 tablet (2.5 mg total) by mouth 2 (two) times daily.     aspirin 81 MG EC tablet  Commonly known as:  ECOTRIN  Take 1 tablet (81 mg total) by mouth once daily.     atorvastatin 40 MG tablet  Commonly known as:  LIPITOR  Take 1 tablet (40 mg total) by mouth once daily.     gabapentin 300 MG capsule  Commonly known as:  NEURONTIN  Take 1 capsule (300 mg total) by mouth every evening.     HYDROcodone-acetaminophen 7.5-325 mg per tablet  Commonly known as:  NORCO  Take 1 tablet by mouth every 4 (four) hours as needed for Pain.     latanoprost 0.005 % ophthalmic solution     metoprolol succinate 50 MG 24 hr tablet  Commonly known as:  TOPROL-XL  Take 50 mg by mouth once daily.     pantoprazole 40 MG tablet  Commonly known as:  PROTONIX     senna 8.6 mg tablet  Commonly known as:  SENOKOT  Take 1 tablet by mouth once daily.     traMADol 50 mg  tablet  Commonly known as:  ULTRAM  Take 1 tablet (50 mg total) by mouth every 6 (six) hours as needed for Pain.            Zakia Lovett MD  Vascular Surgery  [facility]

## 2019-04-29 NOTE — PROGRESS NOTES
Physical Therapy Discharge Summary    Name: Pranay Colindres  MRN: [id number]   Principal Problem: Critical lower limb ischemia     Patient Discharged from acute Physical Therapy on 19.  Please refer to prior PT noted date on 19 for functional status.     Assessment:     Patient appropriate for care in another setting.    Objective:     GOALS:   Multidisciplinary Problems     Physical Therapy Goals     Not on file          Multidisciplinary Problems (Resolved)        Problem: Physical Therapy Goal    Goal Priority Disciplines Outcome Goal Variances Interventions   Physical Therapy Goal   (Resolved)     PT, PT/OT Outcome(s) achieved     Description:  Goals to be met by: 19     Patient will increase functional independence with mobility by performin. Supine to sit with Contact Guard Assistance - Met  Revised goal: supine to sit with supervision-not met  2. Sit to supine with Contact Guard Assistance - Met 2019  Sit to supine with supervision-not met  3. Bed to chair transfer with Minimal assistance via slideboard into wheelchair - Not met  4. Wheelchair propulsion x 500 feet with Carter using bilateral upper extremities - Not met  5. Sitting at edge of bed x 10 minutes with Contact Guard Assistance without (B) UE usage - Not met  6. Lower extremity exercise program x 10 reps per handout, with supervision - Not met                        Goals not met prior to D/C  Reasons for Discontinuation of Therapy Services  Transfer to alternate level of care.      Plan:     Patient Discharged to: Inpatient Rehab.    Tanisha Bravo, PT  2019

## 2019-05-06 ENCOUNTER — OFFICE VISIT (OUTPATIENT)
Dept: VASCULAR SURGERY | Facility: CLINIC | Age: 59
End: 2019-05-06
Payer: MEDICARE

## 2019-05-06 VITALS
WEIGHT: 113 LBS | SYSTOLIC BLOOD PRESSURE: 130 MMHG | BODY MASS INDEX: 17.18 KG/M2 | HEART RATE: 95 BPM | DIASTOLIC BLOOD PRESSURE: 79 MMHG | TEMPERATURE: 99 F

## 2019-05-06 DIAGNOSIS — I70.229 CRITICAL LOWER LIMB ISCHEMIA: Primary | ICD-10-CM

## 2019-05-06 DIAGNOSIS — G80.8 OTHER CEREBRAL PALSY: ICD-10-CM

## 2019-05-06 PROCEDURE — 99999 PR PBB SHADOW E&M-EST. PATIENT-LVL III: ICD-10-PCS | Mod: PBBFAC,,, | Performed by: SURGERY

## 2019-05-06 PROCEDURE — 99024 PR POST-OP FOLLOW-UP VISIT: ICD-10-PCS | Mod: S$GLB,,, | Performed by: SURGERY

## 2019-05-06 PROCEDURE — 99024 POSTOP FOLLOW-UP VISIT: CPT | Mod: S$GLB,,, | Performed by: SURGERY

## 2019-05-06 PROCEDURE — 99999 PR PBB SHADOW E&M-EST. PATIENT-LVL III: CPT | Mod: PBBFAC,,, | Performed by: SURGERY

## 2019-05-06 NOTE — PROGRESS NOTES
Post op visit for right AKA on Apr 11 and revision of left AK.    Right side healing well - staples out today  Left side granulating nicely. No ischemia or infection.    Continue daily wound care to left AK stump  Follow up with us as needed.    Thalia Moreno MD FACS [facility]  Vascular & Endovascular Surgery

## 2019-05-14 LAB
FUNGUS SPEC CULT: NORMAL
FUNGUS SPEC CULT: NORMAL

## 2019-05-16 DIAGNOSIS — I70.229 CRITICAL LOWER LIMB ISCHEMIA: ICD-10-CM

## 2019-05-16 RX ORDER — HYDROCODONE BITARTRATE AND ACETAMINOPHEN 7.5; 325 MG/1; MG/1
1 TABLET ORAL EVERY 6 HOURS PRN
Qty: 45 TABLET | Refills: 0 | Status: SHIPPED | OUTPATIENT
Start: 2019-05-16

## 2019-06-13 LAB
ACID FAST MOD KINY STN SPEC: NORMAL
ACID FAST MOD KINY STN SPEC: NORMAL
MYCOBACTERIUM SPEC QL CULT: NORMAL
MYCOBACTERIUM SPEC QL CULT: NORMAL

## 2019-07-30 ENCOUNTER — LAB VISIT (OUTPATIENT)
Dept: LAB | Facility: HOSPITAL | Age: 59
End: 2019-07-30
Attending: FAMILY MEDICINE
Payer: MEDICARE

## 2019-07-30 DIAGNOSIS — D51.0 PERNICIOUS ANEMIA: ICD-10-CM

## 2019-07-30 DIAGNOSIS — G54.8 PHANTOM PAIN: ICD-10-CM

## 2019-07-30 DIAGNOSIS — D51.9 VITAMIN B12 DEFICIENCY ANEMIA: ICD-10-CM

## 2019-07-30 DIAGNOSIS — G80.9 CEREBRAL PALSY: ICD-10-CM

## 2019-07-30 DIAGNOSIS — R52 PHANTOM PAIN: ICD-10-CM

## 2019-07-30 DIAGNOSIS — D64.9 ANEMIA: Primary | ICD-10-CM

## 2019-07-30 LAB
ALBUMIN SERPL BCP-MCNC: 4.3 G/DL (ref 3.5–5.2)
ALP SERPL-CCNC: 111 U/L (ref 55–135)
ALT SERPL W/O P-5'-P-CCNC: 16 U/L (ref 10–44)
ANION GAP SERPL CALC-SCNC: 11 MMOL/L (ref 8–16)
AST SERPL-CCNC: 28 U/L (ref 10–40)
BILIRUB SERPL-MCNC: 0.7 MG/DL (ref 0.1–1)
BUN SERPL-MCNC: 18 MG/DL (ref 6–20)
CALCIUM SERPL-MCNC: 9.4 MG/DL (ref 8.7–10.5)
CHLORIDE SERPL-SCNC: 104 MMOL/L (ref 95–110)
CO2 SERPL-SCNC: 25 MMOL/L (ref 23–29)
CREAT SERPL-MCNC: 0.7 MG/DL (ref 0.5–1.4)
ERYTHROCYTE [DISTWIDTH] IN BLOOD BY AUTOMATED COUNT: 16.4 % (ref 11.5–14.5)
EST. GFR  (AFRICAN AMERICAN): >60 ML/MIN/1.73 M^2
EST. GFR  (NON AFRICAN AMERICAN): >60 ML/MIN/1.73 M^2
FOLATE SERPL-MCNC: 6.4 NG/ML (ref 4–24)
GLUCOSE SERPL-MCNC: 107 MG/DL (ref 70–110)
HCT VFR BLD AUTO: 39.2 % (ref 40–54)
HGB BLD-MCNC: 13.9 G/DL (ref 14–18)
IRON SERPL-MCNC: 46 UG/DL (ref 45–160)
MAGNESIUM SERPL-MCNC: 1.8 MG/DL (ref 1.6–2.6)
MCH RBC QN AUTO: 25 PG (ref 27–31)
MCHC RBC AUTO-ENTMCNC: 35.5 G/DL (ref 32–36)
MCV RBC AUTO: 70 FL (ref 82–98)
PLATELET # BLD AUTO: 320 K/UL (ref 150–350)
PMV BLD AUTO: 10.4 FL (ref 9.2–12.9)
POTASSIUM SERPL-SCNC: 4 MMOL/L (ref 3.5–5.1)
PROT SERPL-MCNC: 7.8 G/DL (ref 6–8.4)
RBC # BLD AUTO: 5.57 M/UL (ref 4.6–6.2)
SODIUM SERPL-SCNC: 140 MMOL/L (ref 136–145)
TSH SERPL DL<=0.005 MIU/L-ACNC: 1.15 UIU/ML (ref 0.34–5.6)
VIT B12 SERPL-MCNC: 331 PG/ML (ref 210–950)
WBC # BLD AUTO: 9.44 K/UL (ref 3.9–12.7)

## 2019-07-30 PROCEDURE — 85027 COMPLETE CBC AUTOMATED: CPT

## 2019-07-30 PROCEDURE — 83540 ASSAY OF IRON: CPT

## 2019-07-30 PROCEDURE — 84443 ASSAY THYROID STIM HORMONE: CPT

## 2019-07-30 PROCEDURE — 83735 ASSAY OF MAGNESIUM: CPT

## 2019-07-30 PROCEDURE — 82607 VITAMIN B-12: CPT

## 2019-07-30 PROCEDURE — 80053 COMPREHEN METABOLIC PANEL: CPT

## 2019-07-30 PROCEDURE — 82746 ASSAY OF FOLIC ACID SERUM: CPT

## 2019-07-30 PROCEDURE — 36415 COLL VENOUS BLD VENIPUNCTURE: CPT

## 2019-08-07 DIAGNOSIS — Z89.612 HX OF AKA (ABOVE KNEE AMPUTATION), LEFT: Primary | ICD-10-CM

## 2019-10-14 ENCOUNTER — TELEPHONE (OUTPATIENT)
Dept: VASCULAR SURGERY | Facility: CLINIC | Age: 59
End: 2019-10-14

## 2019-10-14 NOTE — TELEPHONE ENCOUNTER
----- Message from Yang Sales sent at 10/14/2019 11:02 AM CDT -----  Pt needs nurse to give him a call at [phone] for questions regarding physical therapy/ prosthetic training.

## 2019-10-14 NOTE — TELEPHONE ENCOUNTER
"Per Dr June notified  Jens that he will write orders for physical therapy and prosthesis training."  Jens provider the phone number for Chidi Ugalde to contact.  "

## 2019-10-15 DIAGNOSIS — Z89.611 STATUS POST BILATERAL ABOVE KNEE AMPUTATION: Primary | ICD-10-CM

## 2019-10-15 DIAGNOSIS — Z89.612 STATUS POST BILATERAL ABOVE KNEE AMPUTATION: Primary | ICD-10-CM

## 2019-10-17 ENCOUNTER — TELEPHONE (OUTPATIENT)
Dept: VASCULAR SURGERY | Facility: CLINIC | Age: 59
End: 2019-10-17

## 2019-10-17 NOTE — TELEPHONE ENCOUNTER
----- Message from Pola Pollock sent at 10/17/2019  1:26 PM CDT -----  Contact: Pt's niece Marion Colindres  Type:  Needs Medical Advice    Who Called: The caller states that the Pt was told by the [facility] that he needs to be admitted not out patient.  The caller states that an order can be faxed for the Pt to - Fax#[phone], Send to  [facility].     Best Call Back Number: Cell [phone] or Home [phone]

## 2019-10-17 NOTE — TELEPHONE ENCOUNTER
Returned call spoke with Chidi Ugalde,Referral to Physical Therapy faxed to [phone] as requested.

## 2019-11-21 ENCOUNTER — TELEPHONE (OUTPATIENT)
Dept: VASCULAR SURGERY | Facility: CLINIC | Age: 59
End: 2019-11-21

## 2019-11-21 NOTE — TELEPHONE ENCOUNTER
"Spoke with pt's brother who stated "my brother was d/c from the hospital(rehab facility) in [place] on Sunday.He saw Dr Tim and Dr. Gallegos there.I don't like the home health people they sent out yesterday([facility]) I want him to go back to [facility] who's familiar with him"the patient's brother informed that he would have to contact the md and facility to address his concerns.this is not something we can handle.Stated"ok thank you l'll get in touch with them.   ----- Message from Pola Pollock sent at 11/21/2019  8:53 AM CST -----  Contact: Pt's brother Arya      The caller states that he needs more rehab, but they want to stay with the people they had before who are familiar with the Pt.  The caller states that they would like to have an order written that the Pt can go back to the original facility.    Phone # [phone]    "

## 2021-01-25 ENCOUNTER — LAB VISIT (OUTPATIENT)
Dept: LAB | Facility: HOSPITAL | Age: 61
End: 2021-01-25
Attending: FAMILY MEDICINE
Payer: MEDICARE

## 2021-01-25 DIAGNOSIS — D50.9 IRON DEFICIENCY ANEMIA, UNSPECIFIED: ICD-10-CM

## 2021-01-25 DIAGNOSIS — E55.9 AVITAMINOSIS D: ICD-10-CM

## 2021-01-25 DIAGNOSIS — R53.83 FATIGUE: ICD-10-CM

## 2021-01-25 DIAGNOSIS — I10 ESSENTIAL HYPERTENSION, MALIGNANT: Primary | ICD-10-CM

## 2021-01-25 DIAGNOSIS — D52.9 FOLATE-DEFICIENCY ANEMIA: ICD-10-CM

## 2021-01-25 LAB
ALBUMIN SERPL BCP-MCNC: 4.3 G/DL (ref 3.5–5.2)
ALP SERPL-CCNC: 138 U/L (ref 55–135)
ALT SERPL W/O P-5'-P-CCNC: 24 U/L (ref 10–44)
ANION GAP SERPL CALC-SCNC: 10 MMOL/L (ref 8–16)
AST SERPL-CCNC: 40 U/L (ref 10–40)
BASOPHILS # BLD AUTO: 0.05 K/UL (ref 0–0.2)
BASOPHILS NFR BLD: 0.4 % (ref 0–1.9)
BILIRUB SERPL-MCNC: 0.5 MG/DL (ref 0.1–1)
BUN SERPL-MCNC: 15 MG/DL (ref 8–23)
CALCIUM SERPL-MCNC: 9.4 MG/DL (ref 8.7–10.5)
CHLORIDE SERPL-SCNC: 106 MMOL/L (ref 95–110)
CO2 SERPL-SCNC: 22 MMOL/L (ref 23–29)
CREAT SERPL-MCNC: 0.7 MG/DL (ref 0.5–1.4)
DIFFERENTIAL METHOD: ABNORMAL
EOSINOPHIL # BLD AUTO: 0.3 K/UL (ref 0–0.5)
EOSINOPHIL NFR BLD: 2.4 % (ref 0–8)
ERYTHROCYTE [DISTWIDTH] IN BLOOD BY AUTOMATED COUNT: 15.1 % (ref 11.5–14.5)
EST. GFR  (AFRICAN AMERICAN): >60 ML/MIN/1.73 M^2
EST. GFR  (NON AFRICAN AMERICAN): >60 ML/MIN/1.73 M^2
GLUCOSE SERPL-MCNC: 72 MG/DL (ref 70–110)
HCT VFR BLD AUTO: 41.6 % (ref 40–54)
HGB BLD-MCNC: 14.9 G/DL (ref 14–18)
IMM GRANULOCYTES # BLD AUTO: 0.05 K/UL (ref 0–0.04)
IMM GRANULOCYTES NFR BLD AUTO: 0.4 % (ref 0–0.5)
IRON SERPL-MCNC: 81 UG/DL (ref 45–160)
LYMPHOCYTES # BLD AUTO: 2.5 K/UL (ref 1–4.8)
LYMPHOCYTES NFR BLD: 18.4 % (ref 18–48)
MCH RBC QN AUTO: 28.3 PG (ref 27–31)
MCHC RBC AUTO-ENTMCNC: 35.8 G/DL (ref 32–36)
MCV RBC AUTO: 79 FL (ref 82–98)
MONOCYTES # BLD AUTO: 0.9 K/UL (ref 0.3–1)
MONOCYTES NFR BLD: 6.8 % (ref 4–15)
NEUTROPHILS # BLD AUTO: 9.7 K/UL (ref 1.8–7.7)
NEUTROPHILS NFR BLD: 71.6 % (ref 38–73)
NRBC BLD-RTO: 0 /100 WBC
PLATELET # BLD AUTO: 314 K/UL (ref 150–350)
PMV BLD AUTO: 11.2 FL (ref 9.2–12.9)
POTASSIUM SERPL-SCNC: 4.2 MMOL/L (ref 3.5–5.1)
PROT SERPL-MCNC: 8.2 G/DL (ref 6–8.4)
RBC # BLD AUTO: 5.26 M/UL (ref 4.6–6.2)
SODIUM SERPL-SCNC: 138 MMOL/L (ref 136–145)
TSH SERPL DL<=0.005 MIU/L-ACNC: 1.09 UIU/ML (ref 0.34–5.6)
WBC # BLD AUTO: 13.46 K/UL (ref 3.9–12.7)

## 2021-01-25 PROCEDURE — 84443 ASSAY THYROID STIM HORMONE: CPT

## 2021-01-25 PROCEDURE — 36415 COLL VENOUS BLD VENIPUNCTURE: CPT

## 2021-01-25 PROCEDURE — 85025 COMPLETE CBC W/AUTO DIFF WBC: CPT

## 2021-01-25 PROCEDURE — 83540 ASSAY OF IRON: CPT

## 2021-01-25 PROCEDURE — 82607 VITAMIN B-12: CPT

## 2021-01-25 PROCEDURE — 80053 COMPREHEN METABOLIC PANEL: CPT

## 2021-01-25 PROCEDURE — 82306 VITAMIN D 25 HYDROXY: CPT

## 2021-01-25 PROCEDURE — 82746 ASSAY OF FOLIC ACID SERUM: CPT

## 2021-01-26 LAB
FOLATE SERPL-MCNC: 14.6 NG/ML (ref 4–24)
VIT B12 SERPL-MCNC: 630 PG/ML (ref 210–950)

## 2021-01-27 LAB — 25(OH)D3+25(OH)D2 SERPL-MCNC: 15 NG/ML (ref 30–96)

## 2022-04-13 NOTE — ANESTHESIA POSTPROCEDURE EVALUATION
Anesthesia Post Evaluation    Patient: Pranay Colindres    Procedure(s) Performed: Procedure(s) (LRB):  AMPUTATION, ABOVE KNEE (Right)  DEBRIDEMENT, WOUND (Left)  APPLICATION, WOUND VAC (Left)    Final Anesthesia Type: general  Patient location during evaluation: PACU  Patient participation: Yes- Able to Participate  Level of consciousness: awake and alert  Pain management: adequate  Airway patency: patent  PONV status at discharge: No PONV  Anesthetic complications: no      Cardiovascular status: blood pressure returned to baseline  Respiratory status: unassisted  Hydration status: euvolemic  Follow-up not needed.          Vitals Value Taken Time   /62 4/11/2019  2:16 PM   Temp 36.4 °C (97.6 °F) 4/11/2019  2:16 PM   Pulse 77 4/11/2019  2:22 PM   Resp 10 4/11/2019  2:22 PM   SpO2 100 % 4/11/2019  2:22 PM   Vitals shown include unvalidated device data.      No case tracking events are documented in the log.      Pain/Yue Score: Pain Rating Prior to Med Admin: 7 (4/11/2019  2:23 PM)  Pain Rating Post Med Admin: 4 (4/10/2019 10:15 PM)  Yue Score: 10 (4/11/2019 11:24 AM)         Xolair Pregnancy And Lactation Text: This medication is Pregnancy Category B and is considered safe during pregnancy. This medication is excreted in breast milk.

## 2025-01-08 ENCOUNTER — HOSPITAL ENCOUNTER (EMERGENCY)
Facility: HOSPITAL | Age: 65
Discharge: HOME | End: 2025-01-08
Attending: EMERGENCY MEDICINE
Payer: MEDICARE

## 2025-01-08 VITALS
HEART RATE: 103 BPM | HEIGHT: 68 IN | TEMPERATURE: 98 F | WEIGHT: 113 LBS | RESPIRATION RATE: 20 BRPM | BODY MASS INDEX: 17.13 KG/M2 | DIASTOLIC BLOOD PRESSURE: 91 MMHG | SYSTOLIC BLOOD PRESSURE: 129 MMHG | OXYGEN SATURATION: 98 %

## 2025-01-08 DIAGNOSIS — M79.602 PAIN IN BOTH UPPER EXTREMITIES: Primary | ICD-10-CM

## 2025-01-08 DIAGNOSIS — M79.601 PAIN IN BOTH UPPER EXTREMITIES: Primary | ICD-10-CM
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
## 2025-01-08 PROCEDURE — 73060 X-RAY EXAM OF HUMERUS: CPT | Mod: 26,LT,, | Performed by: RADIOLOGY

## 2025-01-08 PROCEDURE — 73090 X-RAY EXAM OF FOREARM: CPT | Mod: TC,RT

## 2025-01-08 PROCEDURE — 73060 X-RAY EXAM OF HUMERUS: CPT | Mod: TC,LT

## 2025-01-08 PROCEDURE — 73060 X-RAY EXAM OF HUMERUS: CPT | Mod: TC,RT

## 2025-01-08 PROCEDURE — 73060 X-RAY EXAM OF HUMERUS: CPT | Mod: 26,RT,, | Performed by: RADIOLOGY

## 2025-01-08 PROCEDURE — 25000003 PHARM REV CODE 250: Performed by: NURSE PRACTITIONER

## 2025-01-08 PROCEDURE — 99284 EMERGENCY DEPT VISIT MOD MDM: CPT | Mod: 25

## 2025-01-08 PROCEDURE — 73090 X-RAY EXAM OF FOREARM: CPT | Mod: 26,LT,, | Performed by: RADIOLOGY

## 2025-01-08 PROCEDURE — 73090 X-RAY EXAM OF FOREARM: CPT | Mod: 26,RT,, | Performed by: RADIOLOGY

## 2025-01-08 PROCEDURE — 73090 X-RAY EXAM OF FOREARM: CPT | Mod: TC,LT

## 2025-01-08 RX ORDER — NAPROXEN 500 MG/1
500 TABLET ORAL 2 TIMES DAILY WITH MEALS
Qty: 20 TABLET | Refills: 0 | Status: SHIPPED | OUTPATIENT
Start: 2025-01-08

## 2025-01-08 RX ORDER — NAPROXEN 250 MG/1
500 TABLET ORAL
Status: COMPLETED | OUTPATIENT
Start: 2025-01-08 | End: 2025-01-08

## 2025-01-08 RX ORDER — ROSUVASTATIN CALCIUM 20 MG/1
20 TABLET, COATED ORAL NIGHTLY
COMMUNITY

## 2025-01-08 RX ORDER — LOSARTAN POTASSIUM 50 MG/1
50 TABLET ORAL
COMMUNITY
Start: 2024-12-13

## 2025-01-08 RX ORDER — ACETAMINOPHEN 500 MG
1000 TABLET ORAL
Status: COMPLETED | OUTPATIENT
Start: 2025-01-08 | End: 2025-01-08

## 2025-01-08 RX ORDER — BACLOFEN 10 MG/1
10 TABLET ORAL 3 TIMES DAILY
COMMUNITY
Start: 2024-12-13

## 2025-01-08 RX ADMIN — NAPROXEN 500 MG: 250 TABLET ORAL at 12:01

## 2025-01-08 RX ADMIN — ACETAMINOPHEN 1000 MG: 500 TABLET, FILM COATED ORAL at 12:01

## 2025-01-08 NOTE — ED PROVIDER NOTES
CHIEF COMPLAINT  Chief Complaint   Patient presents with    Fall     Pt arrives via [facility] c/o bilateral arm pain after falling forward out of motorized scooter yesterday.       HISTORY OF PRESENT ILLNESS  Pranay Colindres is a 64 y.o. male with PMH bilateral AKA (2019) who presents to ER for evaluation of bilateral arm pain after he fell forward out of motorized scooter. He states the scooter suddenly stopped due to out of batteries, fell forward with the bilateral arms on the wooden floor at home last night.  He denies head injury, LOC. there is no step-off deformity or swelling on bilateral arms, he complained of movement pain on both upper arms and lower arms. NVI, bilateral radial pulses 2+, moves both upper extremities.  No other specific aggravating or relieving factors otherwise.      PAST MEDICAL HISTORY  Past Medical History:   Diagnosis Date    Allergy     Arthritis     Cerebral palsy     Hypertension     Neuropathy        CURRENT MEDICATIONS    No current facility-administered medications for this encounter.    Current Outpatient Medications:     baclofen (LIORESAL) 10 MG tablet, Take 10 mg by mouth 3 (three) times daily., Disp: , Rfl:     losartan (COZAAR) 50 MG tablet, Take 50 mg by mouth., Disp: , Rfl:     amLODIPine (NORVASC) 10 MG tablet, Take 1 tablet (10 mg total) by mouth once daily., Disp: 30 tablet, Rfl: 3    apixaban (ELIQUIS) 2.5 mg Tab, Take 1 tablet (2.5 mg total) by mouth 2 (two) times daily., Disp: 60 tablet, Rfl: 3    aspirin (ECOTRIN) 81 MG EC tablet, Take 1 tablet (81 mg total) by mouth once daily., Disp: 30 tablet, Rfl: 11    atorvastatin (LIPITOR) 40 MG tablet, Take 1 tablet (40 mg total) by mouth once daily., Disp: 90 tablet, Rfl: 3    gabapentin (NEURONTIN) 300 MG capsule, Take 1 capsule (300 mg total) by mouth every evening., Disp: 30 capsule, Rfl: 6    HYDROcodone-acetaminophen (NORCO) 7.5-325 mg per tablet, Take 1 tablet by mouth every 6 (six) hours as needed for Pain.,  Disp: 45 tablet, Rfl: 0    latanoprost 0.005 % ophthalmic solution, , Disp: , Rfl:     metoprolol succinate (TOPROL-XL) 50 MG 24 hr tablet, Take 50 mg by mouth once daily., Disp: , Rfl:     naproxen (NAPROSYN) 500 MG tablet, Take 1 tablet (500 mg total) by mouth 2 (two) times daily with meals., Disp: 20 tablet, Rfl: 0    oxyCODONE-acetaminophen (PERCOCET)  mg per tablet, Take 1 tablet by mouth every 4 (four) hours as needed for Pain (Take 30 min prior to wound vac)., Disp: 30 tablet, Rfl: 0    pantoprazole (PROTONIX) 40 MG tablet, , Disp: , Rfl:     rosuvastatin (CRESTOR) 20 MG tablet, Take 20 mg by mouth every evening., Disp: , Rfl:     senna (SENOKOT) 8.6 mg tablet, Take 1 tablet by mouth once daily., Disp: , Rfl:     ALLERGIES    Review of patient's allergies indicates:  No Known Allergies    SURGICAL HISTORY    Past Surgical History:   Procedure Laterality Date    ABDOMINAL SURGERY      ABOVE-KNEE AMPUTATION Left 3/20/2019    Procedure: AMPUTATION, ABOVE KNEE;  Surgeon: Eligio Olmos MD;  Location: [address];  Service: Peripheral Vascular;  Laterality: Left;    ABOVE-KNEE AMPUTATION Right 4/11/2019    Procedure: AMPUTATION, ABOVE KNEE;  Surgeon: Thalia Moreno MD;  Location: [address];  Service: Peripheral Vascular;  Laterality: Right;    ABOVE-KNEE AMPUTATION Left 4/16/2019    Procedure: AMPUTATION, ABOVE KNEE left hip disarticulation;  Surgeon: Thalia Moreno MD;  Location: [address];  Service: Peripheral Vascular;  Laterality: Left;    AMPUTATION      right foot 5th digit    APPLICATION OF WOUND VACUUM-ASSISTED CLOSURE DEVICE Left 4/11/2019    Procedure: APPLICATION, WOUND VAC;  Surgeon: Thalia Moreno MD;  Location: [address];  Service: Peripheral Vascular;  Laterality: Left;    COLONOSCOPY N/A 4/23/2018    Procedure: COLONOSCOPY;  Surgeon: Jeramy Castelan MD;  Location: [facility];  Service: Endoscopy;  Laterality: N/A;    COLONOSCOPY  04/23/2018     "ESOPHAGOGASTRODUODENOSCOPY  2018    HIP SURGERY Bilateral     x2    REPLACEMENT OF WOUND VACUUM-ASSISTED CLOSURE DEVICE  2019    Procedure: REPLACEMENT, WOUND VAC;  Surgeon: Thalia Moreno MD;  [facility];  Service: Peripheral Vascular;;    WOUND DEBRIDEMENT Left 2019    Procedure: DEBRIDEMENT, WOUND;  Surgeon: Thalia Moreno MD;  Location: [facility] OR 2ND [facility]LR;  Service: Peripheral Vascular;  Laterality: Left;  Left AKA       SOCIAL HISTORY    Social History     Socioeconomic History    Marital status: Single   Tobacco Use    Smoking status: Former     Current packs/day: 0.00     Types: Cigarettes     Quit date: 3/12/2019     Years since quittin.8    Smokeless tobacco: Never   Substance and Sexual Activity    Alcohol use: Yes     Comment: 1-2    Drug use: No       FAMILY HISTORY    No family history on file.    REVIEW OF SYSTEMS    Review of Systems   Musculoskeletal:  Positive for falls and joint pain.     All other systems reviewed and are negative    VITAL SIGNS:   BP (!) 129/91   Pulse 103   Temp 98 °F (36.7 °C)   Resp 20   Ht 5' 8" (1.727 m)   Wt 51.3 kg (113 lb)   SpO2 98%   BMI 17.18 kg/m²      Physical Exam  Constitutional:       Appearance: Normal appearance.   HENT:      Head: Normocephalic.   Cardiovascular:      Rate and Rhythm: Normal rate.   Pulmonary:      Effort: Pulmonary effort is normal. No respiratory distress.      Breath sounds: Normal breath sounds.   Abdominal:      Palpations: Abdomen is soft.   Musculoskeletal:      Comments: Bilateral AKA, movement pain on bilateral upper/lower arms   Skin:     General: Skin is warm.      Capillary Refill: Capillary refill takes less than 2 seconds.   Neurological:      General: No focal deficit present.      Mental Status: He is alert.      GCS: GCS eye subscore is 4. GCS verbal subscore is 5. GCS motor subscore is 6.   Psychiatric:         Attention and Perception: Attention normal.         Mood and Affect: Mood " normal.         Speech: Speech normal.       Vitals and nursing note reviewed.     LABS    Labs Reviewed   HEPATITIS C ANTIBODY   HIV 1 / 2 ANTIBODY         EKG          RADIOLOGY    X-Ray Forearm Right   Final Result      No acute radiographic findings of the right and left forearm.         Electronically signed by: Rachid Smith   Date:    01/08/2025   Time:    12:34      X-Ray Forearm Left   Final Result      No acute radiographic findings of the right and left forearm.         Electronically signed by: Rachid Smith   Date:    01/08/2025   Time:    12:34      X-Ray Humerus 2 View Right   Final Result      No acute radiographic findings of the humerus.         Electronically signed by: Rachid Smith   Date:    01/08/2025   Time:    12:33      X-Ray Humerus 2 View Left   Final Result      No acute radiographic findings of the humerus.         Electronically signed by: Rachid Smith   Date:    01/08/2025   Time:    12:33            PROCEDURES    Procedures    Medications   naproxen tablet 500 mg (500 mg Oral Given 1/8/25 1215)   acetaminophen tablet 1,000 mg (1,000 mg Oral Given 1/8/25 1200)                Medical Decision Making  Pranay Colindres is a 64 y.o. male with PMH bilateral AKA (2019) who presents to ER for evaluation of bilateral arm pain after he fell forward out of motorized scooter. He states the scooter suddenly stopped due to out of batteries, fell forward with the bilateral arms on the wooden floor at home last night.  He denies head injury, LOC. there is no step-off deformity or swelling on bilateral arms, he complained of movement pain on both upper arms and lower arms. NVI, bilateral radial pulses 2+, moves both upper extremities.  No other specific aggravating or relieving factors otherwise.    Ddx: Fracture, strain, sprain, tendonitis     There is no evidence of fracture  Will treat MSK pain with naproxen, acetaminophen   After taking into careful account the historical factors and  physical exam findings of the patient's presentation today, in conjunction with imaging studies, no acute emergent medical condition requiring admission has been identified.   Patient was discharged to home with supportive care         Problems Addressed:  Fall: acute illness or injury  Pain in both upper extremities: acute illness or injury    Amount and/or Complexity of Data Reviewed  Radiology: ordered. Decision-making details documented in ED Course.     Details: No fx    Risk  OTC drugs.  Prescription drug management.           Current Discharge Medication List          Current Discharge Medication List        START taking these medications    Details   naproxen (NAPROSYN) 500 MG tablet Take 1 tablet (500 mg total) by mouth 2 (two) times daily with meals.  Qty: 20 tablet, Refills: 0             I discussed with patient and/or family/caretaker that evaluation in the ED does not suggest any emergent or life threatening medical condition requiring immediate intervention beyond what was provided in the ED, and I believe the patient is safe for discharge.  Regardless, an unremarkable evaluation in the ED does not preclude the development or presence of a serious or life threatening condition. As such, patient was instructed to return immediately for any worsening or change in current symptoms  Patient agrees with plan of care.    DISPOSITION  Patient discharged to home in stable condition.        FINAL IMPRESSION    1. Pain in both upper extremities    2. Fall         Karen Arzate NP  01/08/25 [id number]

## 2025-03-06 ENCOUNTER — APPOINTMENT (OUTPATIENT)
Dept: LAB | Facility: HOSPITAL | Age: 65
End: 2025-03-06
Attending: FAMILY MEDICINE
Payer: MEDICARE

## 2025-03-06 DIAGNOSIS — N39.0 URINARY TRACT INFECTION, SITE NOT SPECIFIED: Primary | ICD-10-CM

## 2025-03-06 LAB
BILIRUB UR QL STRIP: NEGATIVE
CLARITY UR: CLEAR
COLOR UR: YELLOW
GLUCOSE UR QL STRIP: NEGATIVE
HGB UR QL STRIP: NEGATIVE
KETONES UR QL STRIP: NEGATIVE
LEUKOCYTE ESTERASE UR QL STRIP: NEGATIVE
NITRITE UR QL STRIP: NEGATIVE
PH UR STRIP: 7 [PH] (ref 5–8)
PROT UR QL STRIP: ABNORMAL
SP GR UR STRIP: 1.02 (ref 1–1.03)
URN SPEC COLLECT METH UR: ABNORMAL
UROBILINOGEN UR STRIP-ACNC: ABNORMAL EU/DL

## 2025-03-06 PROCEDURE — 81003 URINALYSIS AUTO W/O SCOPE: CPT | Performed by: FAMILY MEDICINE

## 2025-04-11 ENCOUNTER — HOSPITAL ENCOUNTER (OUTPATIENT)
Dept: RADIOLOGY | Facility: HOSPITAL | Age: 65
Discharge: HOME | End: 2025-04-11
Attending: FAMILY MEDICINE
Payer: MEDICARE

## 2025-04-11 DIAGNOSIS — M54.50 LOW BACK PAIN: ICD-10-CM

## 2025-04-11 DIAGNOSIS — M54.50 LOW BACK PAIN: Primary | ICD-10-CM

## 2025-04-11 PROCEDURE — 72110 X-RAY EXAM L-2 SPINE 4/>VWS: CPT | Mod: 26,,, | Performed by: RADIOLOGY

## 2025-04-11 PROCEDURE — 72110 X-RAY EXAM L-2 SPINE 4/>VWS: CPT | Mod: TC

## 2025-08-20 ENCOUNTER — TELEPHONE (OUTPATIENT)
Dept: FAMILY MEDICINE | Facility: CLINIC | Age: 65
End: 2025-08-20
Payer: MEDICARE

## 2025-08-21 ENCOUNTER — OFFICE VISIT (OUTPATIENT)
Dept: FAMILY MEDICINE | Facility: CLINIC | Age: 65
End: 2025-08-21
Payer: MEDICARE

## 2025-08-21 VITALS — DIASTOLIC BLOOD PRESSURE: 64 MMHG | OXYGEN SATURATION: 98 % | HEART RATE: 71 BPM | SYSTOLIC BLOOD PRESSURE: 102 MMHG

## 2025-08-21 DIAGNOSIS — I10 HTN (HYPERTENSION), BENIGN: ICD-10-CM

## 2025-08-21 DIAGNOSIS — Z74.09 IMPAIRED MOBILITY AND ACTIVITIES OF DAILY LIVING: Primary | ICD-10-CM

## 2025-08-21 DIAGNOSIS — G80.8 OTHER CEREBRAL PALSY: ICD-10-CM

## 2025-08-21 DIAGNOSIS — I48.91 ATRIAL FIBRILLATION, UNSPECIFIED TYPE: ICD-10-CM

## 2025-08-21 DIAGNOSIS — E78.2 MIXED HYPERLIPIDEMIA: ICD-10-CM

## 2025-08-21 DIAGNOSIS — Z78.9 IMPAIRED MOBILITY AND ACTIVITIES OF DAILY LIVING: Primary | ICD-10-CM

## 2025-08-21 PROBLEM — Z12.11 ENCOUNTER FOR SCREENING COLONOSCOPY: Status: RESOLVED | Noted: 2018-04-18 | Resolved: 2025-08-21

## 2025-08-21 PROBLEM — M79.671 FOOT PAIN, RIGHT: Status: RESOLVED | Noted: 2019-03-06 | Resolved: 2025-08-21

## 2025-08-21 PROBLEM — I70.229 CRITICAL LOWER LIMB ISCHEMIA: Status: RESOLVED | Noted: 2019-03-20 | Resolved: 2025-08-21

## 2025-08-21 PROCEDURE — 99999 PR PBB SHADOW E&M-EST. PATIENT-LVL III: CPT | Mod: PBBFAC,,, | Performed by: FAMILY MEDICINE

## 2025-08-21 RX ORDER — LAMOTRIGINE 25 MG/1
25 TABLET ORAL DAILY
COMMUNITY
Start: 2025-08-05

## 2025-08-21 RX ORDER — ROSUVASTATIN CALCIUM 40 MG/1
40 TABLET, COATED ORAL DAILY
COMMUNITY

## 2025-08-27 DIAGNOSIS — I10 HTN (HYPERTENSION), BENIGN: ICD-10-CM

## (undated) DEVICE — STAPLER SKIN PROXIMATE WIDE

## (undated) DEVICE — SEE MEDLINE ITEM 146298

## (undated) DEVICE — KIT EVACUATOR 3-SPRING 1/8 DRN

## (undated) DEVICE — APPLICATOR CHLORAPREP ORN 26ML

## (undated) DEVICE — SEE MEDLINE ITEM 152622

## (undated) DEVICE — DRESSING ABSRBNT ISLAND 3.6X8

## (undated) DEVICE — SUT 3-0 12-18IN SILK

## (undated) DEVICE — BLADE SURG CARBON STEEL #10

## (undated) DEVICE — SUT PDS II 2-0 CT1

## (undated) DEVICE — SUT VICRYL PLUS 3-0 SH 18IN

## (undated) DEVICE — BANDAGE ACE ELASTIC 6"

## (undated) DEVICE — STOCKINET 4INX48

## (undated) DEVICE — CANISTER INFOV.A.C WOUND 500ML

## (undated) DEVICE — DRESSING DERMACEA SPNG 10S

## (undated) DEVICE — SUT 0 18IN SILK BLK BRAIDE

## (undated) DEVICE — SEE MEDLINE ITEM 157131

## (undated) DEVICE — SEE MEDLINE ITEM 146271

## (undated) DEVICE — BANDAGE ELASTIC NET TUBUL 5.5

## (undated) DEVICE — ADHESIVE MASTISOL VIAL 48/BX

## (undated) DEVICE — DRAPE PLASTIC U 60X72

## (undated) DEVICE — GAUZE SPONGE 4X4 12PLY

## (undated) DEVICE — SUT PROLENE 3-0 SH DA 36 BL

## (undated) DEVICE — BLADE SAGITTAL 18 X 1.27 X 90M

## (undated) DEVICE — SPONGE DERMACEA GAUZE 4X4

## (undated) DEVICE — STOCKINET TUBULAR 1 PLY 6X60IN

## (undated) DEVICE — TRAY MINOR GEN SURG

## (undated) DEVICE — SEE MEDLINE ITEM 146345

## (undated) DEVICE — STAPLER SKIN REGULAR

## (undated) DEVICE — SUT SILK 2-0 SH 18IN BLACK

## (undated) DEVICE — SPONGE LAP 18X18 PREWASHED

## (undated) DEVICE — SUT PROLENE 2-0 SH 36IN BLU

## (undated) DEVICE — PACK DRAPE UNIVERSAL CONVERTOR

## (undated) DEVICE — ELECTRODE REM PLYHSV RETURN 9

## (undated) DEVICE — SUT ETHILON 2-0 BLK PS-2

## (undated) DEVICE — GAUZE FLUFF XXLG 36X36 2 PLY

## (undated) DEVICE — DRESSING INFOVAC MED RND BLK

## (undated) DEVICE — CANISTER SUCTION 3000CC

## (undated) DEVICE — DRESSING GRANUFOAM LARGE VAC

## (undated) DEVICE — DRESSING TELFA PAD N ADH 2X3

## (undated) DEVICE — KIT ENDO CARRY ON PROCEDURE

## (undated) DEVICE — SOL IRRI STRL WATER 1000ML

## (undated) DEVICE — DRESSING TRANS 4X4 TEGADERM

## (undated) DEVICE — COVER LIGHT HANDLE 80/CA

## (undated) DEVICE — SUT VICRYL CT-1 2-0 27IN

## (undated) DEVICE — SUT BONE WAX 2.5 GRMS 12/BX

## (undated) DEVICE — IMMOB KNEE UNIV TRI PANEL 19IN